# Patient Record
Sex: FEMALE | Race: WHITE | Employment: OTHER | URBAN - METROPOLITAN AREA
[De-identification: names, ages, dates, MRNs, and addresses within clinical notes are randomized per-mention and may not be internally consistent; named-entity substitution may affect disease eponyms.]

---

## 2017-04-05 ENCOUNTER — HOSPITAL ENCOUNTER (EMERGENCY)
Age: 62
Discharge: HOME | End: 2017-04-05
Attending: EMERGENCY MEDICINE
Payer: MEDICARE

## 2017-04-05 ENCOUNTER — APPOINTMENT (OUTPATIENT)
Dept: GENERAL RADIOLOGY | Age: 62
End: 2017-04-05
Payer: MEDICARE

## 2017-04-05 VITALS
HEIGHT: 67 IN | OXYGEN SATURATION: 98 % | TEMPERATURE: 97.7 F | WEIGHT: 210 LBS | HEART RATE: 70 BPM | BODY MASS INDEX: 32.96 KG/M2 | DIASTOLIC BLOOD PRESSURE: 49 MMHG | SYSTOLIC BLOOD PRESSURE: 117 MMHG | RESPIRATION RATE: 18 BRPM

## 2017-04-05 DIAGNOSIS — R07.9 CHEST PAIN, UNSPECIFIED: Primary | ICD-10-CM

## 2017-04-05 DIAGNOSIS — E87.6 HYPOKALEMIA: ICD-10-CM

## 2017-04-05 LAB
ABSOLUTE EOS #: 0 K/UL (ref 0–0.4)
ABSOLUTE LYMPH #: 2.1 K/UL (ref 1–4.8)
ABSOLUTE MONO #: 0.4 K/UL (ref 0.1–1.3)
ANION GAP SERPL CALCULATED.3IONS-SCNC: 16 MMOL/L (ref 9–17)
BASOPHILS # BLD: 1 % (ref 0–2)
BASOPHILS ABSOLUTE: 0.1 K/UL (ref 0–0.2)
BUN BLDV-MCNC: 9 MG/DL (ref 8–23)
BUN/CREAT BLD: ABNORMAL (ref 9–20)
CALCIUM SERPL-MCNC: 10 MG/DL (ref 8.6–10.4)
CHLORIDE BLD-SCNC: 99 MMOL/L (ref 98–107)
CO2: 24 MMOL/L (ref 20–31)
CREAT SERPL-MCNC: 0.62 MG/DL (ref 0.5–0.9)
DIFFERENTIAL TYPE: ABNORMAL
EOSINOPHILS RELATIVE PERCENT: 1 % (ref 0–4)
GFR AFRICAN AMERICAN: >60 ML/MIN
GFR NON-AFRICAN AMERICAN: >60 ML/MIN
GFR SERPL CREATININE-BSD FRML MDRD: ABNORMAL ML/MIN/{1.73_M2}
GFR SERPL CREATININE-BSD FRML MDRD: ABNORMAL ML/MIN/{1.73_M2}
GLUCOSE BLD-MCNC: 176 MG/DL (ref 70–99)
HCT VFR BLD CALC: 37.3 % (ref 36–46)
HEMOGLOBIN: 12.6 G/DL (ref 12–16)
LYMPHOCYTES # BLD: 29 % (ref 24–44)
MCH RBC QN AUTO: 31.6 PG (ref 26–34)
MCHC RBC AUTO-ENTMCNC: 33.8 G/DL (ref 31–37)
MCV RBC AUTO: 93.7 FL (ref 80–100)
MONOCYTES # BLD: 6 % (ref 1–7)
PDW BLD-RTO: 13.9 % (ref 11.5–14.9)
PLATELET # BLD: 248 K/UL (ref 150–450)
PLATELET ESTIMATE: ABNORMAL
PMV BLD AUTO: 8.9 FL (ref 6–12)
POTASSIUM SERPL-SCNC: 3.3 MMOL/L (ref 3.7–5.3)
RBC # BLD: 3.99 M/UL (ref 4–5.2)
RBC # BLD: ABNORMAL 10*6/UL
SEG NEUTROPHILS: 63 % (ref 36–66)
SEGMENTED NEUTROPHILS ABSOLUTE COUNT: 4.6 K/UL (ref 1.3–9.1)
SODIUM BLD-SCNC: 139 MMOL/L (ref 135–144)
TROPONIN INTERP: NORMAL
TROPONIN INTERP: NORMAL
TROPONIN T: <0.03 NG/ML
TROPONIN T: <0.03 NG/ML
WBC # BLD: 7.2 K/UL (ref 3.5–11)
WBC # BLD: ABNORMAL 10*3/UL

## 2017-04-05 PROCEDURE — 6360000002 HC RX W HCPCS: Performed by: EMERGENCY MEDICINE

## 2017-04-05 PROCEDURE — 36415 COLL VENOUS BLD VENIPUNCTURE: CPT

## 2017-04-05 PROCEDURE — 99285 EMERGENCY DEPT VISIT HI MDM: CPT

## 2017-04-05 PROCEDURE — 84484 ASSAY OF TROPONIN QUANT: CPT

## 2017-04-05 PROCEDURE — 85025 COMPLETE CBC W/AUTO DIFF WBC: CPT

## 2017-04-05 PROCEDURE — 93005 ELECTROCARDIOGRAM TRACING: CPT

## 2017-04-05 PROCEDURE — 80048 BASIC METABOLIC PNL TOTAL CA: CPT

## 2017-04-05 PROCEDURE — 94664 DEMO&/EVAL PT USE INHALER: CPT

## 2017-04-05 PROCEDURE — 94640 AIRWAY INHALATION TREATMENT: CPT

## 2017-04-05 PROCEDURE — 6370000000 HC RX 637 (ALT 250 FOR IP): Performed by: EMERGENCY MEDICINE

## 2017-04-05 PROCEDURE — 71020 XR CHEST STANDARD TWO VW: CPT

## 2017-04-05 RX ORDER — ASPIRIN 81 MG/1
324 TABLET, CHEWABLE ORAL ONCE
Status: COMPLETED | OUTPATIENT
Start: 2017-04-05 | End: 2017-04-05

## 2017-04-05 RX ORDER — METHOCARBAMOL 500 MG/1
1000 TABLET, FILM COATED ORAL ONCE
Status: COMPLETED | OUTPATIENT
Start: 2017-04-05 | End: 2017-04-05

## 2017-04-05 RX ORDER — ASPIRIN 81 MG/1
81 TABLET, CHEWABLE ORAL DAILY
Qty: 30 TABLET | Refills: 0 | Status: SHIPPED | OUTPATIENT
Start: 2017-04-05

## 2017-04-05 RX ORDER — POTASSIUM CHLORIDE 20 MEQ/1
20 TABLET, EXTENDED RELEASE ORAL ONCE
Status: COMPLETED | OUTPATIENT
Start: 2017-04-05 | End: 2017-04-05

## 2017-04-05 RX ORDER — METHOCARBAMOL 500 MG/1
500 TABLET, FILM COATED ORAL 4 TIMES DAILY
Qty: 10 TABLET | Refills: 0 | Status: SHIPPED | OUTPATIENT
Start: 2017-04-05 | End: 2017-04-15

## 2017-04-05 RX ADMIN — ALBUTEROL SULFATE 5 MG: 2.5 SOLUTION RESPIRATORY (INHALATION) at 16:33

## 2017-04-05 RX ADMIN — ASPIRIN 324 MG: 81 TABLET, CHEWABLE ORAL at 16:47

## 2017-04-05 RX ADMIN — METHOCARBAMOL 1000 MG: 500 TABLET ORAL at 16:47

## 2017-04-05 RX ADMIN — POTASSIUM CHLORIDE 20 MEQ: 20 TABLET, EXTENDED RELEASE ORAL at 18:47

## 2017-04-05 ASSESSMENT — PAIN DESCRIPTION - LOCATION
LOCATION: CHEST
LOCATION: CHEST

## 2017-04-05 ASSESSMENT — PAIN DESCRIPTION - DIRECTION: RADIATING_TOWARDS: RIGHT

## 2017-04-05 ASSESSMENT — ENCOUNTER SYMPTOMS
NAUSEA: 0
VOMITING: 0
SHORTNESS OF BREATH: 0
BACK PAIN: 0
COUGH: 1
ABDOMINAL PAIN: 0

## 2017-04-05 ASSESSMENT — PAIN DESCRIPTION - PAIN TYPE
TYPE: ACUTE PAIN
TYPE: ACUTE PAIN

## 2017-04-05 ASSESSMENT — PAIN DESCRIPTION - DESCRIPTORS
DESCRIPTORS: SHARP
DESCRIPTORS: ACHING

## 2017-04-05 ASSESSMENT — PAIN SCALES - GENERAL
PAINLEVEL_OUTOF10: 2
PAINLEVEL_OUTOF10: 8

## 2017-04-05 ASSESSMENT — PAIN DESCRIPTION - ORIENTATION: ORIENTATION: RIGHT

## 2017-04-06 LAB
EKG ATRIAL RATE: 80 BPM
EKG P AXIS: 22 DEGREES
EKG P-R INTERVAL: 146 MS
EKG Q-T INTERVAL: 406 MS
EKG QRS DURATION: 88 MS
EKG QTC CALCULATION (BAZETT): 468 MS
EKG R AXIS: 41 DEGREES
EKG T AXIS: 13 DEGREES
EKG VENTRICULAR RATE: 80 BPM

## 2018-02-26 PROBLEM — M96.1 POSTLAMINECTOMY SYNDROME, LUMBAR REGION: Status: ACTIVE | Noted: 2018-02-26

## 2018-07-24 PROBLEM — E66.9 OBESITY (BMI 30-39.9): Status: ACTIVE | Noted: 2018-07-24

## 2018-10-08 ENCOUNTER — HOSPITAL ENCOUNTER (OUTPATIENT)
Dept: WOMENS IMAGING | Age: 63
Discharge: HOME | End: 2018-10-10
Payer: MEDICARE

## 2018-10-08 DIAGNOSIS — Z12.31 VISIT FOR SCREENING MAMMOGRAM: ICD-10-CM

## 2018-10-08 PROCEDURE — 77063 BREAST TOMOSYNTHESIS BI: CPT

## 2018-10-31 ENCOUNTER — HOSPITAL ENCOUNTER (EMERGENCY)
Age: 63
Discharge: HOME | End: 2018-10-31
Attending: EMERGENCY MEDICINE
Payer: MEDICARE

## 2018-10-31 ENCOUNTER — APPOINTMENT (OUTPATIENT)
Dept: ULTRASOUND IMAGING | Age: 63
End: 2018-10-31
Payer: MEDICARE

## 2018-10-31 ENCOUNTER — APPOINTMENT (OUTPATIENT)
Dept: CT IMAGING | Age: 63
End: 2018-10-31
Payer: MEDICARE

## 2018-10-31 VITALS
OXYGEN SATURATION: 100 % | BODY MASS INDEX: 27.31 KG/M2 | DIASTOLIC BLOOD PRESSURE: 62 MMHG | RESPIRATION RATE: 22 BRPM | WEIGHT: 174 LBS | SYSTOLIC BLOOD PRESSURE: 116 MMHG | HEIGHT: 67 IN | HEART RATE: 62 BPM | TEMPERATURE: 98.2 F

## 2018-10-31 DIAGNOSIS — R10.13 ABDOMINAL PAIN, EPIGASTRIC: Primary | ICD-10-CM

## 2018-10-31 LAB
-: ABNORMAL
ALBUMIN SERPL-MCNC: 4.2 G/DL (ref 3.5–5.2)
ALBUMIN/GLOBULIN RATIO: ABNORMAL (ref 1–2.5)
ALP BLD-CCNC: 69 U/L (ref 35–104)
ALT SERPL-CCNC: 12 U/L (ref 5–33)
AMORPHOUS: ABNORMAL
ANION GAP SERPL CALCULATED.3IONS-SCNC: 11 MMOL/L (ref 9–17)
AST SERPL-CCNC: 20 U/L
BACTERIA: ABNORMAL
BILIRUB SERPL-MCNC: 0.26 MG/DL (ref 0.3–1.2)
BILIRUBIN URINE: NEGATIVE
BUN BLDV-MCNC: 14 MG/DL (ref 8–23)
BUN/CREAT BLD: 21 (ref 9–20)
CALCIUM SERPL-MCNC: 9.4 MG/DL (ref 8.6–10.4)
CASTS UA: ABNORMAL /LPF
CHLORIDE BLD-SCNC: 101 MMOL/L (ref 98–107)
CO2: 27 MMOL/L (ref 20–31)
COLOR: YELLOW
COMMENT UA: ABNORMAL
CREAT SERPL-MCNC: 0.67 MG/DL (ref 0.5–0.9)
CRYSTALS, UA: ABNORMAL /HPF
EKG ATRIAL RATE: 65 BPM
EKG P AXIS: 69 DEGREES
EKG P-R INTERVAL: 148 MS
EKG Q-T INTERVAL: 428 MS
EKG QRS DURATION: 88 MS
EKG QTC CALCULATION (BAZETT): 445 MS
EKG R AXIS: 88 DEGREES
EKG T AXIS: 71 DEGREES
EKG VENTRICULAR RATE: 65 BPM
EPITHELIAL CELLS UA: ABNORMAL /HPF (ref 0–5)
GFR AFRICAN AMERICAN: >60 ML/MIN
GFR NON-AFRICAN AMERICAN: >60 ML/MIN
GFR SERPL CREATININE-BSD FRML MDRD: ABNORMAL ML/MIN/{1.73_M2}
GFR SERPL CREATININE-BSD FRML MDRD: ABNORMAL ML/MIN/{1.73_M2}
GLUCOSE BLD-MCNC: 87 MG/DL (ref 70–99)
GLUCOSE URINE: NEGATIVE
HCT VFR BLD CALC: 35.4 % (ref 36–46)
HEMOGLOBIN: 11.9 G/DL (ref 12–16)
KETONES, URINE: NEGATIVE
LEUKOCYTE ESTERASE, URINE: ABNORMAL
LIPASE: 21 U/L (ref 13–60)
MCH RBC QN AUTO: 32.4 PG (ref 26–34)
MCHC RBC AUTO-ENTMCNC: 33.6 G/DL (ref 31–37)
MCV RBC AUTO: 96.3 FL (ref 80–100)
MUCUS: ABNORMAL
MYOGLOBIN: 29 NG/ML (ref 25–58)
NITRITE, URINE: NEGATIVE
NRBC AUTOMATED: ABNORMAL PER 100 WBC
OTHER OBSERVATIONS UA: ABNORMAL
PDW BLD-RTO: 12.6 % (ref 11.5–14.5)
PH UA: 7.5 (ref 5–8)
PLATELET # BLD: 217 K/UL (ref 130–400)
PMV BLD AUTO: ABNORMAL FL (ref 6–12)
POTASSIUM SERPL-SCNC: 3.7 MMOL/L (ref 3.7–5.3)
PROTEIN UA: NEGATIVE
RBC # BLD: 3.67 M/UL (ref 4–5.2)
RBC UA: ABNORMAL /HPF (ref 0–2)
RENAL EPITHELIAL, UA: ABNORMAL /HPF
SODIUM BLD-SCNC: 139 MMOL/L (ref 135–144)
SPECIFIC GRAVITY UA: 1.01 (ref 1–1.03)
TOTAL PROTEIN: 7 G/DL (ref 6.4–8.3)
TRICHOMONAS: ABNORMAL
TROPONIN INTERP: NORMAL
TROPONIN T: <0.03 NG/ML
TURBIDITY: CLEAR
URINE HGB: NEGATIVE
UROBILINOGEN, URINE: NORMAL
WBC # BLD: 7.5 K/UL (ref 3.5–11)
WBC UA: ABNORMAL /HPF (ref 0–5)
YEAST: ABNORMAL

## 2018-10-31 PROCEDURE — 84484 ASSAY OF TROPONIN QUANT: CPT

## 2018-10-31 PROCEDURE — 6370000000 HC RX 637 (ALT 250 FOR IP): Performed by: EMERGENCY MEDICINE

## 2018-10-31 PROCEDURE — 83874 ASSAY OF MYOGLOBIN: CPT

## 2018-10-31 PROCEDURE — 80053 COMPREHEN METABOLIC PANEL: CPT

## 2018-10-31 PROCEDURE — 83690 ASSAY OF LIPASE: CPT

## 2018-10-31 PROCEDURE — 87086 URINE CULTURE/COLONY COUNT: CPT

## 2018-10-31 PROCEDURE — 93005 ELECTROCARDIOGRAM TRACING: CPT

## 2018-10-31 PROCEDURE — 6360000004 HC RX CONTRAST MEDICATION: Performed by: EMERGENCY MEDICINE

## 2018-10-31 PROCEDURE — 85027 COMPLETE CBC AUTOMATED: CPT

## 2018-10-31 PROCEDURE — 99284 EMERGENCY DEPT VISIT MOD MDM: CPT

## 2018-10-31 PROCEDURE — 81001 URINALYSIS AUTO W/SCOPE: CPT

## 2018-10-31 PROCEDURE — 76705 ECHO EXAM OF ABDOMEN: CPT

## 2018-10-31 PROCEDURE — 6360000002 HC RX W HCPCS: Performed by: EMERGENCY MEDICINE

## 2018-10-31 PROCEDURE — 2580000003 HC RX 258: Performed by: EMERGENCY MEDICINE

## 2018-10-31 PROCEDURE — 96374 THER/PROPH/DIAG INJ IV PUSH: CPT

## 2018-10-31 PROCEDURE — 74177 CT ABD & PELVIS W/CONTRAST: CPT

## 2018-10-31 RX ORDER — OMEPRAZOLE 40 MG/1
40 CAPSULE, DELAYED RELEASE ORAL DAILY
Qty: 30 CAPSULE | Refills: 0 | Status: SHIPPED | OUTPATIENT
Start: 2018-10-31 | End: 2020-10-16

## 2018-10-31 RX ORDER — SODIUM CHLORIDE 0.9 % (FLUSH) 0.9 %
10 SYRINGE (ML) INJECTION PRN
Status: DISCONTINUED | OUTPATIENT
Start: 2018-10-31 | End: 2018-10-31 | Stop reason: HOSPADM

## 2018-10-31 RX ORDER — 0.9 % SODIUM CHLORIDE 0.9 %
500 INTRAVENOUS SOLUTION INTRAVENOUS ONCE
Status: COMPLETED | OUTPATIENT
Start: 2018-10-31 | End: 2018-10-31

## 2018-10-31 RX ORDER — KETOROLAC TROMETHAMINE 15 MG/ML
15 INJECTION, SOLUTION INTRAMUSCULAR; INTRAVENOUS ONCE
Status: COMPLETED | OUTPATIENT
Start: 2018-10-31 | End: 2018-10-31

## 2018-10-31 RX ORDER — 0.9 % SODIUM CHLORIDE 0.9 %
80 INTRAVENOUS SOLUTION INTRAVENOUS ONCE
Status: COMPLETED | OUTPATIENT
Start: 2018-10-31 | End: 2018-10-31

## 2018-10-31 RX ADMIN — SODIUM CHLORIDE 80 ML: 9 INJECTION, SOLUTION INTRAVENOUS at 15:57

## 2018-10-31 RX ADMIN — Medication 10 ML: at 15:57

## 2018-10-31 RX ADMIN — SODIUM CHLORIDE 500 ML: 9 INJECTION, SOLUTION INTRAVENOUS at 14:16

## 2018-10-31 RX ADMIN — LIDOCAINE HYDROCHLORIDE: 20 SOLUTION ORAL; TOPICAL at 16:52

## 2018-10-31 RX ADMIN — KETOROLAC TROMETHAMINE 15 MG: 15 INJECTION, SOLUTION INTRAMUSCULAR; INTRAVENOUS at 14:17

## 2018-10-31 RX ADMIN — IOPAMIDOL 75 ML: 755 INJECTION, SOLUTION INTRAVENOUS at 15:57

## 2018-10-31 ASSESSMENT — PAIN DESCRIPTION - ORIENTATION: ORIENTATION: RIGHT;UPPER

## 2018-10-31 ASSESSMENT — PAIN DESCRIPTION - LOCATION: LOCATION: ABDOMEN;BACK

## 2018-10-31 ASSESSMENT — ENCOUNTER SYMPTOMS
SHORTNESS OF BREATH: 0
ABDOMINAL PAIN: 1
BACK PAIN: 0

## 2018-10-31 ASSESSMENT — PAIN SCALES - GENERAL
PAINLEVEL_OUTOF10: 9
PAINLEVEL_OUTOF10: 9

## 2018-10-31 NOTE — ED NOTES
Pt states the pain is going up into her right sided breast area.       Vera Beard, RN  10/31/18 [id number]

## 2018-10-31 NOTE — ED PROVIDER NOTES
[address] [place] [address] ED  eMERGENCY dEPARTMENT eNCOUnter    Pt Name: Margi Madera  MRN: [id number]  [place] 1955  Date of evaluation: 10/31/18  CHIEF COMPLAINT       Chief Complaint   Patient presents with    Abdominal Pain     started yesterday     HISTORY OF PRESENT ILLNESS   HPI   61 y.o. F With history of multiple abdominal surgeries presents with right upper quadrant pain. Patient states she had onset of dull, constant, pain in her right upper quadrant that radiates to her right flank yesterday, that has persisted and worsened since then, initially starting not to severe, but now has gotten severe. She denies any associated symptoms such as nausea, vomiting, diarrhea, fever. Despite her several abdominal surgeries in the past, she is never had her gallbladder taken out as far as she knows. She is A decent appetite despite her symptoms. REVIEW OF SYSTEMS     Review of Systems   Constitutional: Negative for fever. Respiratory: Negative for shortness of breath. Cardiovascular: Negative for chest pain. Gastrointestinal: Positive for abdominal pain. Genitourinary: Negative for dysuria and frequency. Musculoskeletal: Negative for back pain. Skin: Negative for rash. Allergic/Immunologic: Negative for immunocompromised state. Neurological: Negative for light-headedness. Psychiatric/Behavioral: Negative for confusion.      PASTMEDICAL HISTORY     Past Medical History:   Diagnosis Date    Arthritis     Depression     Displacement of intervertebral disc, site unspecified, without myelopathy     BWC    Displacement of lumbar intervertebral disc without myelopathy     bwc    Hyperlipidemia     Lumbago     Bwc    Sprain of lumbar region     bwc    Sprain of lumbosacral (joint) (ligament)     BWC    Sprain of sacrum     BWC     SURGICAL HISTORY       Past Surgical History:   Procedure Laterality Date    ABDOMINAL EXPLORATION SURGERY      BACK SURGERY      X2    COLON SURGERY     

## 2018-11-01 LAB
CULTURE: NORMAL
Lab: NORMAL
SPECIMEN DESCRIPTION: NORMAL
STATUS: NORMAL

## 2019-01-16 ENCOUNTER — OFFICE VISIT (OUTPATIENT)
Dept: FAMILY MEDICINE CLINIC | Age: 64
End: 2019-01-16
Payer: COMMERCIAL

## 2019-01-16 VITALS
OXYGEN SATURATION: 97 % | HEIGHT: 67 IN | BODY MASS INDEX: 28.25 KG/M2 | SYSTOLIC BLOOD PRESSURE: 96 MMHG | RESPIRATION RATE: 16 BRPM | HEART RATE: 67 BPM | WEIGHT: 180 LBS | TEMPERATURE: 97.6 F | DIASTOLIC BLOOD PRESSURE: 52 MMHG

## 2019-01-16 DIAGNOSIS — F51.01 PRIMARY INSOMNIA: ICD-10-CM

## 2019-01-16 DIAGNOSIS — S33.9XXS SPRAIN OF LIGAMENT OF LUMBOSACRAL JOINT, SEQUELA: ICD-10-CM

## 2019-01-16 DIAGNOSIS — S33.8XXS SACRUM SPRAIN, SEQUELA: ICD-10-CM

## 2019-01-16 DIAGNOSIS — S39.012S STRAIN OF LUMBAR REGION, SEQUELA: ICD-10-CM

## 2019-01-16 DIAGNOSIS — M51.26 DISPLACEMENT OF LUMBAR INTERVERTEBRAL DISC WITHOUT MYELOPATHY: ICD-10-CM

## 2019-01-16 DIAGNOSIS — E78.5 DYSLIPIDEMIA: ICD-10-CM

## 2019-01-16 DIAGNOSIS — M51.26 DISPLACEMENT OF INTERVERTEBRAL DISC OF LUMBAR REGION: Primary | ICD-10-CM

## 2019-01-16 PROCEDURE — 99214 OFFICE O/P EST MOD 30 MIN: CPT | Performed by: FAMILY MEDICINE

## 2019-01-16 RX ORDER — PREGABALIN 150 MG/1
150 CAPSULE ORAL 2 TIMES DAILY
Qty: 60 CAPSULE | Refills: 0 | Status: SHIPPED | OUTPATIENT
Start: 2019-01-16 | End: 2019-02-14 | Stop reason: SDUPTHER

## 2019-01-16 RX ORDER — OXYCODONE HYDROCHLORIDE AND ACETAMINOPHEN 5; 325 MG/1; MG/1
1 TABLET ORAL 2 TIMES DAILY
Qty: 60 TABLET | Refills: 0 | Status: SHIPPED | OUTPATIENT
Start: 2019-01-16 | End: 2019-02-14 | Stop reason: SDUPTHER

## 2019-01-16 RX ORDER — FLUOXETINE HYDROCHLORIDE 20 MG/1
CAPSULE ORAL
Qty: 180 CAPSULE | Refills: 0 | Status: SHIPPED | OUTPATIENT
Start: 2019-01-16 | End: 2019-02-14 | Stop reason: SDUPTHER

## 2019-01-16 RX ORDER — ZOLPIDEM TARTRATE 5 MG/1
5 TABLET ORAL NIGHTLY PRN
Qty: 30 TABLET | Refills: 2 | Status: SHIPPED | OUTPATIENT
Start: 2019-01-16 | End: 2019-01-30

## 2019-01-16 RX ORDER — SIMVASTATIN 40 MG
TABLET ORAL
Qty: 90 TABLET | Refills: 1 | Status: SHIPPED | OUTPATIENT
Start: 2019-01-16 | End: 2019-07-18 | Stop reason: SDUPTHER

## 2019-01-16 RX ORDER — TRAZODONE HYDROCHLORIDE 100 MG/1
TABLET ORAL
Qty: 90 TABLET | Refills: 0 | Status: SHIPPED | OUTPATIENT
Start: 2019-01-16 | End: 2019-02-14 | Stop reason: SDUPTHER

## 2019-01-16 ASSESSMENT — ENCOUNTER SYMPTOMS
WHEEZING: 0
COLOR CHANGE: 0
EYE PAIN: 0
ABDOMINAL DISTENTION: 0
TROUBLE SWALLOWING: 0
VOMITING: 0
SINUS PRESSURE: 0
SHORTNESS OF BREATH: 0
ANAL BLEEDING: 0
ABDOMINAL PAIN: 0
SORE THROAT: 0
NAUSEA: 0
FACIAL SWELLING: 0
EYE DISCHARGE: 0
CONSTIPATION: 0
DIARRHEA: 0
APNEA: 0
CHEST TIGHTNESS: 0
PHOTOPHOBIA: 0
BACK PAIN: 1

## 2019-02-14 ENCOUNTER — OFFICE VISIT (OUTPATIENT)
Dept: FAMILY MEDICINE CLINIC | Age: 64
End: 2019-02-14
Payer: COMMERCIAL

## 2019-02-14 VITALS
SYSTOLIC BLOOD PRESSURE: 95 MMHG | BODY MASS INDEX: 28.88 KG/M2 | HEIGHT: 67 IN | HEART RATE: 64 BPM | OXYGEN SATURATION: 94 % | DIASTOLIC BLOOD PRESSURE: 51 MMHG | WEIGHT: 184 LBS | RESPIRATION RATE: 14 BRPM

## 2019-02-14 DIAGNOSIS — S33.8XXS SACRUM SPRAIN, SEQUELA: ICD-10-CM

## 2019-02-14 DIAGNOSIS — M51.26 DISPLACEMENT OF LUMBAR INTERVERTEBRAL DISC WITHOUT MYELOPATHY: Primary | ICD-10-CM

## 2019-02-14 DIAGNOSIS — S39.012S STRAIN OF LUMBAR REGION, SEQUELA: ICD-10-CM

## 2019-02-14 DIAGNOSIS — M51.26 DISPLACEMENT OF INTERVERTEBRAL DISC OF LUMBAR REGION: ICD-10-CM

## 2019-02-14 DIAGNOSIS — S33.9XXS SPRAIN OF LIGAMENT OF LUMBOSACRAL JOINT, SEQUELA: ICD-10-CM

## 2019-02-14 PROBLEM — H81.02 MENIERE'S DISEASE OF LEFT EAR: Status: ACTIVE | Noted: 2019-02-14

## 2019-02-14 PROCEDURE — 99214 OFFICE O/P EST MOD 30 MIN: CPT | Performed by: FAMILY MEDICINE

## 2019-02-14 RX ORDER — OXYCODONE HYDROCHLORIDE AND ACETAMINOPHEN 5; 325 MG/1; MG/1
1 TABLET ORAL 2 TIMES DAILY
Qty: 60 TABLET | Refills: 0 | Status: SHIPPED | OUTPATIENT
Start: 2019-02-14 | End: 2019-03-14 | Stop reason: SDUPTHER

## 2019-02-14 RX ORDER — FLUOXETINE HYDROCHLORIDE 20 MG/1
CAPSULE ORAL
Qty: 60 CAPSULE | Refills: 2 | Status: SHIPPED | OUTPATIENT
Start: 2019-02-14 | End: 2019-06-10 | Stop reason: SDUPTHER

## 2019-02-14 RX ORDER — TRAZODONE HYDROCHLORIDE 100 MG/1
TABLET ORAL
Qty: 30 TABLET | Refills: 0 | Status: SHIPPED | OUTPATIENT
Start: 2019-02-14 | End: 2019-06-10 | Stop reason: SDUPTHER

## 2019-02-14 RX ORDER — PREGABALIN 150 MG/1
150 CAPSULE ORAL 2 TIMES DAILY
Qty: 60 CAPSULE | Refills: 0 | Status: SHIPPED | OUTPATIENT
Start: 2019-02-14 | End: 2019-03-14 | Stop reason: SDUPTHER

## 2019-02-14 ASSESSMENT — PATIENT HEALTH QUESTIONNAIRE - PHQ9
2. FEELING DOWN, DEPRESSED OR HOPELESS: 0
SUM OF ALL RESPONSES TO PHQ QUESTIONS 1-9: 0
1. LITTLE INTEREST OR PLEASURE IN DOING THINGS: 0
SUM OF ALL RESPONSES TO PHQ9 QUESTIONS 1 & 2: 0
SUM OF ALL RESPONSES TO PHQ QUESTIONS 1-9: 0

## 2019-02-14 ASSESSMENT — ENCOUNTER SYMPTOMS
EYE DISCHARGE: 0
TROUBLE SWALLOWING: 0
EYE PAIN: 0
SHORTNESS OF BREATH: 0
ABDOMINAL DISTENTION: 0
SORE THROAT: 0
ANAL BLEEDING: 0
CHEST TIGHTNESS: 0
BACK PAIN: 1
APNEA: 0
VOMITING: 0
ABDOMINAL PAIN: 0
CONSTIPATION: 0
NAUSEA: 0
PHOTOPHOBIA: 0
SINUS PRESSURE: 0
WHEEZING: 0
COLOR CHANGE: 0
DIARRHEA: 0
FACIAL SWELLING: 0

## 2019-03-14 ENCOUNTER — OFFICE VISIT (OUTPATIENT)
Dept: FAMILY MEDICINE CLINIC | Age: 64
End: 2019-03-14
Payer: COMMERCIAL

## 2019-03-14 VITALS
RESPIRATION RATE: 14 BRPM | DIASTOLIC BLOOD PRESSURE: 54 MMHG | SYSTOLIC BLOOD PRESSURE: 86 MMHG | OXYGEN SATURATION: 95 % | HEIGHT: 67 IN | WEIGHT: 185 LBS | BODY MASS INDEX: 29.03 KG/M2 | HEART RATE: 67 BPM

## 2019-03-14 DIAGNOSIS — S33.9XXS SPRAIN OF LIGAMENT OF LUMBOSACRAL JOINT, SEQUELA: ICD-10-CM

## 2019-03-14 DIAGNOSIS — M51.26 DISPLACEMENT OF INTERVERTEBRAL DISC OF LUMBAR REGION: Primary | ICD-10-CM

## 2019-03-14 DIAGNOSIS — S33.8XXS SACRUM SPRAIN, SEQUELA: ICD-10-CM

## 2019-03-14 DIAGNOSIS — M51.26 DISPLACEMENT OF LUMBAR INTERVERTEBRAL DISC WITHOUT MYELOPATHY: ICD-10-CM

## 2019-03-14 DIAGNOSIS — S39.012S STRAIN OF LUMBAR REGION, SEQUELA: ICD-10-CM

## 2019-03-14 PROCEDURE — 99214 OFFICE O/P EST MOD 30 MIN: CPT | Performed by: FAMILY MEDICINE

## 2019-03-14 RX ORDER — PREGABALIN 150 MG/1
150 CAPSULE ORAL 2 TIMES DAILY
Qty: 60 CAPSULE | Refills: 0 | Status: SHIPPED | OUTPATIENT
Start: 2019-03-14 | End: 2019-04-12 | Stop reason: SDUPTHER

## 2019-03-14 RX ORDER — OXYCODONE HYDROCHLORIDE AND ACETAMINOPHEN 5; 325 MG/1; MG/1
1 TABLET ORAL 2 TIMES DAILY
Qty: 60 TABLET | Refills: 0 | Status: SHIPPED | OUTPATIENT
Start: 2019-03-14 | End: 2019-04-12 | Stop reason: SDUPTHER

## 2019-03-14 ASSESSMENT — ENCOUNTER SYMPTOMS
COLOR CHANGE: 0
SORE THROAT: 0
EYE PAIN: 0
EYE DISCHARGE: 0
SHORTNESS OF BREATH: 0
FACIAL SWELLING: 0
WHEEZING: 0
CHEST TIGHTNESS: 0
VOMITING: 0
BACK PAIN: 1
ANAL BLEEDING: 0
TROUBLE SWALLOWING: 0
PHOTOPHOBIA: 0
APNEA: 0
DIARRHEA: 0
CONSTIPATION: 0
NAUSEA: 0
SINUS PRESSURE: 0
ABDOMINAL PAIN: 0
ABDOMINAL DISTENTION: 0

## 2019-04-12 ENCOUNTER — OFFICE VISIT (OUTPATIENT)
Dept: FAMILY MEDICINE CLINIC | Age: 64
End: 2019-04-12
Payer: COMMERCIAL

## 2019-04-12 VITALS
OXYGEN SATURATION: 99 % | WEIGHT: 181.8 LBS | DIASTOLIC BLOOD PRESSURE: 66 MMHG | BODY MASS INDEX: 28.47 KG/M2 | SYSTOLIC BLOOD PRESSURE: 110 MMHG | RESPIRATION RATE: 16 BRPM | HEART RATE: 74 BPM

## 2019-04-12 DIAGNOSIS — S39.012S STRAIN OF LUMBAR REGION, SEQUELA: ICD-10-CM

## 2019-04-12 DIAGNOSIS — S33.9XXS SPRAIN OF LIGAMENT OF LUMBOSACRAL JOINT, SEQUELA: ICD-10-CM

## 2019-04-12 DIAGNOSIS — M51.26 DISPLACEMENT OF LUMBAR INTERVERTEBRAL DISC WITHOUT MYELOPATHY: Primary | ICD-10-CM

## 2019-04-12 DIAGNOSIS — S33.8XXS SACRUM SPRAIN, SEQUELA: ICD-10-CM

## 2019-04-12 DIAGNOSIS — M51.26 DISPLACEMENT OF INTERVERTEBRAL DISC OF LUMBAR REGION: ICD-10-CM

## 2019-04-12 PROCEDURE — 99214 OFFICE O/P EST MOD 30 MIN: CPT | Performed by: FAMILY MEDICINE

## 2019-04-12 RX ORDER — PREGABALIN 150 MG/1
150 CAPSULE ORAL 2 TIMES DAILY
Qty: 60 CAPSULE | Refills: 0 | Status: SHIPPED | OUTPATIENT
Start: 2019-04-12 | End: 2019-05-13 | Stop reason: SDUPTHER

## 2019-04-12 RX ORDER — OXYCODONE HYDROCHLORIDE AND ACETAMINOPHEN 5; 325 MG/1; MG/1
1 TABLET ORAL 2 TIMES DAILY
Qty: 60 TABLET | Refills: 0 | Status: SHIPPED | OUTPATIENT
Start: 2019-04-12 | End: 2019-05-13 | Stop reason: SDUPTHER

## 2019-04-12 ASSESSMENT — ENCOUNTER SYMPTOMS
NAUSEA: 0
APNEA: 0
PHOTOPHOBIA: 0
ANAL BLEEDING: 0
ABDOMINAL DISTENTION: 0
SINUS PRESSURE: 0
WHEEZING: 0
FACIAL SWELLING: 0
CONSTIPATION: 0
DIARRHEA: 0
CHEST TIGHTNESS: 0
COLOR CHANGE: 0
EYE DISCHARGE: 0
SORE THROAT: 0
BACK PAIN: 1
TROUBLE SWALLOWING: 0
ABDOMINAL PAIN: 0
EYE PAIN: 0
SHORTNESS OF BREATH: 0
VOMITING: 0

## 2019-04-12 NOTE — PROGRESS NOTES
Lulu Garza MD, PhD Jr [address] [facility] [address] [facility]Gavin [facility]ipps is a 59 y.o. female who presents today for her medical conditions/complaints as noted below. Hari Elizabeth is c/o of   Chief Complaint   Patient presents with    Back Pain         HPI:     Back Pain   This is a chronic problem. The current episode started more than 1 year ago. The problem occurs daily. The problem has been waxing and waning since onset. The pain is present in the lumbar spine, sacro-iliac and gluteal. The quality of the pain is described as aching, burning, cramping and shooting. The pain radiates to the right foot, right thigh and right knee. The pain is at a severity of 4/10. The pain is moderate. The pain is worse during the day. The symptoms are aggravated by bending, twisting, standing, position and coughing. Stiffness is present in the morning. Associated symptoms include numbness, paresthesias, tingling and weakness. Pertinent negatives include no abdominal pain, chest pain or fever. She has tried analgesics, home exercises, heat, chiropractic manipulation, ice, muscle relaxant, NSAIDs and walking for the symptoms. The treatment provided mild relief.        No results found for: LABA1C          ( goal A1C is < 7)   No results found for: LABMICR  LDL Cholesterol (mg/dL)   Date Value   10/27/2014 91   10/16/2012 128 (H)   11/07/2011 165 (H)       (goal LDL is <100)   AST (U/L)   Date Value   10/31/2018 20     ALT (U/L)   Date Value   10/31/2018 12     BUN (mg/dL)   Date Value   10/31/2018 14     BP Readings from Last 3 Encounters:   04/12/19 110/66   03/14/19 (!) 86/54   02/14/19 (!) 95/51          (goal 120/80)    Past Medical History:   Diagnosis Date    Arthritis     Depression     Displacement of intervertebral disc, site unspecified, without myelopathy     BWC    Displacement of lumbar intervertebral disc without myelopathy     bwc    Hyperlipidemia     Lumbago     Bwc    Sprain of lumbar region     bwc    Sprain of lumbosacral (joint) (ligament)     BWC    Sprain of sacrum     BWC      Past Surgical History:   Procedure Laterality Date    ABDOMINAL EXPLORATION SURGERY      BACK SURGERY      X2    COLON SURGERY      COLONOSCOPY      DILATION AND CURETTAGE OF UTERUS      FOOT SURGERY Right     HAND SURGERY Right 6/18/14    OVARY REMOVAL Bilateral     WRIST ARTHROPLASTY Left        Family History   Problem Relation Age of Onset    Heart Disease Father     High Blood Pressure Father        Social History     Tobacco Use    Smoking status: Former Smoker    Smokeless tobacco: Never Used    Tobacco comment: quit 32 years ago   Substance Use Topics    Alcohol use: Yes     Alcohol/week: 1.2 oz     Types: 2 Cans of beer per week     Comment: OCCASSION      Current Outpatient Medications   Medication Sig Dispense Refill    oxyCODONE-acetaminophen (PERCOCET) 5-325 MG per tablet Take 1 tablet by mouth 2 times daily for 30 days. 60 tablet 0    pregabalin (LYRICA) 150 MG capsule Take 1 capsule by mouth 2 times daily for 60 doses. 60 capsule 0    FLUoxetine (PROZAC) 20 MG capsule TAKE 2 CAPSULES BY MOUTH IN THE MORNING 60 capsule 2    traZODone (DESYREL) 100 MG tablet TAKE 1 TABLET BY MOUTH AT BEDTIME 30 tablet 0    simvastatin (ZOCOR) 40 MG tablet TAKE 1 TABLET BY MOUTH  EVERY NIGHT AT BEDTIME 90 tablet 1    omeprazole (PRILOSEC) 40 MG delayed release capsule Take 1 capsule by mouth daily for 30 doses 30 capsule 0    aspirin (ASPIRIN CHILDRENS) 81 MG chewable tablet Take 1 tablet by mouth daily 30 tablet 0    Biotin 800 MCG TABS Take 800 mcg by mouth daily.  calcium-vitamin D (OSCAL-500) 500-200 MG-UNIT per tablet Take 1 tablet by mouth 2 times daily. No current facility-administered medications for this visit.       Allergies   Allergen Reactions    Ambien [Zolpidem Tartrate] Shortness Of Breath    Ativan [Lorazepam] Shortness Of Breath    Darvon [Propoxyphene] Other (See Comments)     Mental changes    Restoril [Temazepam]     Topamax [Topiramate]        Health Maintenance   Topic Date Due    Hepatitis C screen  1955    Pneumococcal 0-64 years Vaccine (1 of 1 - PPSV23) 02/22/1961    HIV screen  02/22/1970    DTaP/Tdap/Td vaccine (1 - Tdap) 02/22/1974    Cervical cancer screen  02/22/1976    Shingles Vaccine (1 of 2) 02/22/2005    Colon cancer screen colonoscopy  02/22/2005    Lipid screen  10/27/2019    Breast cancer screen  10/08/2020    Flu vaccine  Completed       Subjective:      Review of Systems   Constitutional: Negative for fatigue, fever and unexpected weight change. HENT: Negative for congestion, ear discharge, facial swelling, sinus pressure, sore throat and trouble swallowing. Eyes: Negative for photophobia, pain and discharge. Respiratory: Negative for apnea, chest tightness, shortness of breath and wheezing. Cardiovascular: Negative for chest pain and palpitations. Gastrointestinal: Negative for abdominal distention, abdominal pain, anal bleeding, constipation, diarrhea, nausea and vomiting. Endocrine: Negative for cold intolerance, heat intolerance, polydipsia, polyphagia and polyuria. Genitourinary: Negative for difficulty urinating, flank pain, frequency and hematuria. Musculoskeletal: Positive for arthralgias, back pain and gait problem. Negative for neck pain. Skin: Negative for color change and rash. Neurological: Positive for tingling, weakness, numbness and paresthesias. Negative for dizziness, syncope, facial asymmetry, speech difficulty and light-headedness. Hematological: Negative for adenopathy. Psychiatric/Behavioral: Positive for dysphoric mood and sleep disturbance. Negative for agitation, behavioral problems, confusion, hallucinations and suicidal ideas. The patient is nervous/anxious. The patient is not hyperactive.         Objective:     Physical Exam Constitutional: She is oriented to person, place, and time. She appears well-developed. No distress. HENT:   Head: Normocephalic. Neck: Normal range of motion. Neck supple. No thyromegaly present. Cardiovascular: Normal rate, regular rhythm and normal heart sounds. No murmur heard. Pulmonary/Chest: Breath sounds normal. She has no wheezes. She has no rales. She exhibits no tenderness. Abdominal: Soft. Bowel sounds are normal. She exhibits no distension and no mass. There is no tenderness. There is no rebound and no guarding. Musculoskeletal: She exhibits tenderness. She exhibits no edema. Right hip: She exhibits decreased range of motion, decreased strength and tenderness. Left hip: She exhibits decreased range of motion, decreased strength and tenderness. Lumbar back: She exhibits decreased range of motion and tenderness. Back:         Legs:  Lymphadenopathy:     She has no cervical adenopathy. Neurological: She is alert and oriented to person, place, and time. Skin: Skin is warm. No rash noted. Psychiatric: Her behavior is normal. Judgment and thought content normal. Her mood appears anxious. Her affect is blunt and labile. Her speech is delayed. Cognition and memory are normal. She exhibits a depressed mood. Nursing note and vitals reviewed. /66   Pulse 74   Resp 16   Wt 181 lb 12.8 oz (82.5 kg)   SpO2 99%   BMI 28.47 kg/m²     Assessment:       Diagnosis Orders   1. Displacement of lumbar intervertebral disc without myelopathy  oxyCODONE-acetaminophen (PERCOCET) 5-325 MG per tablet    pregabalin (LYRICA) 150 MG capsule   2. Strain of lumbar region, sequela  oxyCODONE-acetaminophen (PERCOCET) 5-325 MG per tablet    pregabalin (LYRICA) 150 MG capsule   3. Sacrum sprain, sequela  oxyCODONE-acetaminophen (PERCOCET) 5-325 MG per tablet    pregabalin (LYRICA) 150 MG capsule   4.  Displacement of intervertebral disc of lumbar region oxyCODONE-acetaminophen (PERCOCET) 5-325 MG per tablet    pregabalin (LYRICA) 150 MG capsule   5. Sprain of ligament of lumbosacral joint, sequela  oxyCODONE-acetaminophen (PERCOCET) 5-325 MG per tablet    pregabalin (LYRICA) 150 MG capsule                 Plan:     The current medical regimen is effective;  continue present plan and medications. Rx Percocet?pregabalin bid prn  Isometric lumbar exercise daily and both legs strengthening  Fall precautions  If necessary, use a cane as mobility device  Controlled Substances Monitoring:     RX Monitoring 4/12/2019   Attestation The Prescription Monitoring Report for this patient was reviewed today. Chronic Pain Routine Monitoring No signs of potential drug abuse or diversion identified: otherwise, see note documentation         Return in about 2 months (around 6/12/2019) for follow up. No orders of the defined types were placed in this encounter. Patient given educational materials - see patient instructions. Discussed use, benefit,and side effects of prescribed medications. All patient questions answered. Pt voiced understanding. Reviewed health maintenance. Instructed to continue current medications, diet and exercise. Patient agreed withtreatment plan. Follow up as directed.      Electronically signed by Lucy Howell MD on 4/12/2019 at 12:54 PM

## 2019-05-13 ENCOUNTER — OFFICE VISIT (OUTPATIENT)
Dept: FAMILY MEDICINE CLINIC | Age: 64
End: 2019-05-13
Payer: COMMERCIAL

## 2019-05-13 VITALS
BODY MASS INDEX: 28.41 KG/M2 | DIASTOLIC BLOOD PRESSURE: 61 MMHG | HEIGHT: 67 IN | WEIGHT: 181 LBS | HEART RATE: 63 BPM | SYSTOLIC BLOOD PRESSURE: 102 MMHG

## 2019-05-13 DIAGNOSIS — S33.8XXS SACRUM SPRAIN, SEQUELA: ICD-10-CM

## 2019-05-13 DIAGNOSIS — M51.26 DISPLACEMENT OF INTERVERTEBRAL DISC OF LUMBAR REGION: Primary | ICD-10-CM

## 2019-05-13 DIAGNOSIS — M51.26 DISPLACEMENT OF LUMBAR INTERVERTEBRAL DISC WITHOUT MYELOPATHY: ICD-10-CM

## 2019-05-13 DIAGNOSIS — S33.9XXS SPRAIN OF LIGAMENT OF LUMBOSACRAL JOINT, SEQUELA: ICD-10-CM

## 2019-05-13 DIAGNOSIS — M96.1 POSTLAMINECTOMY SYNDROME, LUMBAR REGION: ICD-10-CM

## 2019-05-13 DIAGNOSIS — S39.012S STRAIN OF LUMBAR REGION, SEQUELA: ICD-10-CM

## 2019-05-13 PROCEDURE — 99214 OFFICE O/P EST MOD 30 MIN: CPT | Performed by: FAMILY MEDICINE

## 2019-05-13 RX ORDER — OXYCODONE HYDROCHLORIDE AND ACETAMINOPHEN 5; 325 MG/1; MG/1
1 TABLET ORAL 2 TIMES DAILY
Qty: 60 TABLET | Refills: 0 | Status: SHIPPED | OUTPATIENT
Start: 2019-05-13 | End: 2019-06-10 | Stop reason: SDUPTHER

## 2019-05-13 RX ORDER — PREGABALIN 150 MG/1
150 CAPSULE ORAL 2 TIMES DAILY
Qty: 60 CAPSULE | Refills: 0 | Status: SHIPPED | OUTPATIENT
Start: 2019-05-13 | End: 2019-06-10 | Stop reason: SDUPTHER

## 2019-05-13 ASSESSMENT — ENCOUNTER SYMPTOMS
DIARRHEA: 0
SHORTNESS OF BREATH: 0
NAUSEA: 0
ANAL BLEEDING: 0
BACK PAIN: 1
EYE PAIN: 0
VOMITING: 0
WHEEZING: 0
FACIAL SWELLING: 0
SINUS PRESSURE: 0
PHOTOPHOBIA: 0
EYE DISCHARGE: 0
ABDOMINAL DISTENTION: 0
CHEST TIGHTNESS: 0
CONSTIPATION: 0
COLOR CHANGE: 0
TROUBLE SWALLOWING: 0
SORE THROAT: 0
APNEA: 0
ABDOMINAL PAIN: 0

## 2019-05-13 NOTE — PROGRESS NOTES
Elaine Thomas MD, PhD Rebekah Kim [address] [place] [address] [address] [place], [place] [address]Gavin Phipps is a 59 y.o. female who presents today for her medical conditions/complaints as noted below. Robin Marquez is c/o of   Chief Complaint   Patient presents with    Back Pain     BWC ck up         HPI:     Back Pain   This is a chronic problem. The current episode started more than 1 year ago. The problem has been waxing and waning since onset. The pain is present in the lumbar spine, sacro-iliac and gluteal. The quality of the pain is described as aching, burning, cramping and shooting. The pain radiates to the right foot, right knee and right thigh. The pain is at a severity of 5/10. The pain is moderate. The pain is worse during the night. The symptoms are aggravated by stress, twisting, standing, position, coughing and bending. Stiffness is present in the morning. Associated symptoms include numbness, paresthesias, tingling and weakness. Pertinent negatives include no abdominal pain, chest pain or fever. She has tried chiropractic manipulation, heat, analgesics, bed rest, home exercises, ice, muscle relaxant, NSAIDs and walking for the symptoms. The treatment provided mild relief.        No results found for: LABA1C          ( goal A1C is < 7)   No results found for: LABMICR  LDL Cholesterol (mg/dL)   Date Value   10/27/2014 91   10/16/2012 128 (H)   11/07/2011 165 (H)       (goal LDL is <100)   AST (U/L)   Date Value   10/31/2018 20     ALT (U/L)   Date Value   10/31/2018 12     BUN (mg/dL)   Date Value   10/31/2018 14     BP Readings from Last 3 Encounters:   05/13/19 102/61   04/12/19 110/66   03/14/19 (!) 86/54          (goal 120/80)    Past Medical History:   Diagnosis Date    Arthritis     Depression     Displacement of intervertebral disc, site unspecified, without myelopathy     BWC    Displacement of lumbar intervertebral disc without myelopathy     bwc  Hyperlipidemia     Lumbago     Bwc    Sprain of lumbar region     bwc    Sprain of lumbosacral (joint) (ligament)     BWC    Sprain of sacrum     BWC      Past Surgical History:   Procedure Laterality Date    ABDOMINAL EXPLORATION SURGERY      BACK SURGERY      X2    COLON SURGERY      COLONOSCOPY      DILATION AND CURETTAGE OF UTERUS      FOOT SURGERY Right     HAND SURGERY Right 6/18/14    OVARY REMOVAL Bilateral     WRIST ARTHROPLASTY Left        Family History   Problem Relation Age of Onset    Heart Disease Father     High Blood Pressure Father        Social History     Tobacco Use    Smoking status: Former Smoker    Smokeless tobacco: Never Used    Tobacco comment: quit 32 years ago   Substance Use Topics    Alcohol use: Yes     Alcohol/week: 1.2 oz     Types: 2 Cans of beer per week     Comment: OCCASSION      Current Outpatient Medications   Medication Sig Dispense Refill    pregabalin (LYRICA) 150 MG capsule Take 1 capsule by mouth 2 times daily for 60 doses. 60 capsule 0    oxyCODONE-acetaminophen (PERCOCET) 5-325 MG per tablet Take 1 tablet by mouth 2 times daily for 30 days. 60 tablet 0    FLUoxetine (PROZAC) 20 MG capsule TAKE 2 CAPSULES BY MOUTH IN THE MORNING 60 capsule 2    traZODone (DESYREL) 100 MG tablet TAKE 1 TABLET BY MOUTH AT BEDTIME 30 tablet 0    simvastatin (ZOCOR) 40 MG tablet TAKE 1 TABLET BY MOUTH  EVERY NIGHT AT BEDTIME 90 tablet 1    Biotin 800 MCG TABS Take 800 mcg by mouth daily.  calcium-vitamin D (OSCAL-500) 500-200 MG-UNIT per tablet Take 1 tablet by mouth 2 times daily.  omeprazole (PRILOSEC) 40 MG delayed release capsule Take 1 capsule by mouth daily for 30 doses 30 capsule 0    aspirin (ASPIRIN CHILDRENS) 81 MG chewable tablet Take 1 tablet by mouth daily 30 tablet 0     No current facility-administered medications for this visit.       Allergies   Allergen Reactions    Ambien [Zolpidem Tartrate] Shortness Of Breath    Ativan [Lorazepam] She is oriented to person, place, and time. She appears well-developed. No distress. HENT:   Head: Normocephalic. Neck: Normal range of motion. Neck supple. No thyromegaly present. Cardiovascular: Normal rate, regular rhythm and normal heart sounds. No murmur heard. Pulmonary/Chest: Breath sounds normal. She has no wheezes. She has no rales. She exhibits no tenderness. Abdominal: Soft. Bowel sounds are normal. She exhibits no distension and no mass. There is no tenderness. There is no rebound and no guarding. Musculoskeletal: She exhibits tenderness. She exhibits no edema. Lumbar back: She exhibits decreased range of motion and tenderness. Back:    Lymphadenopathy:     She has no cervical adenopathy. Neurological: She is alert and oriented to person, place, and time. Skin: Skin is warm. No rash noted. Psychiatric: Thought content normal. Her mood appears anxious. Her affect is blunt and labile. Her speech is delayed. She is slowed. She exhibits abnormal recent memory. Nursing note and vitals reviewed. /61   Pulse 63   Ht 5' 7.01\" (1.702 m)   Wt 181 lb (82.1 kg)   BMI 28.34 kg/m²     Assessment:       Diagnosis Orders   1. Displacement of intervertebral disc of lumbar region  pregabalin (LYRICA) 150 MG capsule    oxyCODONE-acetaminophen (PERCOCET) 5-325 MG per tablet   2. Strain of lumbar region, sequela  pregabalin (LYRICA) 150 MG capsule    oxyCODONE-acetaminophen (PERCOCET) 5-325 MG per tablet   3. Displacement of lumbar intervertebral disc without myelopathy  pregabalin (LYRICA) 150 MG capsule    oxyCODONE-acetaminophen (PERCOCET) 5-325 MG per tablet   4. Sacrum sprain, sequela  pregabalin (LYRICA) 150 MG capsule    oxyCODONE-acetaminophen (PERCOCET) 5-325 MG per tablet   5. Sprain of ligament of lumbosacral joint, sequela  pregabalin (LYRICA) 150 MG capsule    oxyCODONE-acetaminophen (PERCOCET) 5-325 MG per tablet   6.  Postlaminectomy syndrome, lumbar region Plan:    Percocet/Pregabalin both prn  Isometric lumbar exercise along with right leg strengthening  Fall precautions  The current medical regimen is effective;  continue present plan and medications. Controlled Substances Monitoring:     RX Monitoring 5/13/2019   Attestation The Prescription Monitoring Report for this patient was reviewed today. Chronic Pain Routine Monitoring No signs of potential drug abuse or diversion identified: otherwise, see note documentation       Return in about 2 months (around 7/13/2019), or (f/u visit for [facility]), for follow up. No orders of the defined types were placed in this encounter. Patient given educational materials - see patient instructions. Discussed use, benefit,and side effects of prescribed medications. All patient questions answered. Pt voiced understanding. Reviewed health maintenance. Instructed to continue current medications, diet and exercise. Patient agreed withtreatment plan. Follow up as directed. Electronically signed by Sarah Oakley MD on 5/13/2019 at 8:07 PMVisit Information    Have you changed or started any medications since your last visit including any over-the-counter medicines, vitamins, or herbal medicines? no   Are you having any side effects from any of your medications? -  no  Have you stopped taking any of your medications? Is so, why? -  no    Have you seen any other physician or provider since your last visit? No  Have you had any other diagnostic tests since your last visit? No  Have you been seen in the emergency room and/or had an admission to a hospital since we last saw you? No  Have you had your routine dental cleaning in the past 6 months? yes     Have you activated your [employer] account? If not, what are your barriers?  Yes     Patient Care Team:  Sarah Oakley MD as PCP - Dakota Johnson MD as PCP - [facility] Attributed Provider    Medical History Review  Past Medical, Family, and Social History reviewed and does not contribute to the patient presenting condition    Health Maintenance   Topic Date Due    Hepatitis C screen  1955    Pneumococcal 0-64 years Vaccine (1 of 1 - PPSV23) 02/22/1961    HIV screen  02/22/1970    DTaP/Tdap/Td vaccine (1 - Tdap) 02/22/1974    Cervical cancer screen  02/22/1976    Shingles Vaccine (1 of 2) 02/22/2005    Colon cancer screen colonoscopy  02/22/2005    Lipid screen  10/27/2019    Breast cancer screen  10/08/2020    Flu vaccine  Completed

## 2019-05-21 ENCOUNTER — HOSPITAL ENCOUNTER (OUTPATIENT)
Dept: MRI IMAGING | Age: 64
Discharge: HOME | End: 2019-05-23
Payer: MEDICARE

## 2019-05-21 DIAGNOSIS — R42 DIZZINESS AND GIDDINESS: ICD-10-CM

## 2019-05-21 DIAGNOSIS — H91.8X3 OTHER SPECIFIED HEARING LOSS OF BOTH EARS: ICD-10-CM

## 2019-05-21 DIAGNOSIS — H93.13 BILATERAL TINNITUS: ICD-10-CM

## 2019-05-21 LAB
BUN BLDV-MCNC: 14 MG/DL (ref 8–23)
CREAT SERPL-MCNC: 0.7 MG/DL (ref 0.5–0.9)
GFR AFRICAN AMERICAN: >60 ML/MIN
GFR NON-AFRICAN AMERICAN: >60 ML/MIN
GFR SERPL CREATININE-BSD FRML MDRD: NORMAL ML/MIN/{1.73_M2}
GFR SERPL CREATININE-BSD FRML MDRD: NORMAL ML/MIN/{1.73_M2}

## 2019-05-21 PROCEDURE — 6360000004 HC RX CONTRAST MEDICATION: Performed by: OTOLARYNGOLOGY

## 2019-05-21 PROCEDURE — 84520 ASSAY OF UREA NITROGEN: CPT

## 2019-05-21 PROCEDURE — 82565 ASSAY OF CREATININE: CPT

## 2019-05-21 PROCEDURE — 36415 COLL VENOUS BLD VENIPUNCTURE: CPT

## 2019-05-21 PROCEDURE — 70553 MRI BRAIN STEM W/O & W/DYE: CPT

## 2019-05-21 PROCEDURE — 2580000003 HC RX 258: Performed by: OTOLARYNGOLOGY

## 2019-05-21 PROCEDURE — A9579 GAD-BASE MR CONTRAST NOS,1ML: HCPCS | Performed by: OTOLARYNGOLOGY

## 2019-05-21 RX ORDER — SODIUM CHLORIDE 0.9 % (FLUSH) 0.9 %
10 SYRINGE (ML) INJECTION PRN
Status: DISCONTINUED | OUTPATIENT
Start: 2019-05-21 | End: 2019-05-24 | Stop reason: HOSPADM

## 2019-05-21 RX ADMIN — GADOTERIDOL 15 ML: 279.3 INJECTION, SOLUTION INTRAVENOUS at 17:38

## 2019-05-21 RX ADMIN — Medication 10 ML: at 17:38

## 2019-06-04 ENCOUNTER — OFFICE VISIT (OUTPATIENT)
Dept: FAMILY MEDICINE CLINIC | Age: 64
End: 2019-06-04
Payer: MEDICARE

## 2019-06-04 VITALS
HEIGHT: 67 IN | HEART RATE: 68 BPM | DIASTOLIC BLOOD PRESSURE: 54 MMHG | BODY MASS INDEX: 28.41 KG/M2 | SYSTOLIC BLOOD PRESSURE: 104 MMHG | WEIGHT: 181 LBS | OXYGEN SATURATION: 95 %

## 2019-06-04 DIAGNOSIS — H81.10 BENIGN PAROXYSMAL POSITIONAL VERTIGO, UNSPECIFIED LATERALITY: ICD-10-CM

## 2019-06-04 DIAGNOSIS — R41.3 MEMORY PROBLEM: ICD-10-CM

## 2019-06-04 DIAGNOSIS — R61 EXCESSIVE SWEATING: ICD-10-CM

## 2019-06-04 DIAGNOSIS — J44.9 CHRONIC OBSTRUCTIVE PULMONARY DISEASE, UNSPECIFIED COPD TYPE (HCC): ICD-10-CM

## 2019-06-04 DIAGNOSIS — E66.9 OBESITY (BMI 30-39.9): ICD-10-CM

## 2019-06-04 DIAGNOSIS — H81.02 MENIERE'S DISEASE OF LEFT EAR: ICD-10-CM

## 2019-06-04 DIAGNOSIS — F41.1 GENERALIZED ANXIETY DISORDER: ICD-10-CM

## 2019-06-04 DIAGNOSIS — G31.84 MINIMAL COGNITIVE IMPAIRMENT: Primary | ICD-10-CM

## 2019-06-04 PROCEDURE — G8417 CALC BMI ABV UP PARAM F/U: HCPCS | Performed by: FAMILY MEDICINE

## 2019-06-04 PROCEDURE — 3017F COLORECTAL CA SCREEN DOC REV: CPT | Performed by: FAMILY MEDICINE

## 2019-06-04 PROCEDURE — 99214 OFFICE O/P EST MOD 30 MIN: CPT | Performed by: FAMILY MEDICINE

## 2019-06-04 PROCEDURE — 1036F TOBACCO NON-USER: CPT | Performed by: FAMILY MEDICINE

## 2019-06-04 PROCEDURE — 3023F SPIROM DOC REV: CPT | Performed by: FAMILY MEDICINE

## 2019-06-04 PROCEDURE — G8926 SPIRO NO PERF OR DOC: HCPCS | Performed by: FAMILY MEDICINE

## 2019-06-04 PROCEDURE — G8428 CUR MEDS NOT DOCUMENT: HCPCS | Performed by: FAMILY MEDICINE

## 2019-06-04 RX ORDER — MECLIZINE HYDROCHLORIDE 25 MG/1
12.5 TABLET ORAL 2 TIMES DAILY
Qty: 60 TABLET | Refills: 2 | Status: SHIPPED | OUTPATIENT
Start: 2019-06-04 | End: 2019-07-04

## 2019-06-04 RX ORDER — MEMANTINE HYDROCHLORIDE 10 MG/1
TABLET ORAL
Qty: 58 TABLET | Refills: 2 | Status: ON HOLD | OUTPATIENT
Start: 2019-06-04 | End: 2019-09-29 | Stop reason: HOSPADM

## 2019-06-04 ASSESSMENT — ENCOUNTER SYMPTOMS
SINUS PRESSURE: 0
ANAL BLEEDING: 0
PHOTOPHOBIA: 0
COLOR CHANGE: 0
EYE PAIN: 0
WHEEZING: 0
DIARRHEA: 0
SORE THROAT: 0
EYE DISCHARGE: 0
ABDOMINAL DISTENTION: 0
CHEST TIGHTNESS: 0
APNEA: 0
FACIAL SWELLING: 0
SHORTNESS OF BREATH: 0
VOMITING: 0
BACK PAIN: 0
ABDOMINAL PAIN: 0
CONSTIPATION: 0
NAUSEA: 0
TROUBLE SWALLOWING: 0

## 2019-06-04 NOTE — PROGRESS NOTES
Loi Moseley MD, PhD [facility] Erica[address]Gavin is a 59 y.o. female who presents today for her medical conditions/complaints as noted below. Ayala Schmidt is c/o of   Chief Complaint   Patient presents with    Results     discuss MRI    Other     requesting Handicap placard    Fatigue         HPI:     Other   This is a recurrent (vertigo) problem. The current episode started more than 1 month ago. The problem has been gradually worsening. Associated symptoms include arthralgias and fatigue. Pertinent negatives include no abdominal pain, chest pain, congestion, fever, nausea, neck pain, rash, sore throat or vomiting. Fatigue   This is a chronic problem. The current episode started more than 1 year ago. The problem has been gradually worsening. Associated symptoms include arthralgias and fatigue. Pertinent negatives include no abdominal pain, chest pain, congestion, fever, nausea, neck pain, rash, sore throat or vomiting.        No results found for: LABA1C          ( goal A1C is < 7)   No results found for: LABMICR  LDL Cholesterol (mg/dL)   Date Value   10/27/2014 91   10/16/2012 128 (H)   11/07/2011 165 (H)       (goal LDL is <100)   AST (U/L)   Date Value   10/31/2018 20     ALT (U/L)   Date Value   10/31/2018 12     BUN (mg/dL)   Date Value   05/21/2019 14     BP Readings from Last 3 Encounters:   06/04/19 (!) 104/54   05/13/19 102/61   04/12/19 110/66          (goal 120/80)    Past Medical History:   Diagnosis Date    Arthritis     Depression     Displacement of intervertebral disc, site unspecified, without myelopathy     BWC    Displacement of lumbar intervertebral disc without myelopathy     bwc    Hyperlipidemia     Lumbago     Bwc    Sprain of lumbar region     bwc    Sprain of lumbosacral (joint) (ligament)     BWC    Sprain of sacrum     BWC      Past Surgical History:   Procedure Laterality Date    ABDOMINAL EXPLORATION SURGERY      BACK SURGERY      X2    COLON SURGERY      COLONOSCOPY      DILATION AND CURETTAGE OF UTERUS      FOOT SURGERY Right     HAND SURGERY Right 6/18/14    OVARY REMOVAL Bilateral     WRIST ARTHROPLASTY Left        Family History   Problem Relation Age of Onset    Heart Disease Father     High Blood Pressure Father        Social History     Tobacco Use    Smoking status: Former Smoker    Smokeless tobacco: Never Used    Tobacco comment: quit 32 years ago   Substance Use Topics    Alcohol use: Yes     Alcohol/week: 1.2 oz     Types: 2 Cans of beer per week     Comment: OCCASSION      Current Outpatient Medications   Medication Sig Dispense Refill    memantine (NAMENDA) 10 MG tablet Take 10 mg twice a day for 2 weeks then one daily 58 tablet 2    meclizine (ANTIVERT) 25 MG tablet Take 0.5 tablets by mouth 2 times daily 60 tablet 2    pregabalin (LYRICA) 150 MG capsule Take 1 capsule by mouth 2 times daily for 60 doses. 60 capsule 0    oxyCODONE-acetaminophen (PERCOCET) 5-325 MG per tablet Take 1 tablet by mouth 2 times daily for 30 days. 60 tablet 0    FLUoxetine (PROZAC) 20 MG capsule TAKE 2 CAPSULES BY MOUTH IN THE MORNING 60 capsule 2    traZODone (DESYREL) 100 MG tablet TAKE 1 TABLET BY MOUTH AT BEDTIME 30 tablet 0    simvastatin (ZOCOR) 40 MG tablet TAKE 1 TABLET BY MOUTH  EVERY NIGHT AT BEDTIME 90 tablet 1    aspirin (ASPIRIN CHILDRENS) 81 MG chewable tablet Take 1 tablet by mouth daily 30 tablet 0    Biotin 800 MCG TABS Take 800 mcg by mouth daily.  calcium-vitamin D (OSCAL-500) 500-200 MG-UNIT per tablet Take 1 tablet by mouth 2 times daily.  omeprazole (PRILOSEC) 40 MG delayed release capsule Take 1 capsule by mouth daily for 30 doses 30 capsule 0     No current facility-administered medications for this visit.       Allergies   Allergen Reactions    Ambien [Zolpidem Tartrate] Shortness Of Breath    Ativan [Lorazepam] Shortness Of Breath appears well-developed. No distress. HENT:   Head: Normocephalic. Neck: Normal range of motion. Neck supple. No thyromegaly present. Cardiovascular: Normal rate, regular rhythm and normal heart sounds. No murmur heard. Pulmonary/Chest: She has wheezes. She has no rales. She exhibits no tenderness. Abdominal: Soft. Bowel sounds are normal. She exhibits no distension and no mass. There is no tenderness. There is no rebound and no guarding. Musculoskeletal: Normal range of motion. She exhibits no edema. Lymphadenopathy:     She has no cervical adenopathy. Neurological: She is alert and oriented to person, place, and time. Not able to recall two from three words after 2 minutes   Skin: Skin is warm. No rash noted. Psychiatric: Judgment and thought content normal. Her mood appears anxious. Her affect is blunt. Her speech is delayed. She is slowed. She exhibits a depressed mood. She exhibits abnormal recent memory. Nursing note and vitals reviewed. BP (!) 104/54   Pulse 68   Ht 5' 7.01\" (1.702 m)   Wt 181 lb (82.1 kg)   SpO2 95%   BMI 28.34 kg/m²     Assessment:       Diagnosis Orders   1. Minimal cognitive impairment  memantine (NAMENDA) 10 MG tablet   2. 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 Obesity (BMI 30-39.9)     4. Generalized anxiety disorder     5. Memory problem     6. Benign paroxysmal positional vertigo, unspecified laterality     7. Excessive sweating     8.  Chronic obstructive pulmonary disease, unspecified COPD type ([facility] [address]                   Plan:    Meclizine 12.5 mg bid  Fall precautions  F/U Dr [facility] ENT to establish diagnosis Meniers v positional vertigo  Start Namenda 10 mg daily x 2 weeks then bid   present  Memory training daily  PET scan brain in the near future, for now MRI brain ordered by ENT reviewed - vascular white matter changes  Otherwise the current medical regimen is effective;

## 2019-06-10 ENCOUNTER — OFFICE VISIT (OUTPATIENT)
Dept: FAMILY MEDICINE CLINIC | Age: 64
End: 2019-06-10
Payer: COMMERCIAL

## 2019-06-10 VITALS
HEART RATE: 69 BPM | WEIGHT: 181 LBS | OXYGEN SATURATION: 95 % | DIASTOLIC BLOOD PRESSURE: 51 MMHG | HEIGHT: 67 IN | SYSTOLIC BLOOD PRESSURE: 97 MMHG | BODY MASS INDEX: 28.41 KG/M2

## 2019-06-10 DIAGNOSIS — S33.9XXS SPRAIN OF LIGAMENT OF LUMBOSACRAL JOINT, SEQUELA: ICD-10-CM

## 2019-06-10 DIAGNOSIS — M96.1 POSTLAMINECTOMY SYNDROME, LUMBAR REGION: ICD-10-CM

## 2019-06-10 DIAGNOSIS — M51.26 DISPLACEMENT OF LUMBAR INTERVERTEBRAL DISC WITHOUT MYELOPATHY: Primary | ICD-10-CM

## 2019-06-10 DIAGNOSIS — M51.26 DISPLACEMENT OF INTERVERTEBRAL DISC OF LUMBAR REGION: ICD-10-CM

## 2019-06-10 DIAGNOSIS — S39.012S STRAIN OF LUMBAR REGION, SEQUELA: ICD-10-CM

## 2019-06-10 DIAGNOSIS — M51.26 DISPLACEMENT OF LUMBAR INTERVERTEBRAL DISC WITHOUT MYELOPATHY: ICD-10-CM

## 2019-06-10 DIAGNOSIS — S33.8XXS SACRUM SPRAIN, SEQUELA: ICD-10-CM

## 2019-06-10 DIAGNOSIS — F51.01 PRIMARY INSOMNIA: Primary | ICD-10-CM

## 2019-06-10 PROCEDURE — 99214 OFFICE O/P EST MOD 30 MIN: CPT | Performed by: FAMILY MEDICINE

## 2019-06-10 RX ORDER — PREGABALIN 150 MG/1
150 CAPSULE ORAL 2 TIMES DAILY
Qty: 60 CAPSULE | Refills: 0 | Status: SHIPPED | OUTPATIENT
Start: 2019-06-10 | End: 2019-07-10 | Stop reason: SDUPTHER

## 2019-06-10 RX ORDER — OXYCODONE HYDROCHLORIDE AND ACETAMINOPHEN 5; 325 MG/1; MG/1
1 TABLET ORAL 2 TIMES DAILY
Qty: 60 TABLET | Refills: 0 | Status: SHIPPED | OUTPATIENT
Start: 2019-06-10 | End: 2019-07-10 | Stop reason: SDUPTHER

## 2019-06-10 RX ORDER — TRAZODONE HYDROCHLORIDE 100 MG/1
TABLET ORAL
Qty: 30 TABLET | Refills: 0 | Status: SHIPPED | OUTPATIENT
Start: 2019-06-10 | End: 2019-07-10 | Stop reason: SDUPTHER

## 2019-06-10 RX ORDER — FLUOXETINE HYDROCHLORIDE 20 MG/1
CAPSULE ORAL
Qty: 60 CAPSULE | Refills: 2 | Status: SHIPPED | OUTPATIENT
Start: 2019-06-10 | End: 2019-10-11 | Stop reason: SDUPTHER

## 2019-06-10 ASSESSMENT — ENCOUNTER SYMPTOMS
DIARRHEA: 0
WHEEZING: 0
CHEST TIGHTNESS: 0
SHORTNESS OF BREATH: 0
TROUBLE SWALLOWING: 0
EYE DISCHARGE: 0
PHOTOPHOBIA: 0
ABDOMINAL DISTENTION: 0
SINUS PRESSURE: 0
FACIAL SWELLING: 0
ANAL BLEEDING: 0
BOWEL INCONTINENCE: 0
NAUSEA: 0
APNEA: 0
COLOR CHANGE: 0
SORE THROAT: 0
EYE PAIN: 0
BACK PAIN: 1
VOMITING: 0
ABDOMINAL PAIN: 0
CONSTIPATION: 0

## 2019-06-10 NOTE — PROGRESS NOTES
intervertebral disc, site unspecified, without myelopathy     BWC    Displacement of lumbar intervertebral disc without myelopathy     bwc    Hyperlipidemia     Lumbago     Bwc    Sprain of lumbar region     bwc    Sprain of lumbosacral (joint) (ligament)     BWC    Sprain of sacrum     BWC      Past Surgical History:   Procedure Laterality Date    ABDOMINAL EXPLORATION SURGERY      BACK SURGERY      X2    COLON SURGERY      COLONOSCOPY      DILATION AND CURETTAGE OF UTERUS      FOOT SURGERY Right     HAND SURGERY Right 6/18/14    OVARY REMOVAL Bilateral     WRIST ARTHROPLASTY Left        Family History   Problem Relation Age of Onset    Heart Disease Father     High Blood Pressure Father        Social History     Tobacco Use    Smoking status: Former Smoker    Smokeless tobacco: Never Used    Tobacco comment: quit 32 years ago   Substance Use Topics    Alcohol use: Yes     Alcohol/week: 1.2 oz     Types: 2 Cans of beer per week     Comment: OCCASSION      Current Outpatient Medications   Medication Sig Dispense Refill    FLUoxetine (PROZAC) 20 MG capsule TAKE 2 CAPSULES BY MOUTH IN THE MORNING 60 capsule 2    oxyCODONE-acetaminophen (PERCOCET) 5-325 MG per tablet Take 1 tablet by mouth 2 times daily for 30 days. 60 tablet 0    pregabalin (LYRICA) 150 MG capsule Take 1 capsule by mouth 2 times daily for 60 doses.  60 capsule 0    traZODone (DESYREL) 100 MG tablet TAKE 1 TABLET BY MOUTH AT BEDTIME 30 tablet 0    memantine (NAMENDA) 10 MG tablet Take 10 mg twice a day for 2 weeks then one daily 58 tablet 2    meclizine (ANTIVERT) 25 MG tablet Take 0.5 tablets by mouth 2 times daily 60 tablet 2    simvastatin (ZOCOR) 40 MG tablet TAKE 1 TABLET BY MOUTH  EVERY NIGHT AT BEDTIME 90 tablet 1    omeprazole (PRILOSEC) 40 MG delayed release capsule Take 1 capsule by mouth daily for 30 doses 30 capsule 0    aspirin (ASPIRIN CHILDRENS) 81 MG chewable tablet Take 1 tablet by mouth daily 30 tablet 0  Biotin 800 MCG TABS Take 800 mcg by mouth daily.  calcium-vitamin D (OSCAL-500) 500-200 MG-UNIT per tablet Take 1 tablet by mouth 2 times daily. No current facility-administered medications for this visit. Allergies   Allergen Reactions    Ambien [Zolpidem Tartrate] Shortness Of Breath    Ativan [Lorazepam] Shortness Of Breath    Darvon [Propoxyphene] Other (See Comments)     Mental changes    Restoril [Temazepam]     Topamax [Topiramate]        Health Maintenance   Topic Date Due    Hepatitis C screen  1955    Pneumococcal 0-64 years Vaccine (1 of 1 - PPSV23) 02/22/1961    HIV screen  02/22/1970    DTaP/Tdap/Td vaccine (1 - Tdap) 02/22/1974    Cervical cancer screen  02/22/1976    Shingles Vaccine (1 of 2) 02/22/2005    Colon cancer screen colonoscopy  02/22/2005    Lipid screen  10/27/2019    Breast cancer screen  10/08/2020    Flu vaccine  Completed       Subjective:      Review of Systems   Constitutional: Negative for fatigue, fever, unexpected weight change and weight loss. HENT: Negative for congestion, ear discharge, facial swelling, sinus pressure, sore throat and trouble swallowing. Eyes: Negative for photophobia, pain and discharge. Respiratory: Negative for apnea, chest tightness, shortness of breath and wheezing. Cardiovascular: Negative for chest pain and palpitations. Gastrointestinal: Negative for abdominal distention, abdominal pain, anal bleeding, bowel incontinence, constipation, diarrhea, nausea and vomiting. Endocrine: Negative for cold intolerance, heat intolerance, polydipsia, polyphagia and polyuria. Genitourinary: Negative for bladder incontinence, difficulty urinating, dysuria, flank pain, frequency, hematuria and pelvic pain. Musculoskeletal: Positive for arthralgias, back pain and gait problem. Negative for neck pain. Skin: Negative for color change and rash.    Neurological: Positive for tingling, weakness, numbness and paresthesias. Negative for dizziness, syncope, facial asymmetry, speech difficulty and light-headedness. Hematological: Negative for adenopathy. Psychiatric/Behavioral: Positive for dysphoric mood and sleep disturbance. Negative for agitation, behavioral problems, confusion, hallucinations and suicidal ideas. The patient is nervous/anxious. The patient is not hyperactive. Objective:     Physical Exam   Constitutional: She is oriented to person, place, and time. She appears well-developed. No distress. HENT:   Head: Normocephalic. Neck: Normal range of motion. Neck supple. No thyromegaly present. Cardiovascular: Normal rate, regular rhythm and normal heart sounds. No murmur heard. Pulmonary/Chest: Breath sounds normal. She has no wheezes. She has no rales. She exhibits no tenderness. Abdominal: Soft. Bowel sounds are normal. She exhibits no distension and no mass. There is no tenderness. There is no rebound and no guarding. Musculoskeletal: She exhibits tenderness. She exhibits no edema. Lumbar back: She exhibits decreased range of motion and tenderness. Back:    Lymphadenopathy:     She has no cervical adenopathy. Neurological: She is alert and oriented to person, place, and time. Skin: Skin is warm. No rash noted. Psychiatric: Her speech is normal and behavior is normal. Judgment and thought content normal. Her mood appears anxious. Her affect is blunt and labile. Cognition and memory are normal. She exhibits a depressed mood. Nursing note and vitals reviewed. BP (!) 97/51   Pulse 69   Ht 5' 7.01\" (1.702 m)   Wt 181 lb (82.1 kg)   SpO2 95%   BMI 28.34 kg/m²     Assessment:       Diagnosis Orders   1. Displacement of lumbar intervertebral disc without myelopathy  FLUoxetine (PROZAC) 20 MG capsule    oxyCODONE-acetaminophen (PERCOCET) 5-325 MG per tablet    pregabalin (LYRICA) 150 MG capsule   2.  Strain of lumbar region, sequela  oxyCODONE-acetaminophen (PERCOCET) 5-325 MG per tablet    pregabalin (LYRICA) 150 MG capsule   3. Sacrum sprain, sequela  FLUoxetine (PROZAC) 20 MG capsule    oxyCODONE-acetaminophen (PERCOCET) 5-325 MG per tablet    pregabalin (LYRICA) 150 MG capsule   4. Displacement of intervertebral disc of lumbar region  FLUoxetine (PROZAC) 20 MG capsule    oxyCODONE-acetaminophen (PERCOCET) 5-325 MG per tablet    pregabalin (LYRICA) 150 MG capsule   5. Sprain of ligament of lumbosacral joint, sequela  FLUoxetine (PROZAC) 20 MG capsule    oxyCODONE-acetaminophen (PERCOCET) 5-325 MG per tablet    pregabalin (LYRICA) 150 MG capsule   6. Postlaminectomy syndrome, lumbar region                   Plan:    No improvement, increased weakness both legs  The current medical regimen is effective;  continue present plan and medications. Controlled Substance Monitoring:    Acute and Chronic Pain Monitoring:   RX Monitoring 6/10/2019   Attestation -   Periodic Controlled Substance Monitoring No signs of potential drug abuse or diversion identified. Percocet prn/lyrica bid  Isometric lumbar exercise and both legs strenghtening daily  Weight reduction    Return in about 2 months (around 8/10/2019), or (this is f/u visit for [facility]), for follow up. No orders of the defined types were placed in this encounter. Patient given educational materials - see patient instructions. Discussed use, benefit,and side effects of prescribed medications. All patient questions answered. Pt voiced understanding. Reviewed health maintenance. Instructed to continue current medications, diet and exercise. Patient agreed withtreatment plan. Follow up as directed. Electronically signed by Shawn Shaikh MD on 6/10/2019 at 12:41 PMVisit Information    Have you changed or started any medications since your last visit including any over-the-counter medicines, vitamins, or herbal medicines?  no   Are you having any side effects from any of your medications? -  no  Have you stopped taking any of your medications? Is so, why? -  no    Have you seen any other physician or provider since your last visit? No  Have you had any other diagnostic tests since your last visit? No  Have you been seen in the emergency room and/or had an admission to a hospital since we last saw you? No  Have you had your routine dental cleaning in the past 6 months? yes     Have you activated your [employer] account? If not, what are your barriers?  Yes     Patient Care Team:  Vicki Smith MD as PCP - Pavel Loco MD as PCP - [facility] Provider    Medical History Review  Past Medical, Family, and Social History reviewed and does not contribute to the patient presenting condition    Health Maintenance   Topic Date Due    Hepatitis C screen  1955    Pneumococcal 0-64 years Vaccine (1 of 1 - PPSV23) 02/22/1961    HIV screen  02/22/1970    DTaP/Tdap/Td vaccine (1 - Tdap) 02/22/1974    Cervical cancer screen  02/22/1976    Shingles Vaccine (1 of 2) 02/22/2005    Colon cancer screen colonoscopy  02/22/2005    Lipid screen  10/27/2019    Breast cancer screen  10/08/2020    Flu vaccine  Completed

## 2019-07-10 ENCOUNTER — OFFICE VISIT (OUTPATIENT)
Dept: FAMILY MEDICINE CLINIC | Age: 64
End: 2019-07-10
Payer: COMMERCIAL

## 2019-07-10 VITALS
DIASTOLIC BLOOD PRESSURE: 61 MMHG | WEIGHT: 184.6 LBS | BODY MASS INDEX: 28.97 KG/M2 | HEIGHT: 67 IN | SYSTOLIC BLOOD PRESSURE: 94 MMHG | HEART RATE: 66 BPM | OXYGEN SATURATION: 94 %

## 2019-07-10 DIAGNOSIS — S33.8XXS SACRUM SPRAIN, SEQUELA: ICD-10-CM

## 2019-07-10 DIAGNOSIS — S39.012S STRAIN OF LUMBAR REGION, SEQUELA: ICD-10-CM

## 2019-07-10 DIAGNOSIS — S33.9XXS SPRAIN OF LIGAMENT OF LUMBOSACRAL JOINT, SEQUELA: ICD-10-CM

## 2019-07-10 DIAGNOSIS — M51.26 DISPLACEMENT OF INTERVERTEBRAL DISC OF LUMBAR REGION: Primary | ICD-10-CM

## 2019-07-10 DIAGNOSIS — M51.26 DISPLACEMENT OF LUMBAR INTERVERTEBRAL DISC WITHOUT MYELOPATHY: ICD-10-CM

## 2019-07-10 PROCEDURE — 99214 OFFICE O/P EST MOD 30 MIN: CPT | Performed by: FAMILY MEDICINE

## 2019-07-10 RX ORDER — SULFAMETHOXAZOLE AND TRIMETHOPRIM 800; 160 MG/1; MG/1
1 TABLET ORAL 2 TIMES DAILY
Qty: 14 TABLET | Refills: 0 | Status: SHIPPED | OUTPATIENT
Start: 2019-07-10 | End: 2019-07-17

## 2019-07-10 RX ORDER — PREGABALIN 150 MG/1
150 CAPSULE ORAL 2 TIMES DAILY
Qty: 60 CAPSULE | Refills: 0 | Status: SHIPPED | OUTPATIENT
Start: 2019-07-10 | End: 2019-08-13 | Stop reason: SDUPTHER

## 2019-07-10 RX ORDER — TRAZODONE HYDROCHLORIDE 100 MG/1
TABLET ORAL
Qty: 30 TABLET | Refills: 0 | Status: SHIPPED | OUTPATIENT
Start: 2019-07-10 | End: 2019-09-04 | Stop reason: SDUPTHER

## 2019-07-10 RX ORDER — OXYCODONE HYDROCHLORIDE AND ACETAMINOPHEN 5; 325 MG/1; MG/1
1 TABLET ORAL 2 TIMES DAILY
Qty: 60 TABLET | Refills: 0 | Status: SHIPPED | OUTPATIENT
Start: 2019-07-10 | End: 2019-08-13 | Stop reason: SDUPTHER

## 2019-07-10 ASSESSMENT — ENCOUNTER SYMPTOMS
CONSTIPATION: 0
WHEEZING: 0
TROUBLE SWALLOWING: 0
PHOTOPHOBIA: 0
BACK PAIN: 1
SHORTNESS OF BREATH: 0
NAUSEA: 0
COLOR CHANGE: 0
ANAL BLEEDING: 0
EYE DISCHARGE: 0
VOMITING: 0
SINUS PRESSURE: 0
CHEST TIGHTNESS: 0
DIARRHEA: 0
SORE THROAT: 0
APNEA: 0
ABDOMINAL PAIN: 0
FACIAL SWELLING: 0
ABDOMINAL DISTENTION: 0
EYE PAIN: 0

## 2019-07-10 NOTE — PROGRESS NOTES
Neck: Normal range of motion. Neck supple. No thyromegaly present. Cardiovascular: Normal rate, regular rhythm and normal heart sounds. No murmur heard. Pulmonary/Chest: Breath sounds normal. She has no wheezes. She has no rales. She exhibits no tenderness. Abdominal: Soft. Bowel sounds are normal. She exhibits no distension and no mass. There is no tenderness. There is no rebound and no guarding. Musculoskeletal: She exhibits tenderness. She exhibits no edema. Lumbar back: She exhibits decreased range of motion and tenderness. Back:    Lymphadenopathy:     She has no cervical adenopathy. Neurological: She is alert and oriented to person, place, and time. Skin: Skin is warm. No rash noted. Psychiatric: Judgment and thought content normal. Her mood appears anxious. Her affect is blunt. Her speech is delayed. She is slowed. Cognition and memory are normal. She exhibits a depressed mood. Nursing note and vitals reviewed. BP 94/61   Pulse 66   Ht 5' 7\" (1.702 m)   Wt 184 lb 9.6 oz (83.7 kg)   SpO2 94%   BMI 28.91 kg/m²     Assessment:       Diagnosis Orders   1. Displacement of intervertebral disc of lumbar region  oxyCODONE-acetaminophen (PERCOCET) 5-325 MG per tablet    pregabalin (LYRICA) 150 MG capsule    traZODone (DESYREL) 100 MG tablet   2. Strain of lumbar region, sequela  oxyCODONE-acetaminophen (PERCOCET) 5-325 MG per tablet    pregabalin (LYRICA) 150 MG capsule    traZODone (DESYREL) 100 MG tablet   3. Displacement of lumbar intervertebral disc without myelopathy  oxyCODONE-acetaminophen (PERCOCET) 5-325 MG per tablet    pregabalin (LYRICA) 150 MG capsule    traZODone (DESYREL) 100 MG tablet   4. Sacrum sprain, sequela  oxyCODONE-acetaminophen (PERCOCET) 5-325 MG per tablet    pregabalin (LYRICA) 150 MG capsule    traZODone (DESYREL) 100 MG tablet   5.  Sprain of ligament of lumbosacral joint, sequela  oxyCODONE-acetaminophen (PERCOCET) 5-325 MG per tablet    pregabalin

## 2019-07-18 DIAGNOSIS — E78.5 DYSLIPIDEMIA: ICD-10-CM

## 2019-07-18 RX ORDER — SIMVASTATIN 40 MG
TABLET ORAL
Qty: 90 TABLET | Refills: 1 | Status: SHIPPED | OUTPATIENT
Start: 2019-07-18 | End: 2019-12-16 | Stop reason: SDUPTHER

## 2019-08-13 ENCOUNTER — OFFICE VISIT (OUTPATIENT)
Dept: FAMILY MEDICINE CLINIC | Age: 64
End: 2019-08-13
Payer: COMMERCIAL

## 2019-08-13 VITALS
RESPIRATION RATE: 14 BRPM | WEIGHT: 190 LBS | HEART RATE: 64 BPM | BODY MASS INDEX: 29.82 KG/M2 | HEIGHT: 67 IN | DIASTOLIC BLOOD PRESSURE: 60 MMHG | OXYGEN SATURATION: 93 % | SYSTOLIC BLOOD PRESSURE: 102 MMHG

## 2019-08-13 DIAGNOSIS — M51.26 DISPLACEMENT OF LUMBAR INTERVERTEBRAL DISC WITHOUT MYELOPATHY: Primary | ICD-10-CM

## 2019-08-13 DIAGNOSIS — S33.8XXS SACRUM SPRAIN, SEQUELA: ICD-10-CM

## 2019-08-13 DIAGNOSIS — S39.012S STRAIN OF LUMBAR REGION, SEQUELA: ICD-10-CM

## 2019-08-13 DIAGNOSIS — S33.9XXS SPRAIN OF LIGAMENT OF LUMBOSACRAL JOINT, SEQUELA: ICD-10-CM

## 2019-08-13 DIAGNOSIS — M51.26 DISPLACEMENT OF INTERVERTEBRAL DISC OF LUMBAR REGION: ICD-10-CM

## 2019-08-13 PROCEDURE — 99214 OFFICE O/P EST MOD 30 MIN: CPT | Performed by: FAMILY MEDICINE

## 2019-08-13 RX ORDER — OXYCODONE HYDROCHLORIDE AND ACETAMINOPHEN 5; 325 MG/1; MG/1
1 TABLET ORAL 2 TIMES DAILY
Qty: 60 TABLET | Refills: 0 | Status: SHIPPED | OUTPATIENT
Start: 2019-08-13 | End: 2019-09-13 | Stop reason: SDUPTHER

## 2019-08-13 RX ORDER — PREGABALIN 150 MG/1
150 CAPSULE ORAL 2 TIMES DAILY
Qty: 60 CAPSULE | Refills: 0 | Status: SHIPPED | OUTPATIENT
Start: 2019-08-13 | End: 2019-09-13 | Stop reason: SDUPTHER

## 2019-08-13 ASSESSMENT — ENCOUNTER SYMPTOMS
WHEEZING: 0
SORE THROAT: 0
EYE DISCHARGE: 0
EYE PAIN: 0
ABDOMINAL DISTENTION: 0
FACIAL SWELLING: 0
VOMITING: 0
BACK PAIN: 1
DIARRHEA: 0
ANAL BLEEDING: 0
COLOR CHANGE: 0
SINUS PRESSURE: 0
NAUSEA: 0
CONSTIPATION: 0
APNEA: 0
ABDOMINAL PAIN: 0
TROUBLE SWALLOWING: 0
PHOTOPHOBIA: 0
SHORTNESS OF BREATH: 0
CHEST TIGHTNESS: 0

## 2019-08-13 NOTE — PROGRESS NOTES
 COLONOSCOPY      DILATION AND CURETTAGE OF UTERUS      FOOT SURGERY Right     HAND SURGERY Right 6/18/14    OVARY REMOVAL Bilateral     WRIST ARTHROPLASTY Left        Family History   Problem Relation Age of Onset    Heart Disease Father     High Blood Pressure Father        Social History     Tobacco Use    Smoking status: Former Smoker    Smokeless tobacco: Never Used    Tobacco comment: quit 32 years ago   Substance Use Topics    Alcohol use: Yes     Alcohol/week: 2.0 standard drinks     Types: 2 Cans of beer per week     Comment: OCCASSION      Current Outpatient Medications   Medication Sig Dispense Refill    oxyCODONE-acetaminophen (PERCOCET) 5-325 MG per tablet Take 1 tablet by mouth 2 times daily for 30 days. 60 tablet 0    pregabalin (LYRICA) 150 MG capsule Take 1 capsule by mouth 2 times daily for 34 days. 60 capsule 0    simvastatin (ZOCOR) 40 MG tablet TAKE 1 TABLET BY MOUTH  EVERY NIGHT AT BEDTIME 90 tablet 1    traZODone (DESYREL) 100 MG tablet TAKE 1 TABLET BY MOUTH AT BEDTIME 30 tablet 0    FLUoxetine (PROZAC) 20 MG capsule TAKE 2 CAPSULES BY MOUTH IN THE MORNING 60 capsule 2    memantine (NAMENDA) 10 MG tablet Take 10 mg twice a day for 2 weeks then one daily 58 tablet 2    aspirin (ASPIRIN CHILDRENS) 81 MG chewable tablet Take 1 tablet by mouth daily 30 tablet 0    Biotin 800 MCG TABS Take 800 mcg by mouth daily.  calcium-vitamin D (OSCAL-500) 500-200 MG-UNIT per tablet Take 1 tablet by mouth 2 times daily.  omeprazole (PRILOSEC) 40 MG delayed release capsule Take 1 capsule by mouth daily for 30 doses 30 capsule 0     No current facility-administered medications for this visit.       Allergies   Allergen Reactions    Ambien [Zolpidem Tartrate] Shortness Of Breath    Ativan [Lorazepam] Shortness Of Breath    Darvon [Propoxyphene] Other (See Comments)     Mental changes    Restoril [Temazepam]     Topamax [Topiramate]        Health Maintenance   Topic Date Due    Monitoring 6/10/2019   Attestation -   Periodic Controlled Substance Monitoring No signs of potential drug abuse or diversion identified. Weight reduction    Return in about 2 months (around 10/13/2019) for follow up. No orders of the defined types were placed in this encounter. Patient given educational materials - see patient instructions. Discussed use, benefit,and side effects of prescribed medications. All patient questions answered. Pt voiced understanding. Reviewed health maintenance. Instructed to continue current medications, diet and exercise. Patient agreed withtreatment plan. Follow up as directed. Electronically signed by Grace Damico MD on 8/13/2019 at 12:06 PMVisit Information    Have you changed or started any medications since your last visit including any over-the-counter medicines, vitamins, or herbal medicines? no   Are you having any side effects from any of your medications? -  no  Have you stopped taking any of your medications? Is so, why? -  no    Have you seen any other physician or provider since your last visit? No  Have you had any other diagnostic tests since your last visit? No  Have you been seen in the emergency room and/or had an admission to a hospital since we last saw you? No  Have you had your routine dental cleaning in the past 6 months? no    Have you activated your [employer] account? If not, what are your barriers?  No:      Patient Care Team:  Grace Damico MD as PCP - Damaso Woods MD as PCP - [facility] Provider    Medical History Review  Past Medical, Family, and Social History reviewed and does not contribute to the patient presenting condition    Health Maintenance   Topic Date Due    Hepatitis C screen  1955    Pneumococcal 0-64 years Vaccine (1 of 1 - PPSV23) 02/22/1961    HIV screen  02/22/1970    DTaP/Tdap/Td vaccine (1 - Tdap) 02/22/1974    Cervical cancer screen  02/22/1976    Shingles

## 2019-08-14 ENCOUNTER — TELEPHONE (OUTPATIENT)
Dept: FAMILY MEDICINE CLINIC | Age: 64
End: 2019-08-14

## 2019-08-14 DIAGNOSIS — F51.01 PRIMARY INSOMNIA: ICD-10-CM

## 2019-08-14 DIAGNOSIS — E78.5 DYSLIPIDEMIA: ICD-10-CM

## 2019-08-14 DIAGNOSIS — R41.3 MEMORY PROBLEM: ICD-10-CM

## 2019-08-14 DIAGNOSIS — R07.9 CHEST PAIN, UNSPECIFIED TYPE: Primary | ICD-10-CM

## 2019-08-14 DIAGNOSIS — F41.1 GENERALIZED ANXIETY DISORDER: ICD-10-CM

## 2019-08-19 ENCOUNTER — HOSPITAL ENCOUNTER (OUTPATIENT)
Dept: NUCLEAR MEDICINE | Age: 64
Discharge: HOME | End: 2019-08-21
Payer: MEDICARE

## 2019-08-19 ENCOUNTER — HOSPITAL ENCOUNTER (OUTPATIENT)
Dept: NON INVASIVE DIAGNOSTICS | Age: 64
Discharge: HOME | End: 2019-08-19
Payer: MEDICARE

## 2019-08-19 VITALS — HEIGHT: 67 IN | WEIGHT: 190 LBS | BODY MASS INDEX: 29.82 KG/M2

## 2019-08-19 DIAGNOSIS — F41.1 GENERALIZED ANXIETY DISORDER: ICD-10-CM

## 2019-08-19 DIAGNOSIS — F51.01 PRIMARY INSOMNIA: ICD-10-CM

## 2019-08-19 DIAGNOSIS — R07.9 CHEST PAIN, UNSPECIFIED TYPE: ICD-10-CM

## 2019-08-19 DIAGNOSIS — R41.3 MEMORY PROBLEM: ICD-10-CM

## 2019-08-19 PROCEDURE — 78452 HT MUSCLE IMAGE SPECT MULT: CPT

## 2019-08-19 PROCEDURE — 2580000003 HC RX 258: Performed by: FAMILY MEDICINE

## 2019-08-19 PROCEDURE — A9500 TC99M SESTAMIBI: HCPCS | Performed by: FAMILY MEDICINE

## 2019-08-19 PROCEDURE — 93017 CV STRESS TEST TRACING ONLY: CPT

## 2019-08-19 PROCEDURE — 6360000002 HC RX W HCPCS: Performed by: INTERNAL MEDICINE

## 2019-08-19 PROCEDURE — 3430000000 HC RX DIAGNOSTIC RADIOPHARMACEUTICAL: Performed by: FAMILY MEDICINE

## 2019-08-19 RX ORDER — ATROPINE SULFATE 0.1 MG/ML
0.5 INJECTION INTRAVENOUS EVERY 5 MIN PRN
Status: DISCONTINUED | OUTPATIENT
Start: 2019-08-19 | End: 2019-08-19

## 2019-08-19 RX ORDER — SODIUM CHLORIDE 9 MG/ML
500 INJECTION, SOLUTION INTRAVENOUS CONTINUOUS PRN
Status: DISCONTINUED | OUTPATIENT
Start: 2019-08-19 | End: 2019-08-19

## 2019-08-19 RX ORDER — ALBUTEROL SULFATE 90 UG/1
2 AEROSOL, METERED RESPIRATORY (INHALATION) PRN
Status: DISCONTINUED | OUTPATIENT
Start: 2019-08-19 | End: 2019-08-19

## 2019-08-19 RX ORDER — METOPROLOL TARTRATE 5 MG/5ML
5 INJECTION INTRAVENOUS EVERY 5 MIN PRN
Status: DISCONTINUED | OUTPATIENT
Start: 2019-08-19 | End: 2019-08-19

## 2019-08-19 RX ORDER — AMINOPHYLLINE DIHYDRATE 25 MG/ML
50 INJECTION, SOLUTION INTRAVENOUS PRN
Status: DISCONTINUED | OUTPATIENT
Start: 2019-08-19 | End: 2019-08-19

## 2019-08-19 RX ORDER — SODIUM CHLORIDE 0.9 % (FLUSH) 0.9 %
10 SYRINGE (ML) INJECTION PRN
Status: DISCONTINUED | OUTPATIENT
Start: 2019-08-19 | End: 2019-08-19

## 2019-08-19 RX ORDER — NITROGLYCERIN 0.4 MG/1
0.4 TABLET SUBLINGUAL EVERY 5 MIN PRN
Status: DISCONTINUED | OUTPATIENT
Start: 2019-08-19 | End: 2019-08-19

## 2019-08-19 RX ADMIN — TETRAKIS(2-METHOXYISOBUTYLISOCYANIDE)COPPER(I) TETRAFLUOROBORATE 35.7 MILLICURIE: 1 INJECTION, POWDER, LYOPHILIZED, FOR SOLUTION INTRAVENOUS at 09:29

## 2019-08-19 RX ADMIN — AMINOPHYLLINE 100 MG: 25 INJECTION, SOLUTION INTRAVENOUS at 09:33

## 2019-08-19 RX ADMIN — Medication 10 ML: at 07:01

## 2019-08-19 RX ADMIN — Medication 10 ML: at 09:07

## 2019-08-19 RX ADMIN — Medication 10 ML: at 09:28

## 2019-08-19 RX ADMIN — TETRAKIS(2-METHOXYISOBUTYLISOCYANIDE)COPPER(I) TETRAFLUOROBORATE 11 MILLICURIE: 1 INJECTION, POWDER, LYOPHILIZED, FOR SOLUTION INTRAVENOUS at 07:01

## 2019-08-19 RX ADMIN — REGADENOSON 0.4 MG: 0.08 INJECTION, SOLUTION INTRAVENOUS at 09:28

## 2019-08-20 ENCOUNTER — HOSPITAL ENCOUNTER (OUTPATIENT)
Age: 64
Discharge: HOME | End: 2019-08-20
Payer: MEDICARE

## 2019-08-20 DIAGNOSIS — F41.1 GENERALIZED ANXIETY DISORDER: ICD-10-CM

## 2019-08-20 DIAGNOSIS — E78.5 DYSLIPIDEMIA: ICD-10-CM

## 2019-08-20 DIAGNOSIS — R07.9 CHEST PAIN, UNSPECIFIED TYPE: ICD-10-CM

## 2019-08-20 DIAGNOSIS — R41.3 MEMORY PROBLEM: ICD-10-CM

## 2019-08-20 DIAGNOSIS — F51.01 PRIMARY INSOMNIA: ICD-10-CM

## 2019-08-20 LAB
ABSOLUTE EOS #: 0.1 K/UL (ref 0–0.4)
ABSOLUTE IMMATURE GRANULOCYTE: ABNORMAL K/UL (ref 0–0.3)
ABSOLUTE LYMPH #: 1.6 K/UL (ref 1–4.8)
ABSOLUTE MONO #: 0.5 K/UL (ref 0.1–1.3)
ALBUMIN SERPL-MCNC: 4.2 G/DL (ref 3.5–5.2)
ALBUMIN/GLOBULIN RATIO: ABNORMAL (ref 1–2.5)
ALP BLD-CCNC: 66 U/L (ref 35–104)
ALT SERPL-CCNC: 10 U/L (ref 5–33)
ANION GAP SERPL CALCULATED.3IONS-SCNC: 9 MMOL/L (ref 9–17)
AST SERPL-CCNC: 18 U/L
BASOPHILS # BLD: 1 % (ref 0–2)
BASOPHILS ABSOLUTE: 0.1 K/UL (ref 0–0.2)
BILIRUB SERPL-MCNC: 0.29 MG/DL (ref 0.3–1.2)
BUN BLDV-MCNC: 15 MG/DL (ref 8–23)
BUN/CREAT BLD: ABNORMAL (ref 9–20)
CALCIUM SERPL-MCNC: 10.1 MG/DL (ref 8.6–10.4)
CHLORIDE BLD-SCNC: 104 MMOL/L (ref 98–107)
CHOLESTEROL/HDL RATIO: 2.2
CHOLESTEROL: 199 MG/DL
CO2: 29 MMOL/L (ref 20–31)
CREAT SERPL-MCNC: 0.71 MG/DL (ref 0.5–0.9)
DIFFERENTIAL TYPE: ABNORMAL
EOSINOPHILS RELATIVE PERCENT: 3 % (ref 0–4)
ESTIMATED AVERAGE GLUCOSE: 117 MG/DL
GFR AFRICAN AMERICAN: >60 ML/MIN
GFR NON-AFRICAN AMERICAN: >60 ML/MIN
GFR SERPL CREATININE-BSD FRML MDRD: ABNORMAL ML/MIN/{1.73_M2}
GFR SERPL CREATININE-BSD FRML MDRD: ABNORMAL ML/MIN/{1.73_M2}
GLUCOSE BLD-MCNC: 100 MG/DL (ref 70–99)
HBA1C MFR BLD: 5.7 % (ref 4–6)
HCT VFR BLD CALC: 39.5 % (ref 36–46)
HDLC SERPL-MCNC: 90 MG/DL
HEMOGLOBIN: 13 G/DL (ref 12–16)
IMMATURE GRANULOCYTES: ABNORMAL %
LDL CHOLESTEROL: 89 MG/DL (ref 0–130)
LV EF: 60 %
LVEF MODALITY: NORMAL
LYMPHOCYTES # BLD: 27 % (ref 24–44)
MCH RBC QN AUTO: 31.3 PG (ref 26–34)
MCHC RBC AUTO-ENTMCNC: 32.8 G/DL (ref 31–37)
MCV RBC AUTO: 95.3 FL (ref 80–100)
MONOCYTES # BLD: 8 % (ref 1–7)
NRBC AUTOMATED: ABNORMAL PER 100 WBC
PDW BLD-RTO: 13.6 % (ref 11.5–14.9)
PLATELET # BLD: 268 K/UL (ref 150–450)
PLATELET ESTIMATE: ABNORMAL
PMV BLD AUTO: 8.6 FL (ref 6–12)
POTASSIUM SERPL-SCNC: 5.7 MMOL/L (ref 3.7–5.3)
RBC # BLD: 4.14 M/UL (ref 4–5.2)
RBC # BLD: ABNORMAL 10*6/UL
SEG NEUTROPHILS: 61 % (ref 36–66)
SEGMENTED NEUTROPHILS ABSOLUTE COUNT: 3.7 K/UL (ref 1.3–9.1)
SODIUM BLD-SCNC: 142 MMOL/L (ref 135–144)
TOTAL PROTEIN: 7.4 G/DL (ref 6.4–8.3)
TRIGL SERPL-MCNC: 100 MG/DL
TSH SERPL DL<=0.05 MIU/L-ACNC: 2.7 MIU/L (ref 0.3–5)
VLDLC SERPL CALC-MCNC: NORMAL MG/DL (ref 1–30)
WBC # BLD: 6 K/UL (ref 3.5–11)
WBC # BLD: ABNORMAL 10*3/UL

## 2019-08-20 PROCEDURE — 80053 COMPREHEN METABOLIC PANEL: CPT

## 2019-08-20 PROCEDURE — 80061 LIPID PANEL: CPT

## 2019-08-20 PROCEDURE — 85025 COMPLETE CBC W/AUTO DIFF WBC: CPT

## 2019-08-20 PROCEDURE — 84443 ASSAY THYROID STIM HORMONE: CPT

## 2019-08-20 PROCEDURE — 36415 COLL VENOUS BLD VENIPUNCTURE: CPT

## 2019-08-20 PROCEDURE — 83036 HEMOGLOBIN GLYCOSYLATED A1C: CPT

## 2019-08-22 ENCOUNTER — TELEPHONE (OUTPATIENT)
Dept: FAMILY MEDICINE CLINIC | Age: 64
End: 2019-08-22

## 2019-08-27 ENCOUNTER — OFFICE VISIT (OUTPATIENT)
Dept: FAMILY MEDICINE CLINIC | Age: 64
End: 2019-08-27
Payer: MEDICARE

## 2019-08-27 VITALS
HEIGHT: 67 IN | HEART RATE: 67 BPM | RESPIRATION RATE: 14 BRPM | DIASTOLIC BLOOD PRESSURE: 70 MMHG | SYSTOLIC BLOOD PRESSURE: 100 MMHG | WEIGHT: 188 LBS | OXYGEN SATURATION: 95 % | BODY MASS INDEX: 29.51 KG/M2

## 2019-08-27 DIAGNOSIS — Z00.00 ENCOUNTER FOR MEDICARE ANNUAL WELLNESS EXAM: Primary | ICD-10-CM

## 2019-08-27 PROCEDURE — G0438 PPPS, INITIAL VISIT: HCPCS | Performed by: FAMILY MEDICINE

## 2019-08-27 PROCEDURE — 3017F COLORECTAL CA SCREEN DOC REV: CPT | Performed by: FAMILY MEDICINE

## 2019-08-27 ASSESSMENT — ENCOUNTER SYMPTOMS
CHEST TIGHTNESS: 0
APNEA: 0
TROUBLE SWALLOWING: 0
SHORTNESS OF BREATH: 0
ABDOMINAL PAIN: 0
SINUS PRESSURE: 0
PHOTOPHOBIA: 0
COLOR CHANGE: 0
ANAL BLEEDING: 0
BACK PAIN: 0
VOMITING: 0
WHEEZING: 0
CONSTIPATION: 0
ABDOMINAL DISTENTION: 0
FACIAL SWELLING: 0
EYE PAIN: 0
SORE THROAT: 0
NAUSEA: 0
DIARRHEA: 0
EYE DISCHARGE: 0

## 2019-08-27 ASSESSMENT — PATIENT HEALTH QUESTIONNAIRE - PHQ9: SUM OF ALL RESPONSES TO PHQ QUESTIONS 1-9: 16

## 2019-08-27 ASSESSMENT — LIFESTYLE VARIABLES
HOW OFTEN DURING THE LAST YEAR HAVE YOU FAILED TO DO WHAT WAS NORMALLY EXPECTED FROM YOU BECAUSE OF DRINKING: 0
AUDIT TOTAL SCORE: 2
HAVE YOU OR SOMEONE ELSE BEEN INJURED AS A RESULT OF YOUR DRINKING: 0
HOW OFTEN DURING THE LAST YEAR HAVE YOU FOUND THAT YOU WERE NOT ABLE TO STOP DRINKING ONCE YOU HAD STARTED: 0
HOW OFTEN DO YOU HAVE SIX OR MORE DRINKS ON ONE OCCASION: 0
HOW OFTEN DO YOU HAVE A DRINK CONTAINING ALCOHOL: 2
HOW OFTEN DURING THE LAST YEAR HAVE YOU BEEN UNABLE TO REMEMBER WHAT HAPPENED THE NIGHT BEFORE BECAUSE YOU HAD BEEN DRINKING: 0
HAS A RELATIVE, FRIEND, DOCTOR, OR ANOTHER HEALTH PROFESSIONAL EXPRESSED CONCERN ABOUT YOUR DRINKING OR SUGGESTED YOU CUT DOWN: 0
HOW OFTEN DURING THE LAST YEAR HAVE YOU NEEDED AN ALCOHOLIC DRINK FIRST THING IN THE MORNING TO GET YOURSELF GOING AFTER A NIGHT OF HEAVY DRINKING: 0
AUDIT-C TOTAL SCORE: 2
HOW OFTEN DURING THE LAST YEAR HAVE YOU HAD A FEELING OF GUILT OR REMORSE AFTER DRINKING: 0
HOW MANY STANDARD DRINKS CONTAINING ALCOHOL DO YOU HAVE ON A TYPICAL DAY: 0

## 2019-08-27 NOTE — PROGRESS NOTES
Medicare Annual Wellness Visit  Name: Hemal Copeland Date: 2019   MRN: [id number] Sex: Female   Age: 59 y.o. Ethnicity: Non-/Non    : 1955 Race: Yony Dailey is here for Medicare AWV (annual)    Screenings for behavioral, psychosocial and functional/safety risks, and cognitive dysfunction are all negative except as indicated below. These results, as well as other patient data from the [address] form, are documented in Flowsheets linked to this Encounter. Allergies   Allergen Reactions    Ambien [Zolpidem Tartrate] Shortness Of Breath    Ativan [Lorazepam] Shortness Of Breath    Darvon [Propoxyphene] Other (See Comments)     Mental changes    Restoril [Temazepam]     Topamax [Topiramate]        Prior to Visit Medications    Medication Sig Taking? Authorizing Provider   pregabalin (LYRICA) 150 MG capsule Take 1 capsule by mouth 2 times daily for 34 days. Yes Sue Manley MD   simvastatin (ZOCOR) 40 MG tablet TAKE 1 TABLET BY MOUTH  EVERY NIGHT AT BEDTIME Yes Sue Manley MD   traZODone (DESYREL) 100 MG tablet TAKE 1 TABLET BY MOUTH AT BEDTIME Yes Sue Manley MD   FLUoxetine (PROZAC) 20 MG capsule TAKE 2 CAPSULES BY MOUTH IN THE MORNING Yes Sue Manley MD   aspirin (ASPIRIN CHILDRENS) 81 MG chewable tablet Take 1 tablet by mouth daily Yes Shawanda Rock MD   Biotin 800 MCG TABS Take 800 mcg by mouth daily. Yes Historical Provider, MD   calcium-vitamin D (OSCAL-500) 500-200 MG-UNIT per tablet Take 1 tablet by mouth 2 times daily. Yes Historical Provider, MD   oxyCODONE-acetaminophen (PERCOCET) 5-325 MG per tablet Take 1 tablet by mouth 2 times daily for 30 days.   Patient not taking: Reported on 2019  Sue Manley MD   memantine (NAMENDA) 10 MG tablet Take 10 mg twice a day for 2 weeks then one daily  Sue Manley MD   omeprazole (PRILOSEC) 40 MG delayed release capsule tablet 0    Biotin 800 MCG TABS Take 800 mcg by mouth daily.  calcium-vitamin D (OSCAL-500) 500-200 MG-UNIT per tablet Take 1 tablet by mouth 2 times daily.  oxyCODONE-acetaminophen (PERCOCET) 5-325 MG per tablet Take 1 tablet by mouth 2 times daily for 30 days. (Patient not taking: Reported on 8/27/2019) 60 tablet 0    memantine (NAMENDA) 10 MG tablet Take 10 mg twice a day for 2 weeks then one daily 58 tablet 2    omeprazole (PRILOSEC) 40 MG delayed release capsule Take 1 capsule by mouth daily for 30 doses 30 capsule 0     No current facility-administered medications for this visit. Allergies   Allergen Reactions    Ambien [Zolpidem Tartrate] Shortness Of Breath    Ativan [Lorazepam] Shortness Of Breath    Darvon [Propoxyphene] Other (See Comments)     Mental changes    Restoril [Temazepam]     Topamax [Topiramate]        Health Maintenance   Topic Date Due    Hepatitis C screen  1955    Pneumococcal 0-64 years Vaccine (1 of 1 - PPSV23) 02/22/1961    HIV screen  02/22/1970    DTaP/Tdap/Td vaccine (1 - Tdap) 02/22/1974    Cervical cancer screen  02/22/1976    Shingles Vaccine (1 of 2) 02/22/2005    Colon cancer screen colonoscopy  02/22/2005    Flu vaccine (1) 09/01/2019    A1C test (Diabetic or Prediabetic)  08/20/2020    Breast cancer screen  10/08/2020    Lipid screen  08/20/2024       Subjective:      Review of Systems   Constitutional: Negative for fatigue, fever and unexpected weight change. HENT: Negative for congestion, ear discharge, facial swelling, sinus pressure, sore throat and trouble swallowing. Eyes: Negative for photophobia, pain and discharge. Respiratory: Negative for apnea, chest tightness, shortness of breath and wheezing. Cardiovascular: Negative for chest pain and palpitations. Gastrointestinal: Negative for abdominal distention, abdominal pain, anal bleeding, constipation, diarrhea, nausea and vomiting.    Endocrine: Negative for cold current medical regimen is effective;  continue present plan and medications. Dietary/life style modifications along with immunization schedule  Discussed in details  Return in about 1 month (around 9/27/2019) for Medicare Annual Wellness Visit in 1 year. No orders of the defined types were placed in this encounter. Patient given educational materials - see patient instructions. Discussed use, benefit,and side effects of prescribed medications. All patient questions answered. Pt voiced understanding. Reviewed health maintenance. Instructed to continue current medications, diet and exercise. Patient agreed withtreatment plan. Follow up as directed.      Electronically signed by Anselmo Jaime MD on 8/27/2019 at 11:49 AM

## 2019-09-04 DIAGNOSIS — M51.26 DISPLACEMENT OF LUMBAR INTERVERTEBRAL DISC WITHOUT MYELOPATHY: ICD-10-CM

## 2019-09-04 DIAGNOSIS — S33.9XXS SPRAIN OF LIGAMENT OF LUMBOSACRAL JOINT, SEQUELA: ICD-10-CM

## 2019-09-04 DIAGNOSIS — M51.26 DISPLACEMENT OF INTERVERTEBRAL DISC OF LUMBAR REGION: ICD-10-CM

## 2019-09-04 DIAGNOSIS — S39.012S STRAIN OF LUMBAR REGION, SEQUELA: ICD-10-CM

## 2019-09-04 DIAGNOSIS — S33.8XXS SACRUM SPRAIN, SEQUELA: ICD-10-CM

## 2019-09-04 RX ORDER — TRAZODONE HYDROCHLORIDE 100 MG/1
TABLET ORAL
Qty: 30 TABLET | Refills: 0 | Status: SHIPPED | OUTPATIENT
Start: 2019-09-04 | End: 2019-10-11 | Stop reason: SDUPTHER

## 2019-09-13 ENCOUNTER — OFFICE VISIT (OUTPATIENT)
Dept: FAMILY MEDICINE CLINIC | Age: 64
End: 2019-09-13
Payer: COMMERCIAL

## 2019-09-13 VITALS
SYSTOLIC BLOOD PRESSURE: 104 MMHG | DIASTOLIC BLOOD PRESSURE: 62 MMHG | OXYGEN SATURATION: 97 % | HEIGHT: 67 IN | BODY MASS INDEX: 30.29 KG/M2 | HEART RATE: 60 BPM | WEIGHT: 193 LBS

## 2019-09-13 DIAGNOSIS — M51.26 DISPLACEMENT OF LUMBAR INTERVERTEBRAL DISC WITHOUT MYELOPATHY: Primary | ICD-10-CM

## 2019-09-13 DIAGNOSIS — S39.012S STRAIN OF LUMBAR REGION, SEQUELA: ICD-10-CM

## 2019-09-13 DIAGNOSIS — S33.8XXS SACRUM SPRAIN, SEQUELA: ICD-10-CM

## 2019-09-13 DIAGNOSIS — M51.26 DISPLACEMENT OF INTERVERTEBRAL DISC OF LUMBAR REGION: ICD-10-CM

## 2019-09-13 DIAGNOSIS — S33.9XXS SPRAIN OF LIGAMENT OF LUMBOSACRAL JOINT, SEQUELA: ICD-10-CM

## 2019-09-13 PROCEDURE — 99214 OFFICE O/P EST MOD 30 MIN: CPT | Performed by: FAMILY MEDICINE

## 2019-09-13 RX ORDER — PREGABALIN 150 MG/1
150 CAPSULE ORAL 2 TIMES DAILY
Qty: 60 CAPSULE | Refills: 0 | Status: SHIPPED | OUTPATIENT
Start: 2019-09-13 | End: 2019-10-11 | Stop reason: SDUPTHER

## 2019-09-13 RX ORDER — OXYCODONE HYDROCHLORIDE AND ACETAMINOPHEN 5; 325 MG/1; MG/1
1 TABLET ORAL 2 TIMES DAILY
Qty: 60 TABLET | Refills: 0 | Status: SHIPPED | OUTPATIENT
Start: 2019-09-13 | End: 2019-10-11 | Stop reason: SDUPTHER

## 2019-09-13 ASSESSMENT — ENCOUNTER SYMPTOMS
APNEA: 0
CHEST TIGHTNESS: 0
SHORTNESS OF BREATH: 0
NAUSEA: 0
ANAL BLEEDING: 0
VOMITING: 0
WHEEZING: 0
CONSTIPATION: 0
TROUBLE SWALLOWING: 0
ABDOMINAL PAIN: 0
EYE PAIN: 0
FACIAL SWELLING: 0
BACK PAIN: 1
SORE THROAT: 0
EYE DISCHARGE: 0
PHOTOPHOBIA: 0
DIARRHEA: 0
SINUS PRESSURE: 0
ABDOMINAL DISTENTION: 0
COLOR CHANGE: 0

## 2019-09-13 NOTE — PROGRESS NOTES
Conrado Michael MD, PhD Kayajenna Christi [address] [place] [address] [address] [place], [place] [address]Gavni is a 59 y.o. female who presents today for her medical conditions/complaints as noted below. Marielos Batista is c/o of   Chief Complaint   Patient presents with    Back Pain     [facility]         HPI:     Back Pain   This is a chronic problem. The current episode started more than 1 year ago. The problem has been waxing and waning since onset. The pain is present in the lumbar spine and sacro-iliac. The pain radiates to the right thigh. The pain is at a severity of 4/10. The pain is moderate. The pain is worse during the day. The symptoms are aggravated by bending, position, standing, sitting and twisting. Stiffness is present at night. Pertinent negatives include no abdominal pain, chest pain or fever. She has tried analgesics, bed rest, heat, chiropractic manipulation, home exercises, ice, muscle relaxant, NSAIDs and walking for the symptoms. The treatment provided mild relief.        Hemoglobin A1C (%)   Date Value   08/20/2019 5.7             ( goal A1C is < 7)   No results found for: LABMICR  LDL Cholesterol (mg/dL)   Date Value   08/20/2019 89   10/27/2014 91   10/16/2012 128 (H)       (goal LDL is <100)   AST (U/L)   Date Value   08/20/2019 18     ALT (U/L)   Date Value   08/20/2019 10     BUN (mg/dL)   Date Value   08/20/2019 15     BP Readings from Last 3 Encounters:   09/13/19 104/62   08/27/19 100/70   08/13/19 102/60          (goal 120/80)    Past Medical History:   Diagnosis Date    Arthritis     Depression     Displacement of intervertebral disc, site unspecified, without myelopathy     [facility]    Displacement of lumbar intervertebral disc without myelopathy     bwc    Hyperlipidemia     Lumbago     Bwc    Sprain of lumbar region     bwc    Sprain of lumbosacral (joint) (ligament)     BWC    Sprain of sacrum     B[facility]      Past Surgical History:   Procedure Mental changes    Restoril [Temazepam]     Topamax [Topiramate]        Health Maintenance   Topic Date Due    Hepatitis C screen  1955    Pneumococcal 0-64 years Vaccine (1 of 1 - PPSV23) 02/22/1961    HIV screen  02/22/1970    DTaP/Tdap/Td vaccine (1 - Tdap) 02/22/1974    Cervical cancer screen  02/22/1976    Shingles Vaccine (1 of 2) 02/22/2005    Colon cancer screen colonoscopy  02/22/2005    Flu vaccine (1) 09/01/2019    A1C test (Diabetic or Prediabetic)  08/20/2020    Breast cancer screen  10/08/2020    Lipid screen  08/20/2024       Subjective:      Review of Systems   Constitutional: Negative for fatigue, fever and unexpected weight change. HENT: Negative for congestion, ear discharge, facial swelling, sinus pressure, sore throat and trouble swallowing. Eyes: Negative for photophobia, pain and discharge. Respiratory: Negative for apnea, chest tightness, shortness of breath and wheezing. Cardiovascular: Negative for chest pain and palpitations. Gastrointestinal: Negative for abdominal distention, abdominal pain, anal bleeding, constipation, diarrhea, nausea and vomiting. Endocrine: Negative for cold intolerance, heat intolerance, polydipsia, polyphagia and polyuria. Genitourinary: Negative for difficulty urinating, flank pain, frequency and hematuria. Musculoskeletal: Positive for arthralgias, back pain and gait problem. Negative for neck pain. Skin: Negative for color change and rash. Neurological: Negative for dizziness, syncope, facial asymmetry, speech difficulty and light-headedness. Hematological: Negative for adenopathy. Psychiatric/Behavioral: Positive for sleep disturbance. Negative for agitation, behavioral problems, confusion, hallucinations and suicidal ideas. The patient is nervous/anxious. The patient is not hyperactive. Objective:     Physical Exam   Constitutional: She is oriented to person, place, and time. She appears well-developed.  No continue present plan and medications. Controlled Substance Monitoring:    Acute and Chronic Pain Monitoring:   RX Monitoring 6/10/2019   Attestation -   Periodic Controlled Substance Monitoring No signs of potential drug abuse or diversion identified. Return in about 2 months (around 11/13/2019), or [facility] f/u visit. No orders of the defined types were placed in this encounter. Patient given educational materials - see patient instructions. Discussed use, benefit,and side effects of prescribed medications. All patient questions answered. Pt voiced understanding. Reviewed health maintenance. Instructed to continue current medications, diet and exercise. Patient agreed withtreatment plan. Follow up as directed. Electronically signed by Alexia You MD on 9/13/2019 at 1:09 PMVisit Information    Have you changed or started any medications since your last visit including any over-the-counter medicines, vitamins, or herbal medicines? no   Are you having any side effects from any of your medications? -  no  Have you stopped taking any of your medications? Is so, why? -  no    Have you seen any other physician or provider since your last visit? No  Have you had any other diagnostic tests since your last visit? No  Have you been seen in the emergency room and/or had an admission to a hospital since we last saw you? No  Have you had your routine dental cleaning in the past 6 months? no    Have you activated your [employer] account? If not, what are your barriers?  Yes     Patient Care Team:  Alexia You MD as PCP - Karolina Quarles MD as PCP - [facility] Provider    Medical History Review  Past Medical, Family, and Social History reviewed and does not contribute to the patient presenting condition    Health Maintenance   Topic Date Due    Hepatitis C screen  1955    Pneumococcal 0-64 years Vaccine (1 of 1 - PPSV23) 02/22/1961    HIV screen

## 2019-09-16 ENCOUNTER — OFFICE VISIT (OUTPATIENT)
Dept: FAMILY MEDICINE CLINIC | Age: 64
End: 2019-09-16
Payer: MEDICARE

## 2019-09-16 VITALS — HEIGHT: 67 IN | BODY MASS INDEX: 30.13 KG/M2 | WEIGHT: 192 LBS

## 2019-09-16 DIAGNOSIS — M94.0 ACUTE COSTOCHONDRITIS: ICD-10-CM

## 2019-09-16 DIAGNOSIS — F51.01 PRIMARY INSOMNIA: ICD-10-CM

## 2019-09-16 DIAGNOSIS — F41.1 GENERALIZED ANXIETY DISORDER: ICD-10-CM

## 2019-09-16 DIAGNOSIS — G31.84 MINIMAL COGNITIVE IMPAIRMENT: ICD-10-CM

## 2019-09-16 DIAGNOSIS — R61 EXCESSIVE SWEATING: ICD-10-CM

## 2019-09-16 DIAGNOSIS — R42 ORTHOSTATIC DIZZINESS: ICD-10-CM

## 2019-09-16 DIAGNOSIS — R41.3 MEMORY PROBLEM: ICD-10-CM

## 2019-09-16 DIAGNOSIS — E66.9 OBESITY (BMI 30-39.9): ICD-10-CM

## 2019-09-16 DIAGNOSIS — R07.89 CHEST WALL PAIN: ICD-10-CM

## 2019-09-16 DIAGNOSIS — R42 DIZZINESS: ICD-10-CM

## 2019-09-16 DIAGNOSIS — E78.5 DYSLIPIDEMIA: Primary | ICD-10-CM

## 2019-09-16 DIAGNOSIS — J44.9 CHRONIC OBSTRUCTIVE PULMONARY DISEASE, UNSPECIFIED COPD TYPE (HCC): ICD-10-CM

## 2019-09-16 DIAGNOSIS — H81.10 BENIGN PAROXYSMAL POSITIONAL VERTIGO, UNSPECIFIED LATERALITY: ICD-10-CM

## 2019-09-16 PROCEDURE — G8926 SPIRO NO PERF OR DOC: HCPCS | Performed by: FAMILY MEDICINE

## 2019-09-16 PROCEDURE — 20553 NJX 1/MLT TRIGGER POINTS 3/>: CPT | Performed by: FAMILY MEDICINE

## 2019-09-16 PROCEDURE — 1036F TOBACCO NON-USER: CPT | Performed by: FAMILY MEDICINE

## 2019-09-16 PROCEDURE — G8427 DOCREV CUR MEDS BY ELIG CLIN: HCPCS | Performed by: FAMILY MEDICINE

## 2019-09-16 PROCEDURE — G8417 CALC BMI ABV UP PARAM F/U: HCPCS | Performed by: FAMILY MEDICINE

## 2019-09-16 PROCEDURE — 3017F COLORECTAL CA SCREEN DOC REV: CPT | Performed by: FAMILY MEDICINE

## 2019-09-16 PROCEDURE — 99214 OFFICE O/P EST MOD 30 MIN: CPT | Performed by: FAMILY MEDICINE

## 2019-09-16 PROCEDURE — 3023F SPIROM DOC REV: CPT | Performed by: FAMILY MEDICINE

## 2019-09-16 RX ORDER — METHYLPREDNISOLONE ACETATE 40 MG/ML
40 INJECTION, SUSPENSION INTRA-ARTICULAR; INTRALESIONAL; INTRAMUSCULAR; SOFT TISSUE ONCE
Qty: 1 ML | Refills: 0
Start: 2019-09-16 | End: 2019-09-16

## 2019-09-16 RX ORDER — PHENTERMINE HYDROCHLORIDE 37.5 MG/1
37.5 TABLET ORAL
Qty: 30 TABLET | Refills: 0 | Status: ON HOLD | OUTPATIENT
Start: 2019-09-16 | End: 2019-09-29 | Stop reason: HOSPADM

## 2019-09-16 RX ORDER — METHYLPREDNISOLONE ACETATE 40 MG/ML
40 INJECTION, SUSPENSION INTRA-ARTICULAR; INTRALESIONAL; INTRAMUSCULAR; SOFT TISSUE ONCE
Status: COMPLETED | OUTPATIENT
Start: 2019-09-16 | End: 2019-09-16

## 2019-09-16 RX ADMIN — METHYLPREDNISOLONE ACETATE 40 MG: 40 INJECTION, SUSPENSION INTRA-ARTICULAR; INTRALESIONAL; INTRAMUSCULAR; SOFT TISSUE at 14:30

## 2019-09-16 ASSESSMENT — ENCOUNTER SYMPTOMS
FACIAL SWELLING: 0
ABDOMINAL PAIN: 0
SINUS PRESSURE: 0
CHEST TIGHTNESS: 0
COUGH: 1
PHOTOPHOBIA: 0
COLOR CHANGE: 0
VOMITING: 0
BACK PAIN: 1
NAUSEA: 0
EYE DISCHARGE: 0
TROUBLE SWALLOWING: 0
WHEEZING: 0
SHORTNESS OF BREATH: 0
ANAL BLEEDING: 0
SORE THROAT: 0
EYE PAIN: 0
CONSTIPATION: 0
APNEA: 0
ABDOMINAL DISTENTION: 0
DIARRHEA: 0

## 2019-09-16 NOTE — PROGRESS NOTES
Breana Mendoza MD, PhD Marlene Cons [address] [facility] [address] [place], [place] [address]Gavin is a 59 y.o. female who presents today for her medical conditions/complaints as noted below. Dewayne Almeida is c/o of   Chief Complaint   Patient presents with    Injections     here for injection    Joint Pain    Cough    Anxiety         HPI:     Cough   This is a recurrent problem. The current episode started more than 1 month ago. The problem has been waxing and waning. The problem occurs every few hours. The cough is non-productive. Associated symptoms include chest pain (chest wall pain). Pertinent negatives include no fever, rash, sore throat, shortness of breath or wheezing. Chest Pain    This is a recurrent (chest wall pain) problem. The current episode started 1 to 4 weeks ago. The onset quality is gradual. The problem occurs constantly. The problem has been gradually worsening. The pain is at a severity of 3/10. The pain is moderate. The quality of the pain is described as dull, burning and tightness. The pain does not radiate. Associated symptoms include back pain, a cough, dizziness and numbness. Pertinent negatives include no abdominal pain, fever, nausea, palpitations, shortness of breath or vomiting.        Hemoglobin A1C (%)   Date Value   08/20/2019 5.7             ( goal A1C is < 7)   No results found for: LABMICR  LDL Cholesterol (mg/dL)   Date Value   08/20/2019 89   10/27/2014 91   10/16/2012 128 (H)       (goal LDL is <100)   AST (U/L)   Date Value   08/20/2019 18     ALT (U/L)   Date Value   08/20/2019 10     BUN (mg/dL)   Date Value   08/20/2019 15     BP Readings from Last 3 Encounters:   09/13/19 104/62   08/27/19 100/70   08/13/19 102/60          (goal 120/80)    Past Medical History:   Diagnosis Date    Arthritis     Depression     Displacement of intervertebral disc, site unspecified, without myelopathy     BWC    Displacement of pain.   Skin: Negative for color change and rash. Neurological: Positive for dizziness, light-headedness and numbness. Negative for syncope, facial asymmetry and speech difficulty. Hematological: Negative for adenopathy. Psychiatric/Behavioral: Positive for dysphoric mood and sleep disturbance. Negative for agitation, behavioral problems, confusion, hallucinations and suicidal ideas. The patient is nervous/anxious. The patient is not hyperactive. Objective:     Physical Exam   Constitutional: She is oriented to person, place, and time. She appears well-developed. No distress. HENT:   Head: Normocephalic. Neck: Normal range of motion. Neck supple. No thyromegaly present. Cardiovascular: Normal rate, regular rhythm and normal heart sounds. No murmur heard. Pulmonary/Chest: Breath sounds normal. She has no wheezes. She has no rales. She exhibits no tenderness. Abdominal: Soft. Bowel sounds are normal. She exhibits no distension and no mass. There is no tenderness. There is no rebound and no guarding. Musculoskeletal: Normal range of motion. She exhibits tenderness. She exhibits no edema. Lymphadenopathy:     She has no cervical adenopathy. Neurological: She is alert and oriented to person, place, and time. Skin: Skin is warm. No rash noted. Psychiatric: Her speech is normal and behavior is normal. Judgment and thought content normal. Her mood appears anxious. Her affect is labile. Cognition and memory are normal. She exhibits a depressed mood. Nursing note and vitals reviewed. Ht 5' 7.01\" (1.702 m)   Wt 192 lb (87.1 kg)   BMI 30.06 kg/m²     Assessment:       Diagnosis Orders   1. Dyslipidemia controlled    2. Dizziness ongoin Tilt Table Test   3. Orthostatic dizziness ongoing External Referral To Neurology    Tilt Table Test   4.  Acute costochondritis over right sternal border [address] - [place] INJECT TRIGGER POINTS, 3 OR GREATER    methylPREDNISolone acetate (DEPO-MEDROL) injection 40 mg   5. Obesity (BMI 30-39.9) slightly improving phentermine (ADIPEX-P) 37.5 MG tablet   6. Benign paroxysmal positional vertigo, unspecified laterality     7. Chest wall pain     8. Minimal cognitive impairment stable    9. Primary insomnia     10. Memory problem     11. Generalized anxiety disorder     12. Excessive sweating     13. Chronic obstructive pulmonary disease, unspecified COPD type ([address]      Trigger point multiple injections:    Right 2-3 , 4 and 5  6-7 costosternal junctionand xyphoid right areas     cleansed with alcohol, ethylen chloride spray applied, injection with Xylocaine and Depo-Medrol given with subcutaneous needle without complications. Pt tolerated well. Plan:     The current medical regimen is effective;  continue present plan and medications. Life style modifications  Fall precautions  Tilt table test  Neurology referral   Continue weight reduction program with Adipex P/diet/excercise     Return in about 1 month (around 10/16/2019) for follow up. Orders Placed This Encounter   Procedures    External Referral To Neurology     Referral Priority:   Routine     Referral Type:   Eval and Treat     Referral Reason:   Specialty Services Required     Referred to Provider:   Glenys Downing MD     Requested Specialty:   Neurology     Number of Visits Requested:   1    Tilt Table Test    [address] - [place] INJECT TRIGGER POINTS, 3 OR GREATER        Patient given educational materials - see patient instructions. Discussed use, benefit,and side effects of prescribed medications. All patient questions answered. Pt voiced understanding. Reviewed health maintenance. Instructed to continue current medications, diet and exercise. Patient agreed withtreatment plan. Follow up as directed.      Electronically signed by Julianne Mcdowell MD on 9/16/2019 at 2:51 PMVisit Information    Have you changed or started any medications since your last visit including any

## 2019-09-19 ENCOUNTER — TELEPHONE (OUTPATIENT)
Dept: FAMILY MEDICINE CLINIC | Age: 64
End: 2019-09-19

## 2019-09-19 NOTE — TELEPHONE ENCOUNTER
Pt states she tried to schedule Tilt table test (pt called [phone] )and was given a scheduling number but when she called she was told she needed a cardiologist in order to schedule testing.

## 2019-09-28 ENCOUNTER — HOSPITAL ENCOUNTER (OUTPATIENT)
Age: 64
Setting detail: OBSERVATION
Discharge: HOME | End: 2019-09-29
Attending: EMERGENCY MEDICINE | Admitting: INTERNAL MEDICINE
Payer: MEDICARE

## 2019-09-28 ENCOUNTER — APPOINTMENT (OUTPATIENT)
Dept: CT IMAGING | Age: 64
End: 2019-09-28
Payer: MEDICARE

## 2019-09-28 ENCOUNTER — APPOINTMENT (OUTPATIENT)
Dept: GENERAL RADIOLOGY | Age: 64
End: 2019-09-28
Payer: MEDICARE

## 2019-09-28 DIAGNOSIS — R42 DIZZINESS: Primary | ICD-10-CM

## 2019-09-28 LAB
ABSOLUTE EOS #: 0.1 K/UL (ref 0–0.4)
ABSOLUTE IMMATURE GRANULOCYTE: NORMAL K/UL (ref 0–0.3)
ABSOLUTE LYMPH #: 3.2 K/UL (ref 1–4.8)
ABSOLUTE MONO #: 0.6 K/UL (ref 0.1–1.3)
ANION GAP SERPL CALCULATED.3IONS-SCNC: 17 MMOL/L (ref 9–17)
BASOPHILS # BLD: 1 % (ref 0–2)
BASOPHILS ABSOLUTE: 0.1 K/UL (ref 0–0.2)
BILIRUBIN URINE: NEGATIVE
BUN BLDV-MCNC: 11 MG/DL (ref 8–23)
BUN/CREAT BLD: ABNORMAL (ref 9–20)
CALCIUM SERPL-MCNC: 10.2 MG/DL (ref 8.6–10.4)
CHLORIDE BLD-SCNC: 102 MMOL/L (ref 98–107)
CO2: 21 MMOL/L (ref 20–31)
COLOR: YELLOW
COMMENT UA: NORMAL
CREAT SERPL-MCNC: 0.79 MG/DL (ref 0.5–0.9)
DIFFERENTIAL TYPE: NORMAL
EOSINOPHILS RELATIVE PERCENT: 1 % (ref 0–4)
GFR AFRICAN AMERICAN: >60 ML/MIN
GFR NON-AFRICAN AMERICAN: >60 ML/MIN
GFR SERPL CREATININE-BSD FRML MDRD: ABNORMAL ML/MIN/{1.73_M2}
GFR SERPL CREATININE-BSD FRML MDRD: ABNORMAL ML/MIN/{1.73_M2}
GLUCOSE BLD-MCNC: 102 MG/DL (ref 70–99)
GLUCOSE URINE: NEGATIVE
HCT VFR BLD CALC: 42 % (ref 36–46)
HEMOGLOBIN: 14.1 G/DL (ref 12–16)
IMMATURE GRANULOCYTES: NORMAL %
KETONES, URINE: NEGATIVE
LACTIC ACID: 2.6 MMOL/L (ref 0.5–2.2)
LACTIC ACID: 4.3 MMOL/L (ref 0.5–2.2)
LEUKOCYTE ESTERASE, URINE: NEGATIVE
LYMPHOCYTES # BLD: 40 % (ref 24–44)
MAGNESIUM: 2 MG/DL (ref 1.6–2.6)
MCH RBC QN AUTO: 31.8 PG (ref 26–34)
MCHC RBC AUTO-ENTMCNC: 33.5 G/DL (ref 31–37)
MCV RBC AUTO: 94.9 FL (ref 80–100)
MONOCYTES # BLD: 7 % (ref 1–7)
NITRITE, URINE: NEGATIVE
NRBC AUTOMATED: NORMAL PER 100 WBC
PDW BLD-RTO: 13.6 % (ref 11.5–14.9)
PH UA: 8 (ref 5–8)
PLATELET # BLD: 294 K/UL (ref 150–450)
PLATELET ESTIMATE: NORMAL
PMV BLD AUTO: 8.7 FL (ref 6–12)
POTASSIUM SERPL-SCNC: 3.3 MMOL/L (ref 3.7–5.3)
PROTEIN UA: NEGATIVE
RBC # BLD: 4.43 M/UL (ref 4–5.2)
RBC # BLD: NORMAL 10*6/UL
SEG NEUTROPHILS: 51 % (ref 36–66)
SEGMENTED NEUTROPHILS ABSOLUTE COUNT: 4 K/UL (ref 1.3–9.1)
SODIUM BLD-SCNC: 140 MMOL/L (ref 135–144)
SPECIFIC GRAVITY UA: 1 (ref 1–1.03)
TROPONIN INTERP: NORMAL
TROPONIN INTERP: NORMAL
TROPONIN T: NORMAL NG/ML
TROPONIN T: NORMAL NG/ML
TROPONIN, HIGH SENSITIVITY: <6 NG/L (ref 0–14)
TROPONIN, HIGH SENSITIVITY: <6 NG/L (ref 0–14)
TURBIDITY: CLEAR
URINE HGB: NEGATIVE
UROBILINOGEN, URINE: NORMAL
WBC # BLD: 7.9 K/UL (ref 3.5–11)
WBC # BLD: NORMAL 10*3/UL

## 2019-09-28 PROCEDURE — G0378 HOSPITAL OBSERVATION PER HR: HCPCS

## 2019-09-28 PROCEDURE — 93005 ELECTROCARDIOGRAM TRACING: CPT | Performed by: STUDENT IN AN ORGANIZED HEALTH CARE EDUCATION/TRAINING PROGRAM

## 2019-09-28 PROCEDURE — 99285 EMERGENCY DEPT VISIT HI MDM: CPT

## 2019-09-28 PROCEDURE — 71045 X-RAY EXAM CHEST 1 VIEW: CPT

## 2019-09-28 PROCEDURE — 6370000000 HC RX 637 (ALT 250 FOR IP): Performed by: STUDENT IN AN ORGANIZED HEALTH CARE EDUCATION/TRAINING PROGRAM

## 2019-09-28 PROCEDURE — 85025 COMPLETE CBC W/AUTO DIFF WBC: CPT

## 2019-09-28 PROCEDURE — 70450 CT HEAD/BRAIN W/O DYE: CPT

## 2019-09-28 PROCEDURE — 80048 BASIC METABOLIC PNL TOTAL CA: CPT

## 2019-09-28 PROCEDURE — 81003 URINALYSIS AUTO W/O SCOPE: CPT

## 2019-09-28 PROCEDURE — 51701 INSERT BLADDER CATHETER: CPT

## 2019-09-28 PROCEDURE — 6370000000 HC RX 637 (ALT 250 FOR IP): Performed by: EMERGENCY MEDICINE

## 2019-09-28 PROCEDURE — 84484 ASSAY OF TROPONIN QUANT: CPT

## 2019-09-28 PROCEDURE — 2580000003 HC RX 258: Performed by: STUDENT IN AN ORGANIZED HEALTH CARE EDUCATION/TRAINING PROGRAM

## 2019-09-28 PROCEDURE — 83605 ASSAY OF LACTIC ACID: CPT

## 2019-09-28 PROCEDURE — 36415 COLL VENOUS BLD VENIPUNCTURE: CPT

## 2019-09-28 PROCEDURE — 83735 ASSAY OF MAGNESIUM: CPT

## 2019-09-28 PROCEDURE — 2580000003 HC RX 258: Performed by: EMERGENCY MEDICINE

## 2019-09-28 PROCEDURE — 51798 US URINE CAPACITY MEASURE: CPT

## 2019-09-28 PROCEDURE — 96374 THER/PROPH/DIAG INJ IV PUSH: CPT

## 2019-09-28 PROCEDURE — 6360000002 HC RX W HCPCS: Performed by: STUDENT IN AN ORGANIZED HEALTH CARE EDUCATION/TRAINING PROGRAM

## 2019-09-28 RX ORDER — ONDANSETRON 2 MG/ML
4 INJECTION INTRAMUSCULAR; INTRAVENOUS ONCE
Status: COMPLETED | OUTPATIENT
Start: 2019-09-28 | End: 2019-09-28

## 2019-09-28 RX ORDER — PANTOPRAZOLE SODIUM 40 MG/1
40 TABLET, DELAYED RELEASE ORAL
Status: DISCONTINUED | OUTPATIENT
Start: 2019-09-29 | End: 2019-09-29 | Stop reason: HOSPADM

## 2019-09-28 RX ORDER — 0.9 % SODIUM CHLORIDE 0.9 %
1000 INTRAVENOUS SOLUTION INTRAVENOUS ONCE
Status: COMPLETED | OUTPATIENT
Start: 2019-09-28 | End: 2019-09-28

## 2019-09-28 RX ORDER — OXYCODONE HYDROCHLORIDE AND ACETAMINOPHEN 5; 325 MG/1; MG/1
1 TABLET ORAL 2 TIMES DAILY
Status: DISCONTINUED | OUTPATIENT
Start: 2019-09-28 | End: 2019-09-29 | Stop reason: HOSPADM

## 2019-09-28 RX ORDER — SODIUM CHLORIDE 0.9 % (FLUSH) 0.9 %
10 SYRINGE (ML) INJECTION EVERY 12 HOURS SCHEDULED
Status: DISCONTINUED | OUTPATIENT
Start: 2019-09-28 | End: 2019-09-29 | Stop reason: HOSPADM

## 2019-09-28 RX ORDER — MEMANTINE HYDROCHLORIDE 10 MG/1
10 TABLET ORAL 2 TIMES DAILY
Status: DISCONTINUED | OUTPATIENT
Start: 2019-09-28 | End: 2019-09-29

## 2019-09-28 RX ORDER — ACETAMINOPHEN 325 MG/1
650 TABLET ORAL EVERY 4 HOURS PRN
Status: DISCONTINUED | OUTPATIENT
Start: 2019-09-28 | End: 2019-09-29 | Stop reason: HOSPADM

## 2019-09-28 RX ORDER — SODIUM CHLORIDE, SODIUM LACTATE, POTASSIUM CHLORIDE, CALCIUM CHLORIDE 600; 310; 30; 20 MG/100ML; MG/100ML; MG/100ML; MG/100ML
INJECTION, SOLUTION INTRAVENOUS CONTINUOUS
Status: DISCONTINUED | OUTPATIENT
Start: 2019-09-29 | End: 2019-09-29 | Stop reason: HOSPADM

## 2019-09-28 RX ORDER — DIAZEPAM 5 MG/1
5 TABLET ORAL ONCE
Status: COMPLETED | OUTPATIENT
Start: 2019-09-28 | End: 2019-09-28

## 2019-09-28 RX ORDER — POTASSIUM CHLORIDE 20 MEQ/1
40 TABLET, EXTENDED RELEASE ORAL ONCE
Status: DISCONTINUED | OUTPATIENT
Start: 2019-09-28 | End: 2019-09-28

## 2019-09-28 RX ORDER — PREGABALIN 150 MG/1
150 CAPSULE ORAL 2 TIMES DAILY
Status: DISCONTINUED | OUTPATIENT
Start: 2019-09-28 | End: 2019-09-29

## 2019-09-28 RX ORDER — MECLIZINE HYDROCHLORIDE 25 MG/1
25 TABLET ORAL ONCE
Status: COMPLETED | OUTPATIENT
Start: 2019-09-28 | End: 2019-09-28

## 2019-09-28 RX ORDER — ASPIRIN 81 MG/1
81 TABLET, CHEWABLE ORAL DAILY
Status: DISCONTINUED | OUTPATIENT
Start: 2019-09-29 | End: 2019-09-29 | Stop reason: HOSPADM

## 2019-09-28 RX ORDER — FLUOXETINE HYDROCHLORIDE 20 MG/1
40 CAPSULE ORAL DAILY
Status: DISCONTINUED | OUTPATIENT
Start: 2019-09-29 | End: 2019-09-29 | Stop reason: HOSPADM

## 2019-09-28 RX ORDER — PHENTERMINE HYDROCHLORIDE 37.5 MG/1
37.5 TABLET ORAL
Status: DISCONTINUED | OUTPATIENT
Start: 2019-09-29 | End: 2019-09-29 | Stop reason: HOSPADM

## 2019-09-28 RX ORDER — TRAZODONE HYDROCHLORIDE 50 MG/1
50 TABLET ORAL NIGHTLY PRN
Status: DISCONTINUED | OUTPATIENT
Start: 2019-09-28 | End: 2019-09-29

## 2019-09-28 RX ORDER — SODIUM CHLORIDE 0.9 % (FLUSH) 0.9 %
10 SYRINGE (ML) INJECTION PRN
Status: DISCONTINUED | OUTPATIENT
Start: 2019-09-28 | End: 2019-09-29 | Stop reason: HOSPADM

## 2019-09-28 RX ORDER — SODIUM CHLORIDE, SODIUM LACTATE, POTASSIUM CHLORIDE, AND CALCIUM CHLORIDE .6; .31; .03; .02 G/100ML; G/100ML; G/100ML; G/100ML
1000 INJECTION, SOLUTION INTRAVENOUS ONCE
Status: COMPLETED | OUTPATIENT
Start: 2019-09-29 | End: 2019-09-29

## 2019-09-28 RX ORDER — SIMVASTATIN 40 MG
40 TABLET ORAL NIGHTLY
Status: DISCONTINUED | OUTPATIENT
Start: 2019-09-28 | End: 2019-09-29 | Stop reason: HOSPADM

## 2019-09-28 RX ORDER — MECLIZINE HYDROCHLORIDE 25 MG/1
12.5 TABLET ORAL 2 TIMES DAILY
COMMUNITY
End: 2020-10-16

## 2019-09-28 RX ADMIN — ONDANSETRON 4 MG: 2 INJECTION INTRAMUSCULAR; INTRAVENOUS at 18:08

## 2019-09-28 RX ADMIN — DIAZEPAM 5 MG: 5 TABLET ORAL at 19:24

## 2019-09-28 RX ADMIN — PREGABALIN 150 MG: 150 CAPSULE ORAL at 23:56

## 2019-09-28 RX ADMIN — TRAZODONE HYDROCHLORIDE 50 MG: 50 TABLET ORAL at 23:56

## 2019-09-28 RX ADMIN — SODIUM CHLORIDE 1000 ML: 9 INJECTION, SOLUTION INTRAVENOUS at 19:23

## 2019-09-28 RX ADMIN — Medication 10 ML: at 22:58

## 2019-09-28 RX ADMIN — SODIUM CHLORIDE 1000 ML: 0.9 INJECTION, SOLUTION INTRAVENOUS at 17:22

## 2019-09-28 RX ADMIN — MECLIZINE HYDROCHLORIDE 25 MG: 25 TABLET ORAL at 18:38

## 2019-09-28 RX ADMIN — SODIUM CHLORIDE, POTASSIUM CHLORIDE, SODIUM LACTATE AND CALCIUM CHLORIDE 1000 ML: 600; 310; 30; 20 INJECTION, SOLUTION INTRAVENOUS at 23:57

## 2019-09-28 RX ADMIN — OXYCODONE HYDROCHLORIDE AND ACETAMINOPHEN 1 TABLET: 5; 325 TABLET ORAL at 23:56

## 2019-09-28 RX ADMIN — POTASSIUM BICARBONATE 60 MEQ: 782 TABLET, EFFERVESCENT ORAL at 18:34

## 2019-09-28 ASSESSMENT — ENCOUNTER SYMPTOMS
NAUSEA: 0
SHORTNESS OF BREATH: 0
WHEEZING: 0
ABDOMINAL PAIN: 0
EYE REDNESS: 0
COUGH: 0
BLOOD IN STOOL: 0
APNEA: 0
VOMITING: 0
EYE PAIN: 0
SINUS PAIN: 0
DIARRHEA: 0
RHINORRHEA: 0

## 2019-09-28 ASSESSMENT — PAIN DESCRIPTION - DESCRIPTORS: DESCRIPTORS: STABBING

## 2019-09-28 ASSESSMENT — PAIN DESCRIPTION - LOCATION: LOCATION: CHEST

## 2019-09-28 ASSESSMENT — PAIN SCALES - GENERAL
PAINLEVEL_OUTOF10: 7
PAINLEVEL_OUTOF10: 8

## 2019-09-28 ASSESSMENT — PAIN DESCRIPTION - FREQUENCY: FREQUENCY: CONTINUOUS

## 2019-09-28 ASSESSMENT — PAIN DESCRIPTION - PAIN TYPE: TYPE: ACUTE PAIN

## 2019-09-29 VITALS
BODY MASS INDEX: 28.79 KG/M2 | DIASTOLIC BLOOD PRESSURE: 50 MMHG | OXYGEN SATURATION: 97 % | TEMPERATURE: 98.1 F | HEIGHT: 67 IN | SYSTOLIC BLOOD PRESSURE: 125 MMHG | RESPIRATION RATE: 16 BRPM | WEIGHT: 183.42 LBS | HEART RATE: 69 BPM

## 2019-09-29 PROBLEM — M94.0 COSTOCHONDRITIS: Status: ACTIVE | Noted: 2019-09-29

## 2019-09-29 LAB
ANION GAP SERPL CALCULATED.3IONS-SCNC: 6 MMOL/L (ref 9–17)
BUN BLDV-MCNC: 10 MG/DL (ref 8–23)
BUN/CREAT BLD: ABNORMAL (ref 9–20)
CALCIUM SERPL-MCNC: 8.9 MG/DL (ref 8.6–10.4)
CHLORIDE BLD-SCNC: 106 MMOL/L (ref 98–107)
CO2: 28 MMOL/L (ref 20–31)
CREAT SERPL-MCNC: 0.67 MG/DL (ref 0.5–0.9)
GFR AFRICAN AMERICAN: >60 ML/MIN
GFR NON-AFRICAN AMERICAN: >60 ML/MIN
GFR SERPL CREATININE-BSD FRML MDRD: ABNORMAL ML/MIN/{1.73_M2}
GFR SERPL CREATININE-BSD FRML MDRD: ABNORMAL ML/MIN/{1.73_M2}
GLUCOSE BLD-MCNC: 79 MG/DL (ref 70–99)
HCT VFR BLD CALC: 35.5 % (ref 36–46)
HEMOGLOBIN: 11.6 G/DL (ref 12–16)
LACTIC ACID: 1 MMOL/L (ref 0.5–2.2)
MCH RBC QN AUTO: 31.4 PG (ref 26–34)
MCHC RBC AUTO-ENTMCNC: 32.6 G/DL (ref 31–37)
MCV RBC AUTO: 96.3 FL (ref 80–100)
NRBC AUTOMATED: ABNORMAL PER 100 WBC
PDW BLD-RTO: 13.6 % (ref 11.5–14.9)
PLATELET # BLD: 230 K/UL (ref 150–450)
PMV BLD AUTO: 8.9 FL (ref 6–12)
POTASSIUM SERPL-SCNC: 5.1 MMOL/L (ref 3.7–5.3)
RBC # BLD: 3.69 M/UL (ref 4–5.2)
SODIUM BLD-SCNC: 140 MMOL/L (ref 135–144)
WBC # BLD: 6.9 K/UL (ref 3.5–11)

## 2019-09-29 PROCEDURE — 36415 COLL VENOUS BLD VENIPUNCTURE: CPT

## 2019-09-29 PROCEDURE — 96372 THER/PROPH/DIAG INJ SC/IM: CPT

## 2019-09-29 PROCEDURE — 6370000000 HC RX 637 (ALT 250 FOR IP): Performed by: STUDENT IN AN ORGANIZED HEALTH CARE EDUCATION/TRAINING PROGRAM

## 2019-09-29 PROCEDURE — 2580000003 HC RX 258: Performed by: STUDENT IN AN ORGANIZED HEALTH CARE EDUCATION/TRAINING PROGRAM

## 2019-09-29 PROCEDURE — 99220 PR INITIAL OBSERVATION CARE/DAY 70 MINUTES: CPT | Performed by: PSYCHIATRY & NEUROLOGY

## 2019-09-29 PROCEDURE — 6360000002 HC RX W HCPCS: Performed by: PSYCHIATRY & NEUROLOGY

## 2019-09-29 PROCEDURE — G0378 HOSPITAL OBSERVATION PER HR: HCPCS

## 2019-09-29 PROCEDURE — 96375 TX/PRO/DX INJ NEW DRUG ADDON: CPT

## 2019-09-29 PROCEDURE — 80048 BASIC METABOLIC PNL TOTAL CA: CPT

## 2019-09-29 PROCEDURE — 83605 ASSAY OF LACTIC ACID: CPT

## 2019-09-29 PROCEDURE — 85027 COMPLETE CBC AUTOMATED: CPT

## 2019-09-29 PROCEDURE — 6360000002 HC RX W HCPCS: Performed by: STUDENT IN AN ORGANIZED HEALTH CARE EDUCATION/TRAINING PROGRAM

## 2019-09-29 PROCEDURE — 99220 PR INITIAL OBSERVATION CARE/DAY 70 MINUTES: CPT | Performed by: INTERNAL MEDICINE

## 2019-09-29 RX ORDER — KETOROLAC TROMETHAMINE 30 MG/ML
30 INJECTION, SOLUTION INTRAMUSCULAR; INTRAVENOUS EVERY 6 HOURS PRN
Status: DISCONTINUED | OUTPATIENT
Start: 2019-09-29 | End: 2019-09-29 | Stop reason: HOSPADM

## 2019-09-29 RX ORDER — POTASSIUM CHLORIDE 7.45 MG/ML
10 INJECTION INTRAVENOUS PRN
Status: DISCONTINUED | OUTPATIENT
Start: 2019-09-29 | End: 2019-09-29 | Stop reason: HOSPADM

## 2019-09-29 RX ORDER — POTASSIUM CHLORIDE 20 MEQ/1
40 TABLET, EXTENDED RELEASE ORAL PRN
Status: DISCONTINUED | OUTPATIENT
Start: 2019-09-29 | End: 2019-09-29 | Stop reason: HOSPADM

## 2019-09-29 RX ORDER — PREGABALIN 100 MG/1
100 CAPSULE ORAL 2 TIMES DAILY
Status: DISCONTINUED | OUTPATIENT
Start: 2019-09-29 | End: 2019-09-29 | Stop reason: HOSPADM

## 2019-09-29 RX ADMIN — ASPIRIN 81 MG 81 MG: 81 TABLET ORAL at 08:48

## 2019-09-29 RX ADMIN — PANTOPRAZOLE SODIUM 40 MG: 40 TABLET, DELAYED RELEASE ORAL at 08:50

## 2019-09-29 RX ADMIN — KETOROLAC TROMETHAMINE 30 MG: 30 INJECTION, SOLUTION INTRAMUSCULAR; INTRAVENOUS at 14:46

## 2019-09-29 RX ADMIN — PREGABALIN 150 MG: 150 CAPSULE ORAL at 08:45

## 2019-09-29 RX ADMIN — MEMANTINE HYDROCHLORIDE 10 MG: 10 TABLET ORAL at 08:48

## 2019-09-29 RX ADMIN — FLUOXETINE HYDROCHLORIDE 40 MG: 20 CAPSULE ORAL at 08:48

## 2019-09-29 RX ADMIN — SODIUM CHLORIDE, POTASSIUM CHLORIDE, SODIUM LACTATE AND CALCIUM CHLORIDE: 600; 310; 30; 20 INJECTION, SOLUTION INTRAVENOUS at 11:09

## 2019-09-29 RX ADMIN — ENOXAPARIN SODIUM 40 MG: 40 INJECTION SUBCUTANEOUS at 08:48

## 2019-09-29 RX ADMIN — OXYCODONE HYDROCHLORIDE AND ACETAMINOPHEN 1 TABLET: 5; 325 TABLET ORAL at 08:45

## 2019-09-29 RX ADMIN — SODIUM CHLORIDE, POTASSIUM CHLORIDE, SODIUM LACTATE AND CALCIUM CHLORIDE: 600; 310; 30; 20 INJECTION, SOLUTION INTRAVENOUS at 01:04

## 2019-09-29 ASSESSMENT — PAIN DESCRIPTION - PAIN TYPE: TYPE: ACUTE PAIN;INTRACTABLE PAIN

## 2019-09-29 ASSESSMENT — PAIN DESCRIPTION - DESCRIPTORS: DESCRIPTORS: HEADACHE

## 2019-09-29 ASSESSMENT — PAIN DESCRIPTION - FREQUENCY: FREQUENCY: CONTINUOUS

## 2019-09-29 ASSESSMENT — PAIN SCALES - GENERAL
PAINLEVEL_OUTOF10: 10
PAINLEVEL_OUTOF10: 7
PAINLEVEL_OUTOF10: 10

## 2019-09-29 ASSESSMENT — PAIN DESCRIPTION - LOCATION: LOCATION: HEAD

## 2019-10-01 LAB
EKG ATRIAL RATE: 70 BPM
EKG P AXIS: 2 DEGREES
EKG P-R INTERVAL: 160 MS
EKG Q-T INTERVAL: 422 MS
EKG QRS DURATION: 88 MS
EKG QTC CALCULATION (BAZETT): 455 MS
EKG R AXIS: 40 DEGREES
EKG T AXIS: 28 DEGREES
EKG VENTRICULAR RATE: 70 BPM

## 2019-10-01 PROCEDURE — 93010 ELECTROCARDIOGRAM REPORT: CPT | Performed by: INTERNAL MEDICINE

## 2019-10-11 ENCOUNTER — OFFICE VISIT (OUTPATIENT)
Dept: FAMILY MEDICINE CLINIC | Age: 64
End: 2019-10-11
Payer: COMMERCIAL

## 2019-10-11 VITALS
DIASTOLIC BLOOD PRESSURE: 71 MMHG | OXYGEN SATURATION: 97 % | HEART RATE: 61 BPM | SYSTOLIC BLOOD PRESSURE: 120 MMHG | WEIGHT: 188.4 LBS | BODY MASS INDEX: 29.51 KG/M2 | TEMPERATURE: 97.6 F

## 2019-10-11 DIAGNOSIS — S39.012S STRAIN OF LUMBAR REGION, SEQUELA: ICD-10-CM

## 2019-10-11 DIAGNOSIS — S33.8XXS SACRUM SPRAIN, SEQUELA: ICD-10-CM

## 2019-10-11 DIAGNOSIS — M51.26 DISPLACEMENT OF LUMBAR INTERVERTEBRAL DISC WITHOUT MYELOPATHY: ICD-10-CM

## 2019-10-11 DIAGNOSIS — F51.01 PRIMARY INSOMNIA: Primary | ICD-10-CM

## 2019-10-11 DIAGNOSIS — S33.9XXS SPRAIN OF LIGAMENT OF LUMBOSACRAL JOINT, SEQUELA: ICD-10-CM

## 2019-10-11 DIAGNOSIS — M51.26 DISPLACEMENT OF INTERVERTEBRAL DISC OF LUMBAR REGION: Primary | ICD-10-CM

## 2019-10-11 DIAGNOSIS — M51.26 DISPLACEMENT OF INTERVERTEBRAL DISC OF LUMBAR REGION: ICD-10-CM

## 2019-10-11 DIAGNOSIS — F41.1 GENERALIZED ANXIETY DISORDER: ICD-10-CM

## 2019-10-11 PROCEDURE — 99214 OFFICE O/P EST MOD 30 MIN: CPT | Performed by: FAMILY MEDICINE

## 2019-10-11 RX ORDER — FLUOXETINE HYDROCHLORIDE 20 MG/1
CAPSULE ORAL
Qty: 60 CAPSULE | Refills: 2 | Status: SHIPPED | OUTPATIENT
Start: 2019-10-11 | End: 2020-01-13

## 2019-10-11 RX ORDER — TRAZODONE HYDROCHLORIDE 100 MG/1
TABLET ORAL
Qty: 30 TABLET | Refills: 2 | Status: SHIPPED | OUTPATIENT
Start: 2019-10-11 | End: 2020-01-17 | Stop reason: SDUPTHER

## 2019-10-11 RX ORDER — OXYCODONE HYDROCHLORIDE AND ACETAMINOPHEN 5; 325 MG/1; MG/1
1 TABLET ORAL 2 TIMES DAILY
Qty: 60 TABLET | Refills: 0 | Status: SHIPPED | OUTPATIENT
Start: 2019-10-11 | End: 2019-11-18 | Stop reason: SDUPTHER

## 2019-10-11 RX ORDER — PREGABALIN 150 MG/1
150 CAPSULE ORAL 2 TIMES DAILY
Qty: 60 CAPSULE | Refills: 0 | Status: SHIPPED | OUTPATIENT
Start: 2019-10-11 | End: 2019-12-17 | Stop reason: SDUPTHER

## 2019-10-11 RX ORDER — MELOXICAM 15 MG/1
15 TABLET ORAL DAILY
Qty: 30 TABLET | Refills: 0 | Status: SHIPPED | OUTPATIENT
Start: 2019-10-11 | End: 2020-10-16

## 2019-10-11 ASSESSMENT — ENCOUNTER SYMPTOMS
COLOR CHANGE: 0
FACIAL SWELLING: 0
ABDOMINAL PAIN: 0
EYE PAIN: 0
APNEA: 0
EYE DISCHARGE: 0
CHEST TIGHTNESS: 0
ANAL BLEEDING: 0
SINUS PRESSURE: 0
TROUBLE SWALLOWING: 0
PHOTOPHOBIA: 0
NAUSEA: 0
SORE THROAT: 0
ABDOMINAL DISTENTION: 0
SHORTNESS OF BREATH: 0
DIARRHEA: 0
CONSTIPATION: 0
WHEEZING: 0
VOMITING: 0
BACK PAIN: 1

## 2019-10-16 ENCOUNTER — HOSPITAL ENCOUNTER (OUTPATIENT)
Dept: CARDIAC CATH/INVASIVE PROCEDURES | Age: 64
Discharge: HOME | End: 2019-10-16
Payer: MEDICARE

## 2019-10-16 PROCEDURE — 93660 TILT TABLE EVALUATION: CPT | Performed by: INTERNAL MEDICINE

## 2019-10-25 ENCOUNTER — OFFICE VISIT (OUTPATIENT)
Dept: FAMILY MEDICINE CLINIC | Age: 64
End: 2019-10-25
Payer: MEDICARE

## 2019-10-25 VITALS
SYSTOLIC BLOOD PRESSURE: 116 MMHG | HEART RATE: 84 BPM | DIASTOLIC BLOOD PRESSURE: 62 MMHG | BODY MASS INDEX: 29.66 KG/M2 | WEIGHT: 189 LBS | HEIGHT: 67 IN

## 2019-10-25 DIAGNOSIS — E66.9 OBESITY (BMI 30-39.9): ICD-10-CM

## 2019-10-25 DIAGNOSIS — J44.9 CHRONIC OBSTRUCTIVE PULMONARY DISEASE, UNSPECIFIED COPD TYPE (HCC): ICD-10-CM

## 2019-10-25 DIAGNOSIS — F41.1 GENERALIZED ANXIETY DISORDER: ICD-10-CM

## 2019-10-25 DIAGNOSIS — F51.01 PRIMARY INSOMNIA: ICD-10-CM

## 2019-10-25 DIAGNOSIS — H81.02 MENIERE'S DISEASE OF LEFT EAR: ICD-10-CM

## 2019-10-25 DIAGNOSIS — I95.1 ORTHOSTASIS: Primary | ICD-10-CM

## 2019-10-25 PROCEDURE — G8417 CALC BMI ABV UP PARAM F/U: HCPCS | Performed by: FAMILY MEDICINE

## 2019-10-25 PROCEDURE — 99214 OFFICE O/P EST MOD 30 MIN: CPT | Performed by: FAMILY MEDICINE

## 2019-10-25 PROCEDURE — 3017F COLORECTAL CA SCREEN DOC REV: CPT | Performed by: FAMILY MEDICINE

## 2019-10-25 PROCEDURE — 1036F TOBACCO NON-USER: CPT | Performed by: FAMILY MEDICINE

## 2019-10-25 PROCEDURE — G8427 DOCREV CUR MEDS BY ELIG CLIN: HCPCS | Performed by: FAMILY MEDICINE

## 2019-10-25 PROCEDURE — 3023F SPIROM DOC REV: CPT | Performed by: FAMILY MEDICINE

## 2019-10-25 PROCEDURE — G8926 SPIRO NO PERF OR DOC: HCPCS | Performed by: FAMILY MEDICINE

## 2019-10-25 PROCEDURE — G8484 FLU IMMUNIZE NO ADMIN: HCPCS | Performed by: FAMILY MEDICINE

## 2019-10-25 RX ORDER — PHENTERMINE HYDROCHLORIDE 37.5 MG/1
37.5 TABLET ORAL
Qty: 30 TABLET | Refills: 0 | Status: SHIPPED | OUTPATIENT
Start: 2019-10-25 | End: 2019-12-05 | Stop reason: SDUPTHER

## 2019-10-25 ASSESSMENT — ENCOUNTER SYMPTOMS
APNEA: 0
COLOR CHANGE: 0
EYE DISCHARGE: 0
BACK PAIN: 0
NAUSEA: 0
ANAL BLEEDING: 0
PHOTOPHOBIA: 0
CONSTIPATION: 0
WHEEZING: 0
TROUBLE SWALLOWING: 0
ABDOMINAL DISTENTION: 0
FACIAL SWELLING: 0
DIARRHEA: 0
ABDOMINAL PAIN: 0
SORE THROAT: 0
VOMITING: 0
SINUS PRESSURE: 0
SHORTNESS OF BREATH: 0
CHEST TIGHTNESS: 0
EYE PAIN: 0

## 2019-11-18 ENCOUNTER — OFFICE VISIT (OUTPATIENT)
Dept: FAMILY MEDICINE CLINIC | Age: 64
End: 2019-11-18
Payer: COMMERCIAL

## 2019-11-18 VITALS
BODY MASS INDEX: 28.56 KG/M2 | DIASTOLIC BLOOD PRESSURE: 65 MMHG | OXYGEN SATURATION: 90 % | WEIGHT: 182 LBS | HEIGHT: 67 IN | HEART RATE: 78 BPM | SYSTOLIC BLOOD PRESSURE: 116 MMHG | RESPIRATION RATE: 16 BRPM

## 2019-11-18 DIAGNOSIS — M51.26 DISPLACEMENT OF LUMBAR INTERVERTEBRAL DISC WITHOUT MYELOPATHY: Primary | ICD-10-CM

## 2019-11-18 DIAGNOSIS — S39.012S STRAIN OF LUMBAR REGION, SEQUELA: ICD-10-CM

## 2019-11-18 DIAGNOSIS — M51.26 DISPLACEMENT OF INTERVERTEBRAL DISC OF LUMBAR REGION: ICD-10-CM

## 2019-11-18 DIAGNOSIS — S33.8XXS SACRUM SPRAIN, SEQUELA: ICD-10-CM

## 2019-11-18 DIAGNOSIS — S33.9XXS SPRAIN OF LIGAMENT OF LUMBOSACRAL JOINT, SEQUELA: ICD-10-CM

## 2019-11-18 PROCEDURE — 99214 OFFICE O/P EST MOD 30 MIN: CPT | Performed by: FAMILY MEDICINE

## 2019-11-18 RX ORDER — OXYCODONE HYDROCHLORIDE AND ACETAMINOPHEN 5; 325 MG/1; MG/1
1 TABLET ORAL 2 TIMES DAILY
Qty: 60 TABLET | Refills: 0 | Status: SHIPPED | OUTPATIENT
Start: 2019-11-18 | End: 2019-12-17 | Stop reason: SDUPTHER

## 2019-11-18 ASSESSMENT — ENCOUNTER SYMPTOMS
ABDOMINAL DISTENTION: 0
DIARRHEA: 0
ABDOMINAL PAIN: 0
CHEST TIGHTNESS: 0
VOMITING: 0
FACIAL SWELLING: 0
WHEEZING: 0
CONSTIPATION: 0
SHORTNESS OF BREATH: 0
NAUSEA: 0
COLOR CHANGE: 0
BACK PAIN: 1
APNEA: 0
SORE THROAT: 0
EYE PAIN: 0
SINUS PRESSURE: 0
TROUBLE SWALLOWING: 0
EYE DISCHARGE: 0
ANAL BLEEDING: 0
PHOTOPHOBIA: 0

## 2019-11-20 ENCOUNTER — HOSPITAL ENCOUNTER (OUTPATIENT)
Dept: WOMENS IMAGING | Age: 64
Discharge: HOME | End: 2019-11-22
Payer: MEDICARE

## 2019-11-20 DIAGNOSIS — Z12.39 SCREENING BREAST EXAMINATION: ICD-10-CM

## 2019-11-20 PROCEDURE — 77063 BREAST TOMOSYNTHESIS BI: CPT

## 2019-12-05 DIAGNOSIS — E66.9 OBESITY (BMI 30-39.9): ICD-10-CM

## 2019-12-05 RX ORDER — PHENTERMINE HYDROCHLORIDE 37.5 MG/1
37.5 TABLET ORAL
Qty: 30 TABLET | Refills: 0 | Status: SHIPPED | OUTPATIENT
Start: 2019-12-05 | End: 2020-03-19 | Stop reason: SDUPTHER

## 2019-12-09 ENCOUNTER — TELEPHONE (OUTPATIENT)
Dept: FAMILY MEDICINE CLINIC | Age: 64
End: 2019-12-09

## 2019-12-09 DIAGNOSIS — R42 DIZZINESS: Primary | ICD-10-CM

## 2019-12-16 ENCOUNTER — TELEPHONE (OUTPATIENT)
Dept: FAMILY MEDICINE CLINIC | Age: 64
End: 2019-12-16

## 2019-12-16 DIAGNOSIS — E78.5 DYSLIPIDEMIA: ICD-10-CM

## 2019-12-16 RX ORDER — SIMVASTATIN 40 MG
TABLET ORAL
Qty: 90 TABLET | Refills: 2 | Status: ON HOLD | OUTPATIENT
Start: 2019-12-16 | End: 2020-10-17

## 2019-12-17 ENCOUNTER — OFFICE VISIT (OUTPATIENT)
Dept: FAMILY MEDICINE CLINIC | Age: 64
End: 2019-12-17
Payer: COMMERCIAL

## 2019-12-17 VITALS
WEIGHT: 189 LBS | OXYGEN SATURATION: 95 % | SYSTOLIC BLOOD PRESSURE: 102 MMHG | HEIGHT: 67 IN | DIASTOLIC BLOOD PRESSURE: 52 MMHG | BODY MASS INDEX: 29.66 KG/M2 | HEART RATE: 66 BPM

## 2019-12-17 DIAGNOSIS — S33.9XXS SPRAIN OF LIGAMENT OF LUMBOSACRAL JOINT, SEQUELA: ICD-10-CM

## 2019-12-17 DIAGNOSIS — S33.8XXS SACRUM SPRAIN, SEQUELA: ICD-10-CM

## 2019-12-17 DIAGNOSIS — M51.26 DISPLACEMENT OF INTERVERTEBRAL DISC OF LUMBAR REGION: Primary | ICD-10-CM

## 2019-12-17 DIAGNOSIS — M51.26 DISPLACEMENT OF LUMBAR INTERVERTEBRAL DISC WITHOUT MYELOPATHY: ICD-10-CM

## 2019-12-17 DIAGNOSIS — S39.012S STRAIN OF LUMBAR REGION, SEQUELA: ICD-10-CM

## 2019-12-17 PROCEDURE — 1036F TOBACCO NON-USER: CPT | Performed by: FAMILY MEDICINE

## 2019-12-17 PROCEDURE — G8484 FLU IMMUNIZE NO ADMIN: HCPCS | Performed by: FAMILY MEDICINE

## 2019-12-17 PROCEDURE — G8427 DOCREV CUR MEDS BY ELIG CLIN: HCPCS | Performed by: FAMILY MEDICINE

## 2019-12-17 PROCEDURE — 3017F COLORECTAL CA SCREEN DOC REV: CPT | Performed by: FAMILY MEDICINE

## 2019-12-17 PROCEDURE — G8417 CALC BMI ABV UP PARAM F/U: HCPCS | Performed by: FAMILY MEDICINE

## 2019-12-17 PROCEDURE — 99214 OFFICE O/P EST MOD 30 MIN: CPT | Performed by: FAMILY MEDICINE

## 2019-12-17 RX ORDER — OXYCODONE HYDROCHLORIDE AND ACETAMINOPHEN 5; 325 MG/1; MG/1
1 TABLET ORAL 2 TIMES DAILY
Qty: 60 TABLET | Refills: 0 | Status: SHIPPED | OUTPATIENT
Start: 2019-12-17 | End: 2020-01-17 | Stop reason: SDUPTHER

## 2019-12-17 RX ORDER — PREGABALIN 150 MG/1
150 CAPSULE ORAL 2 TIMES DAILY
Qty: 60 CAPSULE | Refills: 0 | Status: SHIPPED | OUTPATIENT
Start: 2019-12-17 | End: 2020-02-03

## 2019-12-17 ASSESSMENT — ENCOUNTER SYMPTOMS
ABDOMINAL DISTENTION: 0
BACK PAIN: 1
PHOTOPHOBIA: 0
CHEST TIGHTNESS: 0
SHORTNESS OF BREATH: 0
SORE THROAT: 0
FACIAL SWELLING: 0
VOMITING: 0
COLOR CHANGE: 0
ANAL BLEEDING: 0
EYE DISCHARGE: 0
ABDOMINAL PAIN: 0
SINUS PRESSURE: 0
EYE PAIN: 0
CONSTIPATION: 0
APNEA: 0
TROUBLE SWALLOWING: 0
NAUSEA: 0
WHEEZING: 0
DIARRHEA: 0

## 2019-12-27 ENCOUNTER — TELEPHONE (OUTPATIENT)
Dept: FAMILY MEDICINE CLINIC | Age: 64
End: 2019-12-27

## 2019-12-27 DIAGNOSIS — H81.02 MENIERE'S DISEASE OF LEFT EAR: Primary | ICD-10-CM

## 2020-01-13 RX ORDER — FLUOXETINE HYDROCHLORIDE 20 MG/1
CAPSULE ORAL
Qty: 60 CAPSULE | Refills: 0 | Status: SHIPPED | OUTPATIENT
Start: 2020-01-13 | End: 2020-02-11

## 2020-01-17 ENCOUNTER — OFFICE VISIT (OUTPATIENT)
Dept: FAMILY MEDICINE CLINIC | Age: 65
End: 2020-01-17
Payer: COMMERCIAL

## 2020-01-17 VITALS
HEART RATE: 72 BPM | WEIGHT: 191 LBS | SYSTOLIC BLOOD PRESSURE: 89 MMHG | HEIGHT: 67 IN | BODY MASS INDEX: 29.98 KG/M2 | DIASTOLIC BLOOD PRESSURE: 54 MMHG

## 2020-01-17 PROCEDURE — G8417 CALC BMI ABV UP PARAM F/U: HCPCS | Performed by: FAMILY MEDICINE

## 2020-01-17 PROCEDURE — 3017F COLORECTAL CA SCREEN DOC REV: CPT | Performed by: FAMILY MEDICINE

## 2020-01-17 PROCEDURE — 99214 OFFICE O/P EST MOD 30 MIN: CPT | Performed by: FAMILY MEDICINE

## 2020-01-17 PROCEDURE — G8427 DOCREV CUR MEDS BY ELIG CLIN: HCPCS | Performed by: FAMILY MEDICINE

## 2020-01-17 PROCEDURE — G8484 FLU IMMUNIZE NO ADMIN: HCPCS | Performed by: FAMILY MEDICINE

## 2020-01-17 PROCEDURE — 1036F TOBACCO NON-USER: CPT | Performed by: FAMILY MEDICINE

## 2020-01-17 RX ORDER — TRAZODONE HYDROCHLORIDE 100 MG/1
TABLET ORAL
Qty: 30 TABLET | Refills: 0 | Status: SHIPPED | OUTPATIENT
Start: 2020-01-17 | End: 2020-02-11

## 2020-01-17 RX ORDER — OXYCODONE HYDROCHLORIDE AND ACETAMINOPHEN 5; 325 MG/1; MG/1
1 TABLET ORAL 2 TIMES DAILY
Qty: 60 TABLET | Refills: 0 | Status: SHIPPED | OUTPATIENT
Start: 2020-01-17 | End: 2020-02-17 | Stop reason: SDUPTHER

## 2020-01-17 ASSESSMENT — ENCOUNTER SYMPTOMS
SHORTNESS OF BREATH: 0
NAUSEA: 0
TROUBLE SWALLOWING: 0
VOMITING: 0
SINUS PRESSURE: 0
ANAL BLEEDING: 0
EYE PAIN: 0
CHEST TIGHTNESS: 0
ABDOMINAL DISTENTION: 0
WHEEZING: 0
ABDOMINAL PAIN: 0
FACIAL SWELLING: 0
APNEA: 0
BACK PAIN: 1
PHOTOPHOBIA: 0
COLOR CHANGE: 0
DIARRHEA: 0
EYE DISCHARGE: 0
SORE THROAT: 0
CONSTIPATION: 0

## 2020-01-17 ASSESSMENT — PATIENT HEALTH QUESTIONNAIRE - PHQ9
2. FEELING DOWN, DEPRESSED OR HOPELESS: 0
SUM OF ALL RESPONSES TO PHQ QUESTIONS 1-9: 0
SUM OF ALL RESPONSES TO PHQ9 QUESTIONS 1 & 2: 0
1. LITTLE INTEREST OR PLEASURE IN DOING THINGS: 0
SUM OF ALL RESPONSES TO PHQ QUESTIONS 1-9: 0

## 2020-01-17 NOTE — PROGRESS NOTES
Mauro Saucedo MD, PhD Lokesh Shellie [address] [place], [place] [address]Gavin is a 59 y.o. female who presents today for her medical conditions/complaints as noted below. Sindyvin Ferreira is c/o of   Chief Complaint   Patient presents with    Back Pain     [facility]         HPI:     Back Pain   This is a chronic problem. The current episode started more than 1 year ago. The problem has been waxing and waning since onset. The pain is present in the sacro-iliac and lumbar spine. The quality of the pain is described as burning, aching, cramping and shooting. The pain radiates to the right foot, right knee, right thigh and left thigh. The pain is at a severity of 4/10. The pain is moderate. The pain is worse during the day. The symptoms are aggravated by bending, position, standing and twisting. Stiffness is present at night and in the morning. Pertinent negatives include no abdominal pain, chest pain or fever.        Hemoglobin A1C (%)   Date Value   08/20/2019 5.7             ( goal A1C is < 7)   No results found for: LABMICR  LDL Cholesterol (mg/dL)   Date Value   08/20/2019 89   10/27/2014 91   10/16/2012 128 (H)       (goal LDL is <100)   AST (U/L)   Date Value   08/20/2019 18     ALT (U/L)   Date Value   08/20/2019 10     BUN (mg/dL)   Date Value   09/29/2019 10     BP Readings from Last 3 Encounters:   01/17/20 (!) 89/54   12/17/19 (!) 102/52   11/18/19 116/65          (goal 120/80)    Past Medical History:   Diagnosis Date    Arthritis     Depression     Displacement of intervertebral disc, site unspecified, without myelopathy     BWC    Displacement of lumbar intervertebral disc without myelopathy     bwc    Hyperlipidemia     Lumbago     Bwc    Sprain of lumbar region     bwc    Sprain of lumbosacral (joint) (ligament)     BWC    Sprain of sacrum     BWC      Past Surgical History:   Procedure Laterality Date    ABDOMINAL EXPLORATION SURGERY      BACK SURGERY      X2    COLON SURGERY      COLONOSCOPY      DILATION AND CURETTAGE OF UTERUS      FOOT SURGERY Right     HAND SURGERY Right 14    OVARY REMOVAL Bilateral     WRIST ARTHROPLASTY Left        Family History   Problem Relation Age of Onset    Heart Disease Father     High Blood Pressure Father        Social History     Tobacco Use    Smoking status: Former Smoker     Packs/day: 0.25     Years: 15.00     Pack years: 3.75     Types: Cigarettes     Last attempt to quit: 2019     Years since quittin.3    Smokeless tobacco: Never Used    Tobacco comment: quit 32 years ago   Substance Use Topics    Alcohol use: Yes     Alcohol/week: 2.0 standard drinks     Types: 2 Cans of beer per week     Comment: OCCASSION      Current Outpatient Medications   Medication Sig Dispense Refill    oxyCODONE-acetaminophen (PERCOCET) 5-325 MG per tablet Take 1 tablet by mouth 2 times daily for 30 days. 60 tablet 0    traZODone (DESYREL) 100 MG tablet TAKE 1 TABLET BY MOUTH ONCE DAILY AT BEDTIME 30 tablet 0    FLUoxetine (PROZAC) 20 MG capsule TAKE 2 CAPSULES BY MOUTH IN THE MORNING 60 capsule 0    pregabalin (LYRICA) 150 MG capsule Take 1 capsule by mouth 2 times daily for 30 days. 60 capsule 0    simvastatin (ZOCOR) 40 MG tablet TAKE 1 TABLET BY MOUTH  EVERY NIGHT AT BEDTIME 90 tablet 2    Calcium Carbonate-Vitamin D (OSCAL 500/200 D-3 PO) Take 500 mg by mouth daily      meclizine (ANTIVERT) 25 MG tablet Take 12.5 mg by mouth 2 times daily      aspirin (ASPIRIN CHILDRENS) 81 MG chewable tablet Take 1 tablet by mouth daily 30 tablet 0    meloxicam (MOBIC) 15 MG tablet Take 1 tablet by mouth daily 30 tablet 0    omeprazole (PRILOSEC) 40 MG delayed release capsule Take 1 capsule by mouth daily for 30 doses 30 capsule 0     No current facility-administered medications for this visit.       Allergies   Allergen Reactions    Ambien [Zolpidem Tartrate] Shortness Of Breath    Ativan [Lorazepam] Shortness Of Breath    Darvon [Propoxyphene] Other (See Comments)     Mental changes    Restoril [Temazepam]     Topamax [Topiramate]        Health Maintenance   Topic Date Due    Hepatitis C screen  1955    Pneumococcal 0-64 years Vaccine (1 of 1 - PPSV23) 02/22/1961    DTaP/Tdap/Td vaccine (1 - Tdap) 02/22/1966    HIV screen  02/22/1970    Cervical cancer screen  02/22/1976    Shingles Vaccine (1 of 2) 02/22/2005    Colon cancer screen colonoscopy  02/22/2005    A1C test (Diabetic or Prediabetic)  08/20/2020    Lipid screen  08/20/2020    Breast cancer screen  11/20/2021    Flu vaccine  Completed       Subjective:      Review of Systems   Constitutional: Negative for fatigue, fever and unexpected weight change. HENT: Negative for congestion, ear discharge, facial swelling, sinus pressure, sore throat and trouble swallowing. Eyes: Negative for photophobia, pain and discharge. Respiratory: Negative for apnea, chest tightness, shortness of breath and wheezing. Cardiovascular: Negative for chest pain and palpitations. Gastrointestinal: Negative for abdominal distention, abdominal pain, anal bleeding, constipation, diarrhea, nausea and vomiting. Endocrine: Negative for cold intolerance, heat intolerance, polydipsia, polyphagia and polyuria. Genitourinary: Negative for difficulty urinating, flank pain, frequency and hematuria. Musculoskeletal: Positive for arthralgias and back pain. Negative for gait problem and neck pain. Skin: Negative for color change and rash. Neurological: Negative for dizziness, syncope, facial asymmetry, speech difficulty and light-headedness. Hematological: Negative for adenopathy. Psychiatric/Behavioral: Positive for dysphoric mood and sleep disturbance. Negative for agitation, behavioral problems, confusion, hallucinations and suicidal ideas. The patient is nervous/anxious. The patient is not hyperactive.         Objective:     Physical oxyCODONE-acetaminophen (PERCOCET) 5-325 MG per tablet    traZODone (DESYREL) 100 MG tablet   4. Sacrum sprain, sequela  oxyCODONE-acetaminophen (PERCOCET) 5-325 MG per tablet    traZODone (DESYREL) 100 MG tablet   5. Sprain of ligament of lumbosacral joint, sequela  oxyCODONE-acetaminophen (PERCOCET) 5-325 MG per tablet    traZODone (DESYREL) 100 MG tablet                 Plan:    Avoid heavy lifting  The current medical regimen is effective;  continue present plan and medications. Controlled Substance Monitoring:    Acute and Chronic Pain Monitoring:   RX Monitoring 1/17/2020   Attestation -   Periodic Controlled Substance Monitoring No signs of potential drug abuse or diversion identified. Rx Percocet and Desyrel both prn    Return in about 2 months (around 3/17/2020) for follow up. No orders of the defined types were placed in this encounter. Patient given educational materials - see patient instructions. Discussed use, benefit,and side effects of prescribed medications. All patient questions answered. Pt voiced understanding. Reviewed health maintenance. Instructed to continue current medications, diet and exercise. Patient agreed withtreatment plan. Follow up as directed.      Electronically signed by Janes Alanis MD on 1/17/2020 at 11:53 AM

## 2020-02-03 RX ORDER — PREGABALIN 150 MG/1
CAPSULE ORAL
Qty: 60 CAPSULE | Refills: 0 | Status: SHIPPED | OUTPATIENT
Start: 2020-02-03 | End: 2020-03-10

## 2020-02-03 NOTE — TELEPHONE ENCOUNTER
Kristy Tejeda is calling to request a refill on the following medication(s):  Requested Prescriptions     Pending Prescriptions Disp Refills    pregabalin (LYRICA) 150 MG capsule [Pharmacy Med Name: Pregabalin 150 MG Oral Capsule] 60 capsule 0     Sig: TAKE 1 CAPSULE BY MOUTH TWICE DAILY FOR 30 DAYS       Last Visit Date (If Applicable):  7/12/0345    Next Visit Date:    2/17/2020

## 2020-02-11 RX ORDER — FLUOXETINE HYDROCHLORIDE 20 MG/1
CAPSULE ORAL
Qty: 60 CAPSULE | Refills: 0 | Status: SHIPPED | OUTPATIENT
Start: 2020-02-11 | End: 2020-03-10

## 2020-02-11 RX ORDER — TRAZODONE HYDROCHLORIDE 100 MG/1
TABLET ORAL
Qty: 30 TABLET | Refills: 0 | Status: SHIPPED | OUTPATIENT
Start: 2020-02-11 | End: 2020-03-10

## 2020-02-17 ENCOUNTER — OFFICE VISIT (OUTPATIENT)
Dept: FAMILY MEDICINE CLINIC | Age: 65
End: 2020-02-17
Payer: COMMERCIAL

## 2020-02-17 VITALS — HEART RATE: 93 BPM | OXYGEN SATURATION: 100 % | SYSTOLIC BLOOD PRESSURE: 95 MMHG | DIASTOLIC BLOOD PRESSURE: 47 MMHG

## 2020-02-17 PROCEDURE — 1036F TOBACCO NON-USER: CPT | Performed by: FAMILY MEDICINE

## 2020-02-17 PROCEDURE — G8428 CUR MEDS NOT DOCUMENT: HCPCS | Performed by: FAMILY MEDICINE

## 2020-02-17 PROCEDURE — G8417 CALC BMI ABV UP PARAM F/U: HCPCS | Performed by: FAMILY MEDICINE

## 2020-02-17 PROCEDURE — 3017F COLORECTAL CA SCREEN DOC REV: CPT | Performed by: FAMILY MEDICINE

## 2020-02-17 PROCEDURE — 99214 OFFICE O/P EST MOD 30 MIN: CPT | Performed by: FAMILY MEDICINE

## 2020-02-17 PROCEDURE — G8484 FLU IMMUNIZE NO ADMIN: HCPCS | Performed by: FAMILY MEDICINE

## 2020-02-17 RX ORDER — OXYCODONE HYDROCHLORIDE AND ACETAMINOPHEN 5; 325 MG/1; MG/1
1 TABLET ORAL 2 TIMES DAILY
Qty: 60 TABLET | Refills: 0 | Status: SHIPPED | OUTPATIENT
Start: 2020-02-17 | End: 2020-03-18 | Stop reason: SDUPTHER

## 2020-02-17 ASSESSMENT — ENCOUNTER SYMPTOMS
BACK PAIN: 1
CHEST TIGHTNESS: 0
VOMITING: 0
FACIAL SWELLING: 0
CONSTIPATION: 0
COLOR CHANGE: 0
ABDOMINAL PAIN: 0
SINUS PRESSURE: 0
PHOTOPHOBIA: 0
WHEEZING: 0
TROUBLE SWALLOWING: 0
SORE THROAT: 0
EYE DISCHARGE: 0
ANAL BLEEDING: 0
DIARRHEA: 0
SHORTNESS OF BREATH: 0
APNEA: 0
EYE PAIN: 0
ABDOMINAL DISTENTION: 0
NAUSEA: 0

## 2020-02-18 NOTE — PROGRESS NOTES
Right 14    OVARY REMOVAL Bilateral     WRIST ARTHROPLASTY Left        Family History   Problem Relation Age of Onset    Heart Disease Father     High Blood Pressure Father        Social History     Tobacco Use    Smoking status: Former Smoker     Packs/day: 0.25     Years: 15.00     Pack years: 3.75     Types: Cigarettes     Last attempt to quit: 2019     Years since quittin.4    Smokeless tobacco: Never Used    Tobacco comment: quit 32 years ago   Substance Use Topics    Alcohol use: Yes     Alcohol/week: 2.0 standard drinks     Types: 2 Cans of beer per week     Comment: OCCASSION      Current Outpatient Medications   Medication Sig Dispense Refill    oxyCODONE-acetaminophen (PERCOCET) 5-325 MG per tablet Take 1 tablet by mouth 2 times daily for 30 days. 60 tablet 0    FLUoxetine (PROZAC) 20 MG capsule TAKE 2 CAPSULES BY MOUTH IN THE MORNING 60 capsule 0    traZODone (DESYREL) 100 MG tablet TAKE 1 TABLET BY MOUTH ONCE DAILY AT BEDTIME 30 tablet 0    pregabalin (LYRICA) 150 MG capsule TAKE 1 CAPSULE BY MOUTH TWICE DAILY FOR 30 DAYS 60 capsule 0    simvastatin (ZOCOR) 40 MG tablet TAKE 1 TABLET BY MOUTH  EVERY NIGHT AT BEDTIME 90 tablet 2    Calcium Carbonate-Vitamin D (OSCAL 500/200 D-3 PO) Take 500 mg by mouth daily      meclizine (ANTIVERT) 25 MG tablet Take 12.5 mg by mouth 2 times daily      aspirin (ASPIRIN CHILDRENS) 81 MG chewable tablet Take 1 tablet by mouth daily 30 tablet 0    meloxicam (MOBIC) 15 MG tablet Take 1 tablet by mouth daily 30 tablet 0    omeprazole (PRILOSEC) 40 MG delayed release capsule Take 1 capsule by mouth daily for 30 doses 30 capsule 0     No current facility-administered medications for this visit.       Allergies   Allergen Reactions    Ambien [Zolpidem Tartrate] Shortness Of Breath    Ativan [Lorazepam] Shortness Of Breath    Darvon [Propoxyphene] Other (See Comments)     Mental changes    Restoril [Temazepam]     Topamax [Topiramate] signs and nursing note reviewed. Constitutional:       General: She is not in acute distress. Appearance: She is well-developed. HENT:      Head: Normocephalic. Neck:      Musculoskeletal: Normal range of motion and neck supple. Thyroid: No thyromegaly. Cardiovascular:      Rate and Rhythm: Normal rate and regular rhythm. Heart sounds: Normal heart sounds. No murmur. Pulmonary:      Breath sounds: Normal breath sounds. No wheezing or rales. Chest:      Chest wall: No tenderness. Abdominal:      General: Bowel sounds are normal. There is no distension. Palpations: Abdomen is soft. There is no mass. Tenderness: There is no abdominal tenderness. There is no guarding or rebound. Musculoskeletal:         General: Tenderness present. Lumbar back: She exhibits decreased range of motion and tenderness. Back:    Lymphadenopathy:      Cervical: No cervical adenopathy. Skin:     General: Skin is warm. Findings: No rash. Neurological:      Mental Status: She is alert and oriented to person, place, and time. Psychiatric:         Attention and Perception: Attention and perception normal.         Mood and Affect: Mood is depressed. Affect is labile. Speech: Speech normal.         Behavior: Behavior normal. Behavior is cooperative. Thought Content: Thought content normal.         Cognition and Memory: Cognition and memory normal.         Judgment: Judgment normal.       BP (!) 95/47   Pulse 93   SpO2 100%     Assessment:       Diagnosis Orders   1. Displacement of lumbar intervertebral disc without myelopathy  oxyCODONE-acetaminophen (PERCOCET) 5-325 MG per tablet   2. Strain of lumbar region, sequela  oxyCODONE-acetaminophen (PERCOCET) 5-325 MG per tablet   3. Displacement of intervertebral disc of lumbar region  oxyCODONE-acetaminophen (PERCOCET) 5-325 MG per tablet   4.  Sprain of ligament of lumbosacral joint, sequela  oxyCODONE-acetaminophen

## 2020-03-10 RX ORDER — TRAZODONE HYDROCHLORIDE 100 MG/1
TABLET ORAL
Qty: 30 TABLET | Refills: 0 | Status: SHIPPED | OUTPATIENT
Start: 2020-03-10 | End: 2020-04-06

## 2020-03-10 RX ORDER — FLUOXETINE HYDROCHLORIDE 20 MG/1
CAPSULE ORAL
Qty: 60 CAPSULE | Refills: 0 | Status: SHIPPED | OUTPATIENT
Start: 2020-03-10 | End: 2020-04-06

## 2020-03-10 RX ORDER — PREGABALIN 150 MG/1
CAPSULE ORAL
Qty: 60 CAPSULE | Refills: 0 | Status: SHIPPED | OUTPATIENT
Start: 2020-03-10 | End: 2020-04-06

## 2020-03-18 ENCOUNTER — OFFICE VISIT (OUTPATIENT)
Dept: FAMILY MEDICINE CLINIC | Age: 65
End: 2020-03-18
Payer: COMMERCIAL

## 2020-03-18 VITALS
SYSTOLIC BLOOD PRESSURE: 105 MMHG | BODY MASS INDEX: 29.66 KG/M2 | DIASTOLIC BLOOD PRESSURE: 55 MMHG | WEIGHT: 189 LBS | OXYGEN SATURATION: 95 % | HEIGHT: 67 IN | HEART RATE: 66 BPM

## 2020-03-18 PROCEDURE — 1123F ACP DISCUSS/DSCN MKR DOCD: CPT | Performed by: FAMILY MEDICINE

## 2020-03-18 PROCEDURE — G8484 FLU IMMUNIZE NO ADMIN: HCPCS | Performed by: FAMILY MEDICINE

## 2020-03-18 PROCEDURE — 1090F PRES/ABSN URINE INCON ASSESS: CPT | Performed by: FAMILY MEDICINE

## 2020-03-18 PROCEDURE — G8427 DOCREV CUR MEDS BY ELIG CLIN: HCPCS | Performed by: FAMILY MEDICINE

## 2020-03-18 PROCEDURE — G8417 CALC BMI ABV UP PARAM F/U: HCPCS | Performed by: FAMILY MEDICINE

## 2020-03-18 PROCEDURE — 1036F TOBACCO NON-USER: CPT | Performed by: FAMILY MEDICINE

## 2020-03-18 PROCEDURE — 99214 OFFICE O/P EST MOD 30 MIN: CPT | Performed by: FAMILY MEDICINE

## 2020-03-18 PROCEDURE — 3017F COLORECTAL CA SCREEN DOC REV: CPT | Performed by: FAMILY MEDICINE

## 2020-03-18 PROCEDURE — G8400 PT W/DXA NO RESULTS DOC: HCPCS | Performed by: FAMILY MEDICINE

## 2020-03-18 PROCEDURE — 4040F PNEUMOC VAC/ADMIN/RCVD: CPT | Performed by: FAMILY MEDICINE

## 2020-03-18 RX ORDER — OXYCODONE HYDROCHLORIDE AND ACETAMINOPHEN 5; 325 MG/1; MG/1
1 TABLET ORAL 2 TIMES DAILY
Qty: 60 TABLET | Refills: 0 | Status: SHIPPED | OUTPATIENT
Start: 2020-03-18 | End: 2020-04-17 | Stop reason: SDUPTHER

## 2020-03-18 ASSESSMENT — ENCOUNTER SYMPTOMS
PHOTOPHOBIA: 0
EYE PAIN: 0
SHORTNESS OF BREATH: 0
CHEST TIGHTNESS: 0
FACIAL SWELLING: 0
BACK PAIN: 1
TROUBLE SWALLOWING: 0
COLOR CHANGE: 0
NAUSEA: 0
SINUS PRESSURE: 0
ABDOMINAL PAIN: 0
ANAL BLEEDING: 0
VOMITING: 0
WHEEZING: 0
APNEA: 0
EYE DISCHARGE: 0
CONSTIPATION: 0
ABDOMINAL DISTENTION: 0
DIARRHEA: 0
SORE THROAT: 0

## 2020-03-18 NOTE — PROGRESS NOTES
Raghu Zhong MD, PhD FAAFP  [address] is a 72 y.o. female who presents today for her medical conditions/complaints as noted below. Gracia Grayson is c/o of   Chief Complaint   Patient presents with    1 Month Follow-Up     BWC/ chronic low back pain         HPI:     Back Pain   This is a chronic problem. The current episode started more than 1 year ago. The problem has been waxing and waning since onset. The pain is present in the lumbar spine. The pain is at a severity of 5/10. The pain is moderate. The pain is worse during the day. The symptoms are aggravated by bending, position, sitting, standing and twisting. Stiffness is present at night. Associated symptoms include numbness. Pertinent negatives include no abdominal pain, chest pain or fever.        Hemoglobin A1C (%)   Date Value   08/20/2019 5.7             ( goal A1C is < 7)   No results found for: LABMICR  LDL Cholesterol (mg/dL)   Date Value   08/20/2019 89   10/27/2014 91   10/16/2012 128 (H)       (goal LDL is <100)   AST (U/L)   Date Value   08/20/2019 18     ALT (U/L)   Date Value   08/20/2019 10     BUN (mg/dL)   Date Value   09/29/2019 10     BP Readings from Last 3 Encounters:   03/18/20 (!) 105/55   02/17/20 (!) 95/47   01/17/20 (!) 89/54          (goal 120/80)    Past Medical History:   Diagnosis Date    Arthritis     Depression     Displacement of intervertebral disc, site unspecified, without myelopathy     BWC    Displacement of lumbar intervertebral disc without myelopathy     bwc    Hyperlipidemia     Lumbago     Bwc    Sprain of lumbar region     bwc    Sprain of lumbosacral (joint) (ligament)     BWC    Sprain of sacrum     BWC      Past Surgical History:   Procedure Laterality Date    ABDOMINAL EXPLORATION SURGERY      BACK SURGERY      X2    COLON SURGERY      COLONOSCOPY      DILATION AND CURETTAGE OF UTERUS      is nervous/anxious. The patient is not hyperactive. Objective:     Physical Exam  Vitals signs and nursing note reviewed. Constitutional:       General: She is not in acute distress. Appearance: She is well-developed. HENT:      Head: Normocephalic. Neck:      Musculoskeletal: Normal range of motion and neck supple. Thyroid: No thyromegaly. Cardiovascular:      Rate and Rhythm: Normal rate and regular rhythm. Heart sounds: Murmur present. Systolic murmur present with a grade of 1/6. Pulmonary:      Breath sounds: Normal breath sounds. No wheezing or rales. Chest:      Chest wall: No tenderness. Abdominal:      General: Bowel sounds are normal. There is no distension. Palpations: Abdomen is soft. There is no mass. Tenderness: There is no abdominal tenderness. There is no guarding or rebound. Musculoskeletal:         General: Tenderness present. Lumbar back: She exhibits decreased range of motion and tenderness. Back:    Lymphadenopathy:      Cervical: No cervical adenopathy. Skin:     General: Skin is warm. Findings: No rash. Neurological:      Mental Status: She is alert and oriented to person, place, and time. Psychiatric:         Attention and Perception: Attention and perception normal.         Mood and Affect: Mood and affect normal.         Speech: Speech normal.         Behavior: Behavior normal. Behavior is cooperative. Thought Content: Thought content normal.         Cognition and Memory: Memory normal.       BP (!) 105/55   Pulse 66   Ht 5' 7\" (1.702 m)   Wt 189 lb (85.7 kg)   SpO2 95%   BMI 29.60 kg/m²     Assessment:       Diagnosis Orders   1. Displacement of intervertebral disc of lumbar region  oxyCODONE-acetaminophen (PERCOCET) 5-325 MG per tablet   2. Strain of lumbar region, sequela  oxyCODONE-acetaminophen (PERCOCET) 5-325 MG per tablet   3.  Displacement of lumbar intervertebral disc without myelopathy oxyCODONE-acetaminophen (PERCOCET) 5-325 MG per tablet   4. Sprain of ligament of lumbosacral joint, sequela  oxyCODONE-acetaminophen (PERCOCET) 5-325 MG per tablet                 Plan:     The current medical regimen is effective;  continue present plan and medications. Controlled Substance Monitoring:    Acute and Chronic Pain Monitoring:   RX Monitoring 2/17/2020   Attestation -   Periodic Controlled Substance Monitoring No signs of potential drug abuse or diversion identified. Fall precautions  Isometric lumbar exercise daily  Pt asks to continue the same dose of her Percocet bid  prn to control pain and allow her functioning comfortably day and night time. Return in about 1 month (around 4/18/2020) for follow up. No orders of the defined types were placed in this encounter. Patient given educational materials - see patient instructions. Discussed use, benefit,and side effects of prescribed medications. All patient questions answered. Pt voiced understanding. Reviewed health maintenance. Instructed to continue current medications, diet and exercise. Patient agreed withtreatment plan. Follow up as directed.      Electronically signed by Evelin Magallanes MD on 3/18/2020 at 3:40 PM

## 2020-03-19 RX ORDER — PHENTERMINE HYDROCHLORIDE 37.5 MG/1
37.5 TABLET ORAL
Qty: 30 TABLET | Refills: 0 | Status: SHIPPED | OUTPATIENT
Start: 2020-03-19 | End: 2020-04-18

## 2020-04-06 ENCOUNTER — TELEPHONE (OUTPATIENT)
Dept: FAMILY MEDICINE CLINIC | Age: 65
End: 2020-04-06

## 2020-04-06 RX ORDER — FLUOXETINE HYDROCHLORIDE 20 MG/1
CAPSULE ORAL
Qty: 60 CAPSULE | Refills: 0 | Status: SHIPPED | OUTPATIENT
Start: 2020-04-06 | End: 2020-05-11

## 2020-04-06 RX ORDER — TRAZODONE HYDROCHLORIDE 100 MG/1
TABLET ORAL
Qty: 30 TABLET | Refills: 0 | Status: SHIPPED | OUTPATIENT
Start: 2020-04-06 | End: 2020-05-11

## 2020-04-06 RX ORDER — PREGABALIN 150 MG/1
CAPSULE ORAL
Qty: 60 CAPSULE | Refills: 0 | Status: SHIPPED | OUTPATIENT
Start: 2020-04-06 | End: 2020-04-17 | Stop reason: SDUPTHER

## 2020-04-06 NOTE — TELEPHONE ENCOUNTER
Message for Claims Management  My licenses , ALL of them are perfectly OK and [facility] ([facility]) my employer is making sure that I am OK  If it  will be despute between Patient, [place] [address] [facility], DO NOT blame me   Nisha Mars MD PhD FAAFP

## 2020-04-09 NOTE — TELEPHONE ENCOUNTER
Spoke to Queenie Treviño and they stated that the claim has now begun to process so there no longer appears to be an issue.

## 2020-04-17 ENCOUNTER — OFFICE VISIT (OUTPATIENT)
Dept: FAMILY MEDICINE CLINIC | Age: 65
End: 2020-04-17
Payer: COMMERCIAL

## 2020-04-17 VITALS
HEART RATE: 76 BPM | SYSTOLIC BLOOD PRESSURE: 110 MMHG | WEIGHT: 194.6 LBS | DIASTOLIC BLOOD PRESSURE: 52 MMHG | HEIGHT: 67 IN | OXYGEN SATURATION: 92 % | BODY MASS INDEX: 30.54 KG/M2

## 2020-04-17 PROCEDURE — 99214 OFFICE O/P EST MOD 30 MIN: CPT | Performed by: FAMILY MEDICINE

## 2020-04-17 RX ORDER — PREGABALIN 150 MG/1
CAPSULE ORAL
Qty: 60 CAPSULE | Refills: 0 | Status: SHIPPED | OUTPATIENT
Start: 2020-04-17 | End: 2020-06-10 | Stop reason: SDUPTHER

## 2020-04-17 RX ORDER — OXYCODONE HYDROCHLORIDE AND ACETAMINOPHEN 5; 325 MG/1; MG/1
1 TABLET ORAL 2 TIMES DAILY
Qty: 60 TABLET | Refills: 0 | Status: SHIPPED | OUTPATIENT
Start: 2020-04-17 | End: 2020-05-20 | Stop reason: SDUPTHER

## 2020-04-17 ASSESSMENT — ENCOUNTER SYMPTOMS
FACIAL SWELLING: 0
TROUBLE SWALLOWING: 0
NAUSEA: 0
WHEEZING: 0
APNEA: 0
DIARRHEA: 0
ABDOMINAL PAIN: 0
PHOTOPHOBIA: 0
VOMITING: 0
SHORTNESS OF BREATH: 0
CHEST TIGHTNESS: 0
SORE THROAT: 0
EYE DISCHARGE: 0
BACK PAIN: 1
ANAL BLEEDING: 0
ABDOMINAL DISTENTION: 0
COLOR CHANGE: 0
EYE PAIN: 0
CONSTIPATION: 0
SINUS PRESSURE: 0

## 2020-04-17 NOTE — PROGRESS NOTES
Jeremy Richardson MD, PhD FAAFP  [address] [place], [place] [address]Gavin is a 72 y.o. female who presents today for her medical conditions/complaints as noted below. Alvinoaxel Curling is c/o of   Chief Complaint   Patient presents with    1 Month Follow-Up     [facility]         HPI:     Back Pain   This is a chronic problem. The current episode started more than 1 year ago. The problem has been waxing and waning since onset. The pain is present in the sacro-iliac and lumbar spine. The quality of the pain is described as burning, aching, cramping and shooting. The pain radiates to the right foot, left thigh, right thigh and right knee. The pain is at a severity of 5/10. The pain is moderate. The pain is worse during the day. The symptoms are aggravated by position, standing, twisting and bending. Stiffness is present at night. Associated symptoms include numbness and weakness. Pertinent negatives include no abdominal pain, chest pain or fever.        Hemoglobin A1C (%)   Date Value   08/20/2019 5.7             ( goal A1C is < 7)   No results found for: LABMICR  LDL Cholesterol (mg/dL)   Date Value   08/20/2019 89   10/27/2014 91   10/16/2012 128 (H)       (goal LDL is <100)   AST (U/L)   Date Value   08/20/2019 18     ALT (U/L)   Date Value   08/20/2019 10     BUN (mg/dL)   Date Value   09/29/2019 10     BP Readings from Last 3 Encounters:   04/17/20 (!) 110/52   03/18/20 (!) 105/55   02/17/20 (!) 95/47          (goal 120/80)    Past Medical History:   Diagnosis Date    Arthritis     Depression     Displacement of intervertebral disc, site unspecified, without myelopathy     BWC    Displacement of lumbar intervertebral disc without myelopathy     bwc    Hyperlipidemia     Lumbago     Bwc    Sprain of lumbar region     bwc    Sprain of lumbosacral (joint) (ligament)     BWC    Sprain of sacrum     BWC      Past Surgical History:   Procedure Laterality Date    ABDOMINAL EXPLORATION SURGERY      BACK SURGERY      X2    COLON SURGERY      COLONOSCOPY      DILATION AND CURETTAGE OF UTERUS      FOOT SURGERY Right     HAND SURGERY Right 14    OVARY REMOVAL Bilateral     WRIST ARTHROPLASTY Left        Family History   Problem Relation Age of Onset    Heart Disease Father     High Blood Pressure Father        Social History     Tobacco Use    Smoking status: Former Smoker     Packs/day: 0.25     Years: 15.00     Pack years: 3.75     Types: Cigarettes     Last attempt to quit: 2019     Years since quittin.6    Smokeless tobacco: Never Used    Tobacco comment: quit 32 years ago   Substance Use Topics    Alcohol use: Yes     Alcohol/week: 2.0 standard drinks     Types: 2 Cans of beer per week     Comment: OCCASSION      Current Outpatient Medications   Medication Sig Dispense Refill    oxyCODONE-acetaminophen (PERCOCET) 5-325 MG per tablet Take 1 tablet by mouth 2 times daily for 30 days. 60 tablet 0    pregabalin (LYRICA) 150 MG capsule Take 1 capsule by mouth twice daily 60 capsule 0    traZODone (DESYREL) 100 MG tablet TAKE 1 TABLET BY MOUTH ONCE DAILY AT BEDTIME 30 tablet 0    FLUoxetine (PROZAC) 20 MG capsule TAKE 2 CAPSULES BY MOUTH IN THE MORNING 60 capsule 0    phentermine (ADIPEX-P) 37.5 MG tablet Take 1 tablet by mouth every morning (before breakfast) for 30 days.  30 tablet 0    simvastatin (ZOCOR) 40 MG tablet TAKE 1 TABLET BY MOUTH  EVERY NIGHT AT BEDTIME 90 tablet 2    Calcium Carbonate-Vitamin D (OSCAL 500/200 D-3 PO) Take 500 mg by mouth daily      meclizine (ANTIVERT) 25 MG tablet Take 12.5 mg by mouth 2 times daily      aspirin (ASPIRIN CHILDRENS) 81 MG chewable tablet Take 1 tablet by mouth daily 30 tablet 0    meloxicam (MOBIC) 15 MG tablet Take 1 tablet by mouth daily 30 tablet 0    omeprazole (PRILOSEC) 40 MG delayed release capsule Take 1 capsule by mouth daily for 30 doses 30 capsule 0     No current facility-administered medications for this visit. Allergies   Allergen Reactions    Ambien [Zolpidem Tartrate] Shortness Of Breath    Ativan [Lorazepam] Shortness Of Breath    Darvon [Propoxyphene] Other (See Comments)     Mental changes    Restoril [Temazepam]     Topamax [Topiramate]        Health Maintenance   Topic Date Due    Hepatitis C screen  1955    HIV screen  02/22/1970    DTaP/Tdap/Td vaccine (1 - Tdap) 02/22/1974    Cervical cancer screen  02/22/1976    Shingles Vaccine (1 of 2) 02/22/2005    Colon cancer screen colonoscopy  02/22/2005    DEXA (modify frequency per FRAX score)  02/22/2010    Pneumococcal 65+ years Vaccine (2 of 2 - PPSV23) 02/22/2020    A1C test (Diabetic or Prediabetic)  08/20/2020    Lipid screen  08/20/2020    Breast cancer screen  11/20/2021    Flu vaccine  Completed    Hepatitis A vaccine  Aged Out    Hepatitis B vaccine  Aged Out    Hib vaccine  Aged Out    Meningococcal (ACWY) vaccine  Aged Out       Subjective:      Review of Systems   Constitutional: Negative for fatigue, fever and unexpected weight change. HENT: Negative for congestion, ear discharge, facial swelling, sinus pressure, sore throat and trouble swallowing. Eyes: Negative for photophobia, pain and discharge. Respiratory: Negative for apnea, chest tightness, shortness of breath and wheezing. Cardiovascular: Negative for chest pain and palpitations. Gastrointestinal: Negative for abdominal distention, abdominal pain, anal bleeding, constipation, diarrhea, nausea and vomiting. Endocrine: Negative for cold intolerance, heat intolerance, polydipsia, polyphagia and polyuria. Genitourinary: Negative for difficulty urinating, flank pain, frequency and hematuria. Musculoskeletal: Positive for arthralgias, back pain and gait problem. Negative for neck pain. Skin: Negative for color change and rash. Neurological: Positive for weakness and numbness.  Negative for dizziness, lumbar region  oxyCODONE-acetaminophen (PERCOCET) 5-325 MG per tablet    pregabalin (LYRICA) 150 MG capsule   2. Strain of lumbar region, sequela  oxyCODONE-acetaminophen (PERCOCET) 5-325 MG per tablet    pregabalin (LYRICA) 150 MG capsule   3. Displacement of lumbar intervertebral disc without myelopathy  oxyCODONE-acetaminophen (PERCOCET) 5-325 MG per tablet    pregabalin (LYRICA) 150 MG capsule   4. Sprain of ligament of lumbosacral joint, sequela  oxyCODONE-acetaminophen (PERCOCET) 5-325 MG per tablet    pregabalin (LYRICA) 150 MG capsule   5. Sacrum sprain, sequela  pregabalin (LYRICA) 150 MG capsule                 Plan:     The current medical regimen is effective;  continue present plan and medications. Controlled Substance Monitoring:    Acute and Chronic Pain Monitoring:   RX Monitoring 4/17/2020   Attestation -   Periodic Controlled Substance Monitoring No signs of potential drug abuse or diversion identified. Return in about 2 months (around 6/17/2020) for follow up. No orders of the defined types were placed in this encounter. Lyrica bid/Percocet prn  Lumbar isometric exercise daily  Avoid heavy lifting  Weight reduction       Patient given educational materials - see patient instructions. Discussed use, benefit,and side effects of prescribed medications. All patient questions answered. Pt voiced understanding. Reviewed health maintenance. Instructed to continue current medications, diet and exercise. Patient agreed withtreatment plan. Follow up as directed.      Electronically signed by Livia Gray MD on 4/17/2020 at 6:33 PM

## 2020-05-11 RX ORDER — FLUOXETINE HYDROCHLORIDE 20 MG/1
CAPSULE ORAL
Qty: 60 CAPSULE | Refills: 0 | Status: SHIPPED | OUTPATIENT
Start: 2020-05-11 | End: 2020-06-10 | Stop reason: SDUPTHER

## 2020-05-11 RX ORDER — TRAZODONE HYDROCHLORIDE 100 MG/1
TABLET ORAL
Qty: 30 TABLET | Refills: 0 | Status: SHIPPED | OUTPATIENT
Start: 2020-05-11 | End: 2020-06-10 | Stop reason: SDUPTHER

## 2020-05-12 RX ORDER — PHENTERMINE HYDROCHLORIDE 37.5 MG/1
37.5 TABLET ORAL
Qty: 30 TABLET | Refills: 0 | Status: SHIPPED | OUTPATIENT
Start: 2020-05-12 | End: 2020-06-11

## 2020-05-20 RX ORDER — OXYCODONE HYDROCHLORIDE AND ACETAMINOPHEN 5; 325 MG/1; MG/1
1 TABLET ORAL 2 TIMES DAILY
Qty: 60 TABLET | Refills: 0 | Status: SHIPPED | OUTPATIENT
Start: 2020-05-20 | End: 2020-06-10 | Stop reason: SDUPTHER

## 2020-06-03 ENCOUNTER — TELEPHONE (OUTPATIENT)
Dept: FAMILY MEDICINE CLINIC | Age: 65
End: 2020-06-03

## 2020-06-10 ENCOUNTER — TELEMEDICINE (OUTPATIENT)
Dept: FAMILY MEDICINE CLINIC | Age: 65
End: 2020-06-10
Payer: COMMERCIAL

## 2020-06-10 PROCEDURE — 99214 OFFICE O/P EST MOD 30 MIN: CPT | Performed by: FAMILY MEDICINE

## 2020-06-10 RX ORDER — FLUOXETINE HYDROCHLORIDE 20 MG/1
CAPSULE ORAL
Qty: 60 CAPSULE | Refills: 0 | Status: SHIPPED | OUTPATIENT
Start: 2020-06-10 | End: 2020-07-10

## 2020-06-10 RX ORDER — TRAZODONE HYDROCHLORIDE 100 MG/1
TABLET ORAL
Qty: 30 TABLET | Refills: 0 | Status: SHIPPED | OUTPATIENT
Start: 2020-06-10 | End: 2020-07-10

## 2020-06-10 RX ORDER — OXYCODONE HYDROCHLORIDE AND ACETAMINOPHEN 5; 325 MG/1; MG/1
1 TABLET ORAL 2 TIMES DAILY
Qty: 60 TABLET | Refills: 0 | Status: SHIPPED | OUTPATIENT
Start: 2020-06-10 | End: 2020-07-13 | Stop reason: SDUPTHER

## 2020-06-10 RX ORDER — PREGABALIN 150 MG/1
CAPSULE ORAL
Qty: 60 CAPSULE | Refills: 0 | Status: SHIPPED | OUTPATIENT
Start: 2020-06-10 | End: 2020-07-10

## 2020-06-10 ASSESSMENT — ENCOUNTER SYMPTOMS
TROUBLE SWALLOWING: 0
SINUS PRESSURE: 0
NAUSEA: 0
FACIAL SWELLING: 0
BACK PAIN: 1
DIARRHEA: 0
SHORTNESS OF BREATH: 0
EYE DISCHARGE: 0
APNEA: 0
CONSTIPATION: 0
SORE THROAT: 0
EYE PAIN: 0
PHOTOPHOBIA: 0
COLOR CHANGE: 0
ABDOMINAL DISTENTION: 0
CHEST TIGHTNESS: 0
ABDOMINAL PAIN: 0
ANAL BLEEDING: 0
VOMITING: 0
WHEEZING: 0

## 2020-06-10 NOTE — PROGRESS NOTES
light-headedness. Hematological: Negative for adenopathy. Psychiatric/Behavioral: Positive for dysphoric mood and sleep disturbance. Negative for agitation, behavioral problems, confusion, hallucinations and suicidal ideas. The patient is nervous/anxious. The patient is not hyperactive. Objective:     Physical Exam  Neurological:      Mental Status: She is alert. Psychiatric:         Attention and Perception: Attention and perception normal.         Mood and Affect: Affect normal. Mood is depressed. Speech: Speech normal.       There were no vitals taken for this visit. Assessment:       Diagnosis Orders   1. Displacement of lumbar intervertebral disc without myelopathy     2. Displacement of intervertebral disc of lumbar region     3. Sprain of ligament of lumbosacral joint, sequela                   Plan:     The current medical regimen is effective;  continue present plan and medications. Controlled Substance Monitoring:    Acute and Chronic Pain Monitoring:   RX Monitoring 6/10/2020   Attestation -   Periodic Controlled Substance Monitoring No signs of potential drug abuse or diversion identified. Rx percocet prn  Fall precautions  Isometric lumbar exercise daily  Avoid heavy lifting      Yannick Tello is a 72 y.o. female being evaluated by a PHONE VISIT  encounter to address concerns as mentioned above. A caregiver was present when appropriate. Due to this being a TeleHealth encounter (During [facility]- public health emergency), evaluation of the following organ systems was limited: Vitals/Constitutional/EENT/Resp/CV/GI//MS/Neuro/Skin/Heme-Lymph-Imm.   Pursuant to the emergency declaration under the [facility], [address] [place] [address] authority and the [employer] Act, this Virtual Visit was conducted with patient's (and/or legal guardian's) consent, to reduce the patient's risk of exposure to COVID-19

## 2020-06-12 RX ORDER — PHENTERMINE HYDROCHLORIDE 37.5 MG/1
37.5 CAPSULE ORAL EVERY MORNING
Qty: 30 CAPSULE | Refills: 0 | OUTPATIENT
Start: 2020-06-12 | End: 2020-07-12

## 2020-06-12 NOTE — TELEPHONE ENCOUNTER
Elaine Arrieta is calling to request a refill on the following medication(s):    Last Visit Date (If Applicable):  Visit date not found    Next Visit Date:    Visit date not found    Medication Request:  Requested Prescriptions     Pending Prescriptions Disp Refills    phentermine 37.5 MG capsule 30 capsule 0     Sig: Take 1 capsule by mouth every morning for 30 days.

## 2020-06-17 RX ORDER — PHENTERMINE HYDROCHLORIDE 37.5 MG/1
37.5 CAPSULE ORAL EVERY MORNING
Qty: 30 CAPSULE | Refills: 0 | Status: SHIPPED | OUTPATIENT
Start: 2020-06-17 | End: 2020-07-17

## 2020-07-10 RX ORDER — TRAZODONE HYDROCHLORIDE 100 MG/1
TABLET ORAL
Qty: 30 TABLET | Refills: 0 | Status: SHIPPED | OUTPATIENT
Start: 2020-07-10 | End: 2020-08-26

## 2020-07-10 RX ORDER — FLUOXETINE HYDROCHLORIDE 20 MG/1
CAPSULE ORAL
Qty: 60 CAPSULE | Refills: 0 | Status: SHIPPED | OUTPATIENT
Start: 2020-07-10 | End: 2020-07-15 | Stop reason: SDUPTHER

## 2020-07-10 RX ORDER — PREGABALIN 150 MG/1
CAPSULE ORAL
Qty: 60 CAPSULE | Refills: 0 | Status: SHIPPED | OUTPATIENT
Start: 2020-07-10 | End: 2020-08-13

## 2020-07-13 RX ORDER — OXYCODONE HYDROCHLORIDE AND ACETAMINOPHEN 5; 325 MG/1; MG/1
1 TABLET ORAL 2 TIMES DAILY
Qty: 60 TABLET | Refills: 0 | Status: SHIPPED | OUTPATIENT
Start: 2020-07-13 | End: 2020-08-13 | Stop reason: SDUPTHER

## 2020-07-15 RX ORDER — FLUOXETINE HYDROCHLORIDE 20 MG/1
CAPSULE ORAL
Qty: 60 CAPSULE | Refills: 5 | Status: SHIPPED | OUTPATIENT
Start: 2020-07-15 | End: 2021-02-23 | Stop reason: SDUPTHER

## 2020-07-20 ENCOUNTER — TELEPHONE (OUTPATIENT)
Dept: FAMILY MEDICINE CLINIC | Age: 65
End: 2020-07-20

## 2020-07-21 RX ORDER — METHYLPREDNISOLONE 4 MG/1
TABLET ORAL
Qty: 1 KIT | Refills: 0 | Status: SHIPPED | OUTPATIENT
Start: 2020-07-21 | End: 2020-07-27

## 2020-08-13 RX ORDER — OXYCODONE HYDROCHLORIDE AND ACETAMINOPHEN 5; 325 MG/1; MG/1
1 TABLET ORAL 2 TIMES DAILY
Qty: 60 TABLET | Refills: 0 | Status: SHIPPED | OUTPATIENT
Start: 2020-08-13 | End: 2020-09-12

## 2020-08-13 RX ORDER — PREGABALIN 150 MG/1
CAPSULE ORAL
Qty: 60 CAPSULE | Refills: 0 | Status: SHIPPED | OUTPATIENT
Start: 2020-08-13 | End: 2020-10-16

## 2020-08-13 NOTE — TELEPHONE ENCOUNTER
Akash Hopkins is calling to request a refill on the following medication(s):    Last Visit Date (If Applicable):  3/75/9932    Next Visit Date:    8/20/2020    Medication Request:  Requested Prescriptions     Pending Prescriptions Disp Refills    pregabalin (LYRICA) 150 MG capsule [Pharmacy Med Name: Pregabalin 150 MG Oral Capsule] 60 capsule 0     Sig: Take 1 capsule by mouth twice daily    oxyCODONE-acetaminophen (PERCOCET) 5-325 MG per tablet 60 tablet 0     Sig: Take 1 tablet by mouth 2 times daily for 30 days.

## 2020-08-20 ENCOUNTER — OFFICE VISIT (OUTPATIENT)
Dept: FAMILY MEDICINE CLINIC | Age: 65
End: 2020-08-20
Payer: COMMERCIAL

## 2020-08-20 VITALS
HEIGHT: 67 IN | DIASTOLIC BLOOD PRESSURE: 76 MMHG | OXYGEN SATURATION: 96 % | TEMPERATURE: 97.5 F | BODY MASS INDEX: 30.13 KG/M2 | SYSTOLIC BLOOD PRESSURE: 118 MMHG | WEIGHT: 192 LBS | HEART RATE: 60 BPM

## 2020-08-20 PROCEDURE — G8400 PT W/DXA NO RESULTS DOC: HCPCS | Performed by: FAMILY MEDICINE

## 2020-08-20 PROCEDURE — G8417 CALC BMI ABV UP PARAM F/U: HCPCS | Performed by: FAMILY MEDICINE

## 2020-08-20 PROCEDURE — 3017F COLORECTAL CA SCREEN DOC REV: CPT | Performed by: FAMILY MEDICINE

## 2020-08-20 PROCEDURE — 1123F ACP DISCUSS/DSCN MKR DOCD: CPT | Performed by: FAMILY MEDICINE

## 2020-08-20 PROCEDURE — 1090F PRES/ABSN URINE INCON ASSESS: CPT | Performed by: FAMILY MEDICINE

## 2020-08-20 PROCEDURE — G8427 DOCREV CUR MEDS BY ELIG CLIN: HCPCS | Performed by: FAMILY MEDICINE

## 2020-08-20 PROCEDURE — 4040F PNEUMOC VAC/ADMIN/RCVD: CPT | Performed by: FAMILY MEDICINE

## 2020-08-20 PROCEDURE — 1036F TOBACCO NON-USER: CPT | Performed by: FAMILY MEDICINE

## 2020-08-20 PROCEDURE — 99214 OFFICE O/P EST MOD 30 MIN: CPT | Performed by: FAMILY MEDICINE

## 2020-08-20 RX ORDER — HYDROXYZINE HYDROCHLORIDE 25 MG/1
25 TABLET, FILM COATED ORAL NIGHTLY
Qty: 30 TABLET | Refills: 0 | Status: SHIPPED | OUTPATIENT
Start: 2020-08-20 | End: 2020-09-19

## 2020-08-20 RX ORDER — PREDNISONE 10 MG/1
10 TABLET ORAL DAILY
Qty: 20 TABLET | Refills: 0 | Status: SHIPPED | OUTPATIENT
Start: 2020-08-20 | End: 2020-08-30

## 2020-08-20 ASSESSMENT — ENCOUNTER SYMPTOMS
EYE PAIN: 0
SHORTNESS OF BREATH: 0
PHOTOPHOBIA: 0
COLOR CHANGE: 0
DIARRHEA: 0
CONSTIPATION: 0
ANAL BLEEDING: 0
BACK PAIN: 1
WHEEZING: 0
EYE DISCHARGE: 0
VOMITING: 0
APNEA: 0
TROUBLE SWALLOWING: 0
ABDOMINAL PAIN: 0
FACIAL SWELLING: 0
SORE THROAT: 0
SINUS PRESSURE: 0
ABDOMINAL DISTENTION: 0
NAUSEA: 0
CHEST TIGHTNESS: 0

## 2020-08-20 NOTE — PROGRESS NOTES
Cyrus Acevedo MD, PhD FAAFP  [address]Gavin is a 72 y.o. female who presents today for her medical conditions/complaints as noted below.   Agus Watson is c/o of   Chief Complaint   Patient presents with    Lower Back Pain     BWC         HPI:     HPI    Hemoglobin A1C (%)   Date Value   2019 5.7             ( goal A1C is < 7)   No results found for: LABMICR  LDL Cholesterol (mg/dL)   Date Value   2019 89   10/27/2014 91   10/16/2012 128 (H)       (goal LDL is <100)   AST (U/L)   Date Value   2019 18     ALT (U/L)   Date Value   2019 10     BUN (mg/dL)   Date Value   2019 10     BP Readings from Last 3 Encounters:   20 118/76   20 (!) 110/52   20 (!) 105/55          (goal 120/80)    Past Medical History:   Diagnosis Date    Arthritis     Depression     Displacement of intervertebral disc, site unspecified, without myelopathy     BWC    Displacement of lumbar intervertebral disc without myelopathy     bwc    Hyperlipidemia     Lumbago     Bwc    Sprain of lumbar region     bwc    Sprain of lumbosacral (joint) (ligament)     BWC    Sprain of sacrum     BWC      Past Surgical History:   Procedure Laterality Date    ABDOMINAL EXPLORATION SURGERY      BACK SURGERY      X2    COLON SURGERY      COLONOSCOPY      DILATION AND CURETTAGE OF UTERUS      FOOT SURGERY Right     HAND SURGERY Right 14    OVARY REMOVAL Bilateral     WRIST ARTHROPLASTY Left        Family History   Problem Relation Age of Onset    Heart Disease Father     High Blood Pressure Father        Social History     Tobacco Use    Smoking status: Former Smoker     Packs/day: 0.25     Years: 15.00     Pack years: 3.75     Types: Cigarettes     Last attempt to quit: 2019     Years since quittin.9    Smokeless tobacco: Never Used    Tobacco comment: quit 32 years ago Substance Use Topics    Alcohol use: Yes     Alcohol/week: 2.0 standard drinks     Types: 2 Cans of beer per week     Comment: OCCASSION      Current Outpatient Medications   Medication Sig Dispense Refill    predniSONE (DELTASONE) 10 MG tablet Take 1 tablet by mouth daily for 10 days Take 3 tabs a day x 3 days then 2 tabs a day x 3 days then 1 tab a day x 5 days 20 tablet 0    hydrOXYzine (ATARAX) 25 MG tablet Take 1 tablet by mouth nightly 30 tablet 0    pregabalin (LYRICA) 150 MG capsule Take 1 capsule by mouth twice daily 60 capsule 0    oxyCODONE-acetaminophen (PERCOCET) 5-325 MG per tablet Take 1 tablet by mouth 2 times daily for 30 days. 60 tablet 0    FLUoxetine (PROZAC) 20 MG capsule Take 1 capsule by mouth BID 60 capsule 5    traZODone (DESYREL) 100 MG tablet Take 1 tablet by mouth once daily 30 tablet 0    simvastatin (ZOCOR) 40 MG tablet TAKE 1 TABLET BY MOUTH  EVERY NIGHT AT BEDTIME 90 tablet 2    Calcium Carbonate-Vitamin D (OSCAL 500/200 D-3 PO) Take 500 mg by mouth daily      meclizine (ANTIVERT) 25 MG tablet Take 12.5 mg by mouth 2 times daily      aspirin (ASPIRIN CHILDRENS) 81 MG chewable tablet Take 1 tablet by mouth daily 30 tablet 0    meloxicam (MOBIC) 15 MG tablet Take 1 tablet by mouth daily 30 tablet 0    omeprazole (PRILOSEC) 40 MG delayed release capsule Take 1 capsule by mouth daily for 30 doses 30 capsule 0     No current facility-administered medications for this visit.       Allergies   Allergen Reactions    Ambien [Zolpidem Tartrate] Shortness Of Breath    Ativan [Lorazepam] Shortness Of Breath    Darvon [Propoxyphene] Other (See Comments)     Mental changes    Restoril [Temazepam]     Topamax [Topiramate]        Health Maintenance   Topic Date Due    Hepatitis C screen  1955    HIV screen  02/22/1970    DTaP/Tdap/Td vaccine (1 - Tdap) 02/22/1974    Cervical cancer screen  02/22/1976    Shingles Vaccine (1 of 2) 02/22/2005    Colon cancer screen range of motion and neck supple. Thyroid: No thyromegaly. Cardiovascular:      Rate and Rhythm: Normal rate and regular rhythm. Heart sounds: Normal heart sounds. No murmur. Pulmonary:      Breath sounds: Normal breath sounds. No wheezing or rales. Chest:      Chest wall: No tenderness. Abdominal:      General: Bowel sounds are normal. There is no distension. Palpations: Abdomen is soft. There is no mass. Tenderness: There is no abdominal tenderness. There is no guarding or rebound. Musculoskeletal:         General: Tenderness present. Lumbar back: She exhibits decreased range of motion and tenderness. Back:    Lymphadenopathy:      Cervical: No cervical adenopathy. Skin:     General: Skin is warm. Findings: Rash (purpuric rash on face and trunk) present. Rash is purpuric. Neurological:      Mental Status: She is alert and oriented to person, place, and time. /76   Pulse 60   Temp 97.5 °F (36.4 °C) (Temporal)   Ht 5' 7\" (1.702 m)   Wt 192 lb (87.1 kg)   SpO2 96%   BMI 30.07 kg/m²     Assessment:       Diagnosis Orders   1. Displacement of lumbar intervertebral disc without myelopathy     2. Strain of lumbar region, sequela     3. Displacement of intervertebral disc of lumbar region                   Plan:     The current medical regimen is effective;  continue present plan and medications. Avoid heavy lifting  Controlled Substance Monitoring:    Acute and Chronic Pain Monitoring:   RX Monitoring 8/20/2020   Attestation -   Periodic Controlled Substance Monitoring No signs of potential drug abuse or diversion identified. Short course tapered Prednisone  Isometric lumbar exercise    Return in about 2 months (around 10/20/2020) for follow up. No orders of the defined types were placed in this encounter. Patient given educational materials - see patient instructions. Discussed use, benefit,and side effects of prescribed medications. All patient questions answered. Pt voiced understanding. Reviewed health maintenance. Instructed to continue current medications, diet and exercise. Patient agreed withtreatment plan. Follow up as directed.      Electronically signed by Sinan Westfall MD on 8/20/2020 at 7:00 PM

## 2020-08-26 RX ORDER — TRAZODONE HYDROCHLORIDE 100 MG/1
TABLET ORAL
Qty: 30 TABLET | Refills: 0 | Status: SHIPPED | OUTPATIENT
Start: 2020-08-26 | End: 2020-12-14 | Stop reason: SDUPTHER

## 2020-08-26 NOTE — TELEPHONE ENCOUNTER
Carlos Mary is calling to request a refill on the following medication(s):    Last Visit Date (If Applicable):  6/55/7821    Next Visit Date:    9/18/2020    Medication Request:  Requested Prescriptions     Pending Prescriptions Disp Refills    traZODone (DESYREL) 100 MG tablet [Pharmacy Med Name: traZODone HCl 100 MG Oral Tablet] 30 tablet 0     Sig: Take 1 tablet by mouth once daily

## 2020-09-25 ENCOUNTER — TELEPHONE (OUTPATIENT)
Dept: FAMILY MEDICINE CLINIC | Age: 65
End: 2020-09-25

## 2020-09-25 NOTE — TELEPHONE ENCOUNTER
Dr Brijesh Mahoney oked refills for patient, trazadone, meclizine, and Simvastatin for one month only will be put on her schedule she will let me know where

## 2020-10-16 ENCOUNTER — APPOINTMENT (OUTPATIENT)
Dept: CT IMAGING | Age: 65
DRG: 390 | End: 2020-10-16
Payer: MEDICARE

## 2020-10-16 ENCOUNTER — APPOINTMENT (OUTPATIENT)
Dept: GENERAL RADIOLOGY | Age: 65
DRG: 390 | End: 2020-10-16
Payer: MEDICARE

## 2020-10-16 ENCOUNTER — HOSPITAL ENCOUNTER (INPATIENT)
Age: 65
LOS: 3 days | Discharge: HOME | DRG: 390 | End: 2020-10-19
Attending: EMERGENCY MEDICINE | Admitting: INTERNAL MEDICINE
Payer: MEDICARE

## 2020-10-16 PROBLEM — K56.609 SMALL BOWEL OBSTRUCTION (HCC): Status: ACTIVE | Noted: 2020-10-16

## 2020-10-16 LAB
ABSOLUTE EOS #: 0.2 K/UL (ref 0–0.4)
ABSOLUTE IMMATURE GRANULOCYTE: ABNORMAL K/UL (ref 0–0.3)
ABSOLUTE LYMPH #: 2 K/UL (ref 1–4.8)
ABSOLUTE MONO #: 0.7 K/UL (ref 0.1–1.3)
ALBUMIN SERPL-MCNC: 4.1 G/DL (ref 3.5–5.2)
ALBUMIN/GLOBULIN RATIO: ABNORMAL (ref 1–2.5)
ALP BLD-CCNC: 70 U/L (ref 35–104)
ALT SERPL-CCNC: 12 U/L (ref 5–33)
ANION GAP SERPL CALCULATED.3IONS-SCNC: 13 MMOL/L (ref 9–17)
AST SERPL-CCNC: 19 U/L
BASOPHILS # BLD: 1 % (ref 0–2)
BASOPHILS ABSOLUTE: 0.1 K/UL (ref 0–0.2)
BILIRUB SERPL-MCNC: 0.24 MG/DL (ref 0.3–1.2)
BILIRUBIN URINE: NEGATIVE
BUN BLDV-MCNC: 17 MG/DL (ref 8–23)
BUN/CREAT BLD: ABNORMAL (ref 9–20)
CALCIUM SERPL-MCNC: 9.8 MG/DL (ref 8.6–10.4)
CHLORIDE BLD-SCNC: 96 MMOL/L (ref 98–107)
CO2: 29 MMOL/L (ref 20–31)
COLOR: YELLOW
COMMENT UA: NORMAL
CREAT SERPL-MCNC: 0.62 MG/DL (ref 0.5–0.9)
DIFFERENTIAL TYPE: ABNORMAL
EOSINOPHILS RELATIVE PERCENT: 2 % (ref 0–4)
GFR AFRICAN AMERICAN: >60 ML/MIN
GFR NON-AFRICAN AMERICAN: >60 ML/MIN
GFR SERPL CREATININE-BSD FRML MDRD: ABNORMAL ML/MIN/{1.73_M2}
GFR SERPL CREATININE-BSD FRML MDRD: ABNORMAL ML/MIN/{1.73_M2}
GLUCOSE BLD-MCNC: 135 MG/DL (ref 70–99)
GLUCOSE URINE: NEGATIVE
HCT VFR BLD CALC: 38.9 % (ref 36–46)
HEMOGLOBIN: 13 G/DL (ref 12–16)
IMMATURE GRANULOCYTES: ABNORMAL %
KETONES, URINE: NEGATIVE
LACTIC ACID: 1.6 MMOL/L (ref 0.5–2.2)
LEUKOCYTE ESTERASE, URINE: NEGATIVE
LIPASE: 24 U/L (ref 13–60)
LYMPHOCYTES # BLD: 18 % (ref 24–44)
MCH RBC QN AUTO: 31.8 PG (ref 26–34)
MCHC RBC AUTO-ENTMCNC: 33.5 G/DL (ref 31–37)
MCV RBC AUTO: 94.9 FL (ref 80–100)
MONOCYTES # BLD: 6 % (ref 1–7)
NITRITE, URINE: NEGATIVE
NRBC AUTOMATED: ABNORMAL PER 100 WBC
PDW BLD-RTO: 13.8 % (ref 11.5–14.9)
PH UA: 7.5 (ref 5–8)
PLATELET # BLD: 237 K/UL (ref 150–450)
PLATELET ESTIMATE: ABNORMAL
PMV BLD AUTO: 8.6 FL (ref 6–12)
POTASSIUM SERPL-SCNC: 4 MMOL/L (ref 3.7–5.3)
PROTEIN UA: NEGATIVE
RBC # BLD: 4.1 M/UL (ref 4–5.2)
RBC # BLD: ABNORMAL 10*6/UL
SARS-COV-2, RAPID: NOT DETECTED
SARS-COV-2: NORMAL
SARS-COV-2: NORMAL
SEG NEUTROPHILS: 73 % (ref 36–66)
SEGMENTED NEUTROPHILS ABSOLUTE COUNT: 7.9 K/UL (ref 1.3–9.1)
SODIUM BLD-SCNC: 138 MMOL/L (ref 135–144)
SOURCE: NORMAL
SPECIFIC GRAVITY UA: 1.03 (ref 1–1.03)
TOTAL PROTEIN: 7.3 G/DL (ref 6.4–8.3)
TROPONIN INTERP: NORMAL
TROPONIN T: NORMAL NG/ML
TROPONIN, HIGH SENSITIVITY: <6 NG/L (ref 0–14)
TURBIDITY: CLEAR
URINE HGB: NEGATIVE
UROBILINOGEN, URINE: NORMAL
WBC # BLD: 10.8 K/UL (ref 3.5–11)
WBC # BLD: ABNORMAL 10*3/UL

## 2020-10-16 PROCEDURE — 71045 X-RAY EXAM CHEST 1 VIEW: CPT

## 2020-10-16 PROCEDURE — 93005 ELECTROCARDIOGRAM TRACING: CPT | Performed by: EMERGENCY MEDICINE

## 2020-10-16 PROCEDURE — 2580000003 HC RX 258: Performed by: INTERNAL MEDICINE

## 2020-10-16 PROCEDURE — 6360000002 HC RX W HCPCS: Performed by: INTERNAL MEDICINE

## 2020-10-16 PROCEDURE — 6360000004 HC RX CONTRAST MEDICATION: Performed by: EMERGENCY MEDICINE

## 2020-10-16 PROCEDURE — 6360000002 HC RX W HCPCS: Performed by: EMERGENCY MEDICINE

## 2020-10-16 PROCEDURE — U0002 COVID-19 LAB TEST NON-CDC: HCPCS

## 2020-10-16 PROCEDURE — 80053 COMPREHEN METABOLIC PANEL: CPT

## 2020-10-16 PROCEDURE — 36415 COLL VENOUS BLD VENIPUNCTURE: CPT

## 2020-10-16 PROCEDURE — 83690 ASSAY OF LIPASE: CPT

## 2020-10-16 PROCEDURE — 96374 THER/PROPH/DIAG INJ IV PUSH: CPT

## 2020-10-16 PROCEDURE — 81003 URINALYSIS AUTO W/O SCOPE: CPT

## 2020-10-16 PROCEDURE — 1200000000 HC SEMI PRIVATE

## 2020-10-16 PROCEDURE — 83605 ASSAY OF LACTIC ACID: CPT

## 2020-10-16 PROCEDURE — 96375 TX/PRO/DX INJ NEW DRUG ADDON: CPT

## 2020-10-16 PROCEDURE — 71260 CT THORAX DX C+: CPT

## 2020-10-16 PROCEDURE — 74177 CT ABD & PELVIS W/CONTRAST: CPT

## 2020-10-16 PROCEDURE — 2580000003 HC RX 258: Performed by: EMERGENCY MEDICINE

## 2020-10-16 PROCEDURE — 84484 ASSAY OF TROPONIN QUANT: CPT

## 2020-10-16 PROCEDURE — 99285 EMERGENCY DEPT VISIT HI MDM: CPT

## 2020-10-16 PROCEDURE — 85025 COMPLETE CBC W/AUTO DIFF WBC: CPT

## 2020-10-16 RX ORDER — SODIUM CHLORIDE 0.9 % (FLUSH) 0.9 %
10 SYRINGE (ML) INJECTION EVERY 12 HOURS SCHEDULED
Status: DISCONTINUED | OUTPATIENT
Start: 2020-10-16 | End: 2020-10-19 | Stop reason: HOSPADM

## 2020-10-16 RX ORDER — SODIUM CHLORIDE 0.9 % (FLUSH) 0.9 %
10 SYRINGE (ML) INJECTION PRN
Status: DISCONTINUED | OUTPATIENT
Start: 2020-10-16 | End: 2020-10-19 | Stop reason: HOSPADM

## 2020-10-16 RX ORDER — ACETAMINOPHEN 325 MG/1
650 TABLET ORAL EVERY 4 HOURS PRN
Status: DISCONTINUED | OUTPATIENT
Start: 2020-10-16 | End: 2020-10-19 | Stop reason: HOSPADM

## 2020-10-16 RX ORDER — MORPHINE SULFATE 2 MG/ML
2 INJECTION, SOLUTION INTRAMUSCULAR; INTRAVENOUS
Status: DISCONTINUED | OUTPATIENT
Start: 2020-10-16 | End: 2020-10-19 | Stop reason: HOSPADM

## 2020-10-16 RX ORDER — MORPHINE SULFATE 4 MG/ML
4 INJECTION, SOLUTION INTRAMUSCULAR; INTRAVENOUS
Status: DISCONTINUED | OUTPATIENT
Start: 2020-10-16 | End: 2020-10-19 | Stop reason: HOSPADM

## 2020-10-16 RX ORDER — ONDANSETRON 2 MG/ML
4 INJECTION INTRAMUSCULAR; INTRAVENOUS EVERY 8 HOURS PRN
Status: DISCONTINUED | OUTPATIENT
Start: 2020-10-16 | End: 2020-10-19 | Stop reason: HOSPADM

## 2020-10-16 RX ORDER — SODIUM CHLORIDE 0.9 % (FLUSH) 0.9 %
10 SYRINGE (ML) INJECTION ONCE
Status: COMPLETED | OUTPATIENT
Start: 2020-10-16 | End: 2020-10-16

## 2020-10-16 RX ORDER — 0.9 % SODIUM CHLORIDE 0.9 %
80 INTRAVENOUS SOLUTION INTRAVENOUS ONCE
Status: COMPLETED | OUTPATIENT
Start: 2020-10-16 | End: 2020-10-16

## 2020-10-16 RX ORDER — SODIUM CHLORIDE 9 MG/ML
INJECTION, SOLUTION INTRAVENOUS CONTINUOUS
Status: DISCONTINUED | OUTPATIENT
Start: 2020-10-16 | End: 2020-10-19 | Stop reason: HOSPADM

## 2020-10-16 RX ORDER — ASCORBIC ACID 500 MG
1000 TABLET ORAL 2 TIMES DAILY
COMMUNITY

## 2020-10-16 RX ORDER — ONDANSETRON 2 MG/ML
4 INJECTION INTRAMUSCULAR; INTRAVENOUS ONCE
Status: COMPLETED | OUTPATIENT
Start: 2020-10-16 | End: 2020-10-16

## 2020-10-16 RX ORDER — LIDOCAINE HYDROCHLORIDE 20 MG/ML
JELLY TOPICAL ONCE
Status: DISCONTINUED | OUTPATIENT
Start: 2020-10-16 | End: 2020-10-19 | Stop reason: HOSPADM

## 2020-10-16 RX ORDER — OXYMETAZOLINE HYDROCHLORIDE 0.05 G/100ML
2 SPRAY NASAL ONCE
Status: DISCONTINUED | OUTPATIENT
Start: 2020-10-16 | End: 2020-10-19 | Stop reason: HOSPADM

## 2020-10-16 RX ORDER — 0.9 % SODIUM CHLORIDE 0.9 %
1000 INTRAVENOUS SOLUTION INTRAVENOUS ONCE
Status: COMPLETED | OUTPATIENT
Start: 2020-10-16 | End: 2020-10-16

## 2020-10-16 RX ADMIN — Medication 10 ML: at 18:45

## 2020-10-16 RX ADMIN — Medication 10 ML: at 23:27

## 2020-10-16 RX ADMIN — SODIUM CHLORIDE 1000 ML: 9 INJECTION, SOLUTION INTRAVENOUS at 17:48

## 2020-10-16 RX ADMIN — MORPHINE SULFATE 2 MG: 2 INJECTION, SOLUTION INTRAMUSCULAR; INTRAVENOUS at 23:27

## 2020-10-16 RX ADMIN — HYDROMORPHONE HYDROCHLORIDE 1 MG: 1 INJECTION, SOLUTION INTRAMUSCULAR; INTRAVENOUS; SUBCUTANEOUS at 17:53

## 2020-10-16 RX ADMIN — SODIUM CHLORIDE 80 ML: 9 INJECTION, SOLUTION INTRAVENOUS at 18:45

## 2020-10-16 RX ADMIN — IOPAMIDOL 75 ML: 755 INJECTION, SOLUTION INTRAVENOUS at 18:45

## 2020-10-16 RX ADMIN — ONDANSETRON 4 MG: 2 INJECTION INTRAMUSCULAR; INTRAVENOUS at 17:52

## 2020-10-16 ASSESSMENT — ENCOUNTER SYMPTOMS
VOMITING: 1
NAUSEA: 1
ABDOMINAL PAIN: 1
DIARRHEA: 1
BACK PAIN: 1
COUGH: 0

## 2020-10-16 ASSESSMENT — PAIN SCALES - GENERAL
PAINLEVEL_OUTOF10: 8
PAINLEVEL_OUTOF10: 3
PAINLEVEL_OUTOF10: 8
PAINLEVEL_OUTOF10: 8
PAINLEVEL_OUTOF10: 0
PAINLEVEL_OUTOF10: 8

## 2020-10-16 ASSESSMENT — PAIN DESCRIPTION - DESCRIPTORS
DESCRIPTORS: PRESSURE
DESCRIPTORS: CRAMPING;SHARP

## 2020-10-16 ASSESSMENT — PAIN DESCRIPTION - LOCATION
LOCATION: ABDOMEN
LOCATION: ABDOMEN
LOCATION: ABDOMEN;HEAD
LOCATION: ABDOMEN

## 2020-10-16 ASSESSMENT — PAIN DESCRIPTION - PAIN TYPE
TYPE: ACUTE PAIN

## 2020-10-16 ASSESSMENT — PAIN DESCRIPTION - FREQUENCY: FREQUENCY: INTERMITTENT

## 2020-10-16 ASSESSMENT — PAIN DESCRIPTION - ORIENTATION: ORIENTATION: MID

## 2020-10-16 ASSESSMENT — PAIN - FUNCTIONAL ASSESSMENT: PAIN_FUNCTIONAL_ASSESSMENT: PREVENTS OR INTERFERES SOME ACTIVE ACTIVITIES AND ADLS

## 2020-10-16 ASSESSMENT — PAIN DESCRIPTION - ONSET
ONSET: SUDDEN
ONSET: SUDDEN

## 2020-10-16 NOTE — ED PROVIDER NOTES
 COLON SURGERY      COLONOSCOPY      DILATION AND CURETTAGE OF UTERUS      FOOT SURGERY Right     HAND SURGERY Right 6/18/14    OVARY REMOVAL Bilateral     WRIST ARTHROPLASTY Left        CURRENT MEDICATIONS       Current Discharge Medication List      CONTINUE these medications which have NOT CHANGED    Details   vitamin C (ASCORBIC ACID) 500 MG tablet Take 1,000 mg by mouth 2 times daily      traZODone (DESYREL) 100 MG tablet Take 1 tablet by mouth once daily  Qty: 30 tablet, Refills: 0    Associated Diagnoses: Strain of lumbar region, sequela; Displacement of lumbar intervertebral disc without myelopathy; Sacrum sprain, sequela; Displacement of intervertebral disc of lumbar region; Sprain of ligament of lumbosacral joint, sequela      FLUoxetine (PROZAC) 20 MG capsule Take 1 capsule by mouth BID  Qty: 60 capsule, Refills: 5    Associated Diagnoses: Generalized anxiety disorder      Calcium Carbonate-Vitamin D (OSCAL 500/200 D-3 PO) Take 500 mg by mouth daily      aspirin (ASPIRIN CHILDRENS) 81 MG chewable tablet Take 1 tablet by mouth daily  Qty: 30 tablet, Refills: 0             ALLERGIES     is allergic to Jose Armando Schein tartrate]; ativan [lorazepam]; darvon [propoxyphene]; restoril [temazepam]; and topamax [topiramate]. FAMILY HISTORY     She indicated that the status of her father is unknown. SOCIAL HISTORY      reports that she quit smoking about 13 months ago. Her smoking use included cigarettes. She has a 3.75 pack-year smoking history. She has never used smokeless tobacco. She reports current alcohol use of about 2.0 standard drinks of alcohol per week. She reports that she does not use drugs.     PHYSICAL EXAM     INITIAL VITALS: BP (!) 119/50   Pulse 69   Temp 98.8 °F (37.1 °C)   Resp 16   Ht 5' 7\" (1.702 m)   Wt 194 lb 3.6 oz (88.1 kg)   SpO2 91%   BMI 30.42 kg/m²   Gen: appears uncomfortable  Head: Normocephalic, atraumatic  Eye: Pupils equal round reactive to light, no conjunctivitis  ENT: Dry oral mucosa  Neck: no JVD  Heart: Regular rate and rhythm no murmurs  Lungs: Clear to auscultation bilaterally, no respiratory distress  Chest wall: No crepitus, no tenderness palpation  Abdomen: Soft, umbilcial and RLQ TTP with guarding, nondistended, with no peritoneal signs  MSK: No cva ttp  Neurologic: Patient is alert and oriented x3, fluent speech  Extremities: no edema. MEDICAL DECISION MAKING:     MDM  72 y.o. female presenting with abdominal pain. Differential diagnosis is appendicitis, cholecystitis, choledoclithiasis, pancreatitis, kidney stones, diverticulitis, bowel perforation, bowel obstruction, abdominal mass, aortic dissection,Checking cbc, cmp, lactic acid, lipase, and ct abdomen and pelvis. Pt provided treatment with zofran, IVF and dilaudid. We will monitor and reassess. Emergency Department course:    ED Course as of Oct 18 0934   Fri Oct 16, 2020   1821 Pt hypoxic in the 80's on RA (84%). Put on NC O2. We'll r/o a PE evaluate for pna. EKG and troponin. [KW]   1938 CT abdomen shows an obstruction. Consulting general surgery      [KW]      ED Course User Index  [KW] Liseth Gillespie MD     [id number]  Spoke with Dr. Emery Perez, he asks that we place an NGT, IVF, NPO, admit to medicine. Pt updated and in agreement with plan. DIAGNOSTIC RESULTS     EKG: All EKG's are interpreted by the Emergency Department Physician who either signs or Co-signs this chart in the absence of a cardiologist.    EKG shows a sinus rhythm. HR is 69, , QRS 86, , no RAULITO, No STD, No TWI, the axis is normal.      RADIOLOGY:All plain film, CT, MRI, and formal ultrasound images (except ED bedside ultrasound) are read by the radiologist and the images and interpretations are directly viewed by the emergency physician. FL SMALL BOWEL FOLLOW THROUGH ONLY   Final Result   Unremarkable small bowel follow through series.          XR ABDOMEN (KUB) (SINGLE AP VIEW)   Final Result   No significant change in abdominal findings compared to the prior CT study   perform 16 hours earlier. Prominent mid small bowel loop. Finding could   represent ileus or possibly of developing obstruction. XR CHEST PORTABLE   Final Result   Enteric tube in expected position. CT CHEST PULMONARY EMBOLISM W CONTRAST   Final Result   No evidence of pulmonary embolism or acute pulmonary abnormality. Patient   has chronic pulmonary changes with septal thickening in the lower lobes   without evidence of acute consolidation. CT ABDOMEN PELVIS W IV CONTRAST Additional Contrast? None   Final Result   1. Dilated small bowel loops in the mid abdomen, consistent with obstruction. There is decompressed bowel both above and below, concerning for closed loop   obstruction. Clinical correlation is recommended. 2. Mild biliary ductal and pancreatic ductal dilation without obstructing   stone or lesion identified. LABS: All lab results were reviewed by myself, and all abnormals are listed below.   Labs Reviewed   CBC WITH AUTO DIFFERENTIAL - Abnormal; Notable for the following components:       Result Value    Seg Neutrophils 73 (*)     Lymphocytes 18 (*)     All other components within normal limits   COMPREHENSIVE METABOLIC PANEL - Abnormal; Notable for the following components:    Glucose 135 (*)     Chloride 96 (*)     Total Bilirubin 0.24 (*)     All other components within normal limits   CBC WITH AUTO DIFFERENTIAL - Abnormal; Notable for the following components:    RBC 3.56 (*)     Hemoglobin 11.0 (*)     Hematocrit 34.1 (*)     Monocytes 8 (*)     All other components within normal limits   BASIC METABOLIC PANEL - Abnormal; Notable for the following components:    Anion Gap 7 (*)     All other components within normal limits   CBC WITH AUTO DIFFERENTIAL - Abnormal; Notable for the following components:    RBC 3.44 (*)     Hemoglobin 10.8 (*)     Hematocrit 33.0 (*) Monocytes 9 (*)     All other components within normal limits   BASIC METABOLIC PANEL - Abnormal; Notable for the following components:    Potassium 3.5 (*)     All other components within normal limits   HEMOGLOBIN AND HEMATOCRIT, BLOOD - Abnormal; Notable for the following components:    Hemoglobin 11.0 (*)     Hematocrit 33.1 (*)     All other components within normal limits   LIPASE   LACTIC ACID   URINE RT REFLEX TO CULTURE   TROPONIN   COVID-19   HEMOGLOBIN AND HEMATOCRIT, BLOOD   HEMOGLOBIN AND HEMATOCRIT, BLOOD       EMERGENCY DEPARTMENT COURSE:   Vitals:    Vitals:    10/18/20 0500 10/18/20 0600 10/18/20 0800 10/18/20 0900   BP: (!) 105/43 (!) 127/48 (!) 129/50 (!) 119/50   Pulse: 65 68 72 69   Resp: 14 14 16 16   Temp:   98.8 °F (37.1 °C)    TempSrc:       SpO2: 92% 92% 93% 91%   Weight:       Height:           The patient was given the following medications while in the emergency department:  Orders Placed This Encounter   Medications    0.9 % sodium chloride bolus    ondansetron (ZOFRAN) injection 4 mg    HYDROmorphone (DILAUDID) injection 1 mg    0.9 % sodium chloride bolus    sodium chloride flush 0.9 % injection 10 mL    iopamidol (ISOVUE-370) 76 % injection 75 mL    oxymetazoline (AFRIN) 0.05 % nasal spray 2 spray    lidocaine (XYLOCAINE) 2 % jelly    0.9 % sodium chloride infusion    sodium chloride flush 0.9 % injection 10 mL    sodium chloride flush 0.9 % injection 10 mL    acetaminophen (TYLENOL) tablet 650 mg    DISCONTD: enoxaparin (LOVENOX) injection 40 mg    ondansetron (ZOFRAN) injection 4 mg    OR Linked Order Group     morphine (PF) injection 2 mg     morphine sulfate (PF) injection 4 mg    influenza quadrivalent split vaccine (FLUZONE;FLUARIX;FLULAVAL;AFLURIA) injection 0.5 mL    FLUoxetine (PROZAC) capsule 20 mg    traZODone (DESYREL) tablet 100 mg    diatrizoate meglumine-sodium (GASTROGRAFIN) 66-10 % solution 300 mL     -------------------------  CRITICAL CARE:

## 2020-10-16 NOTE — ED NOTES
Mode of arrival:  Spouse drove pt in      Residence prior to admit: home      Chief complaint on admission: abdominal pain, emesis, diarrhea, nausea  Pt states that complaints started around 1400 today. Pt states that she did have a colectomy about 20 years ago from a bowel obstruction. Pt has had 3 episodes of emesis while in ER and has had multiple episodes of diarrhea. Pt is A&Ox4, ambulates with assistive devices at times, and does not appear to be in any distress at this time. Pt does admit to having memory issues and her  can fill in the details.  at bedside. C= \"Have you ever felt that you should Cut down on your drinking? \"  No  A= \"Have people Annoyed you by criticizing your drinking? \"  No  G= \"Have you ever felt bad or Guilty about your drinking? \"  No  E= \"Have you ever had a drink as an Eye-opener first thing in the morning to steady your nerves or to help a hangover? \"  No      Deferred []      Reason for deferring: N/A    *If yes to two or more: probable alcohol abuse. [id number] [facility], RN  10/16/20 [id number]

## 2020-10-16 NOTE — ED NOTES
RN went into pt's room to update pt and spouse on resulted blood work. Pt's O2 sats on room air was in the mid to high 80s, lowest at 85%. Pt did take a deep breath and O2 sat only came up to 91% and O2 sats decreased back down to high 80s. Pt was alert, talkative, and appeared to be in no distress. O2 applied at 2L per nasal cannula to pt. Dr Smooth Cope notified of status changes and orders were placed.        Varghese Noel RN  10/16/20 2597

## 2020-10-17 ENCOUNTER — APPOINTMENT (OUTPATIENT)
Dept: GENERAL RADIOLOGY | Age: 65
DRG: 390 | End: 2020-10-17
Payer: MEDICARE

## 2020-10-17 LAB
ABSOLUTE EOS #: 0.3 K/UL (ref 0–0.4)
ABSOLUTE IMMATURE GRANULOCYTE: ABNORMAL K/UL (ref 0–0.3)
ABSOLUTE LYMPH #: 2 K/UL (ref 1–4.8)
ABSOLUTE MONO #: 0.6 K/UL (ref 0.1–1.3)
ANION GAP SERPL CALCULATED.3IONS-SCNC: 7 MMOL/L (ref 9–17)
BASOPHILS # BLD: 1 % (ref 0–2)
BASOPHILS ABSOLUTE: 0 K/UL (ref 0–0.2)
BUN BLDV-MCNC: 11 MG/DL (ref 8–23)
BUN/CREAT BLD: ABNORMAL (ref 9–20)
CALCIUM SERPL-MCNC: 8.8 MG/DL (ref 8.6–10.4)
CHLORIDE BLD-SCNC: 104 MMOL/L (ref 98–107)
CO2: 28 MMOL/L (ref 20–31)
CREAT SERPL-MCNC: 0.52 MG/DL (ref 0.5–0.9)
DIFFERENTIAL TYPE: ABNORMAL
EKG ATRIAL RATE: 69 BPM
EKG P AXIS: 9 DEGREES
EKG P-R INTERVAL: 156 MS
EKG Q-T INTERVAL: 424 MS
EKG QRS DURATION: 86 MS
EKG QTC CALCULATION (BAZETT): 454 MS
EKG R AXIS: 28 DEGREES
EKG T AXIS: 9 DEGREES
EKG VENTRICULAR RATE: 69 BPM
EOSINOPHILS RELATIVE PERCENT: 4 % (ref 0–4)
GFR AFRICAN AMERICAN: >60 ML/MIN
GFR NON-AFRICAN AMERICAN: >60 ML/MIN
GFR SERPL CREATININE-BSD FRML MDRD: ABNORMAL ML/MIN/{1.73_M2}
GFR SERPL CREATININE-BSD FRML MDRD: ABNORMAL ML/MIN/{1.73_M2}
GLUCOSE BLD-MCNC: 90 MG/DL (ref 70–99)
HCT VFR BLD CALC: 34.1 % (ref 36–46)
HEMOGLOBIN: 11 G/DL (ref 12–16)
IMMATURE GRANULOCYTES: ABNORMAL %
LYMPHOCYTES # BLD: 28 % (ref 24–44)
MCH RBC QN AUTO: 30.9 PG (ref 26–34)
MCHC RBC AUTO-ENTMCNC: 32.3 G/DL (ref 31–37)
MCV RBC AUTO: 95.6 FL (ref 80–100)
MONOCYTES # BLD: 8 % (ref 1–7)
NRBC AUTOMATED: ABNORMAL PER 100 WBC
PDW BLD-RTO: 14.1 % (ref 11.5–14.9)
PLATELET # BLD: 209 K/UL (ref 150–450)
PLATELET ESTIMATE: ABNORMAL
PMV BLD AUTO: 8.6 FL (ref 6–12)
POTASSIUM SERPL-SCNC: 4 MMOL/L (ref 3.7–5.3)
RBC # BLD: 3.56 M/UL (ref 4–5.2)
RBC # BLD: ABNORMAL 10*6/UL
SEG NEUTROPHILS: 59 % (ref 36–66)
SEGMENTED NEUTROPHILS ABSOLUTE COUNT: 4.2 K/UL (ref 1.3–9.1)
SODIUM BLD-SCNC: 139 MMOL/L (ref 135–144)
WBC # BLD: 7 K/UL (ref 3.5–11)
WBC # BLD: ABNORMAL 10*3/UL

## 2020-10-17 PROCEDURE — 1200000000 HC SEMI PRIVATE

## 2020-10-17 PROCEDURE — 74250 X-RAY XM SM INT 1CNTRST STD: CPT

## 2020-10-17 PROCEDURE — 2580000003 HC RX 258: Performed by: INTERNAL MEDICINE

## 2020-10-17 PROCEDURE — 93010 ELECTROCARDIOGRAM REPORT: CPT | Performed by: INTERNAL MEDICINE

## 2020-10-17 PROCEDURE — 99223 1ST HOSP IP/OBS HIGH 75: CPT | Performed by: INTERNAL MEDICINE

## 2020-10-17 PROCEDURE — 36415 COLL VENOUS BLD VENIPUNCTURE: CPT

## 2020-10-17 PROCEDURE — 6370000000 HC RX 637 (ALT 250 FOR IP): Performed by: INTERNAL MEDICINE

## 2020-10-17 PROCEDURE — 6360000004 HC RX CONTRAST MEDICATION: Performed by: SURGERY

## 2020-10-17 PROCEDURE — 85025 COMPLETE CBC W/AUTO DIFF WBC: CPT

## 2020-10-17 PROCEDURE — 6360000002 HC RX W HCPCS: Performed by: INTERNAL MEDICINE

## 2020-10-17 PROCEDURE — 80048 BASIC METABOLIC PNL TOTAL CA: CPT

## 2020-10-17 PROCEDURE — 74018 RADEX ABDOMEN 1 VIEW: CPT

## 2020-10-17 RX ORDER — TRAZODONE HYDROCHLORIDE 100 MG/1
100 TABLET ORAL NIGHTLY
Status: DISCONTINUED | OUTPATIENT
Start: 2020-10-17 | End: 2020-10-19 | Stop reason: HOSPADM

## 2020-10-17 RX ORDER — FLUOXETINE HYDROCHLORIDE 20 MG/1
20 CAPSULE ORAL DAILY
Status: DISCONTINUED | OUTPATIENT
Start: 2020-10-17 | End: 2020-10-19 | Stop reason: HOSPADM

## 2020-10-17 RX ADMIN — SODIUM CHLORIDE: 9 INJECTION, SOLUTION INTRAVENOUS at 01:00

## 2020-10-17 RX ADMIN — FLUOXETINE HYDROCHLORIDE 20 MG: 20 CAPSULE ORAL at 20:49

## 2020-10-17 RX ADMIN — TRAZODONE HYDROCHLORIDE 100 MG: 100 TABLET ORAL at 20:49

## 2020-10-17 RX ADMIN — MORPHINE SULFATE 2 MG: 2 INJECTION, SOLUTION INTRAMUSCULAR; INTRAVENOUS at 20:50

## 2020-10-17 RX ADMIN — ENOXAPARIN SODIUM 40 MG: 40 INJECTION SUBCUTANEOUS at 08:35

## 2020-10-17 RX ADMIN — MORPHINE SULFATE 4 MG: 4 INJECTION, SOLUTION INTRAMUSCULAR; INTRAVENOUS at 08:36

## 2020-10-17 RX ADMIN — DIATRIZOATE MEGLUMINE AND DIATRIZOATE SODIUM 300 ML: 600; 100 SOLUTION ORAL; RECTAL at 17:08

## 2020-10-17 RX ADMIN — MORPHINE SULFATE 2 MG: 2 INJECTION, SOLUTION INTRAMUSCULAR; INTRAVENOUS at 16:52

## 2020-10-17 ASSESSMENT — PAIN SCALES - GENERAL
PAINLEVEL_OUTOF10: 4
PAINLEVEL_OUTOF10: 6
PAINLEVEL_OUTOF10: 9
PAINLEVEL_OUTOF10: 6
PAINLEVEL_OUTOF10: 5

## 2020-10-17 ASSESSMENT — PAIN DESCRIPTION - LOCATION
LOCATION: ABDOMEN
LOCATION: ABDOMEN
LOCATION: BACK;ABDOMEN
LOCATION: ABDOMEN

## 2020-10-17 ASSESSMENT — PAIN DESCRIPTION - PAIN TYPE
TYPE: ACUTE PAIN
TYPE: ACUTE PAIN

## 2020-10-17 ASSESSMENT — PAIN DESCRIPTION - ORIENTATION
ORIENTATION: MID
ORIENTATION: MID

## 2020-10-17 ASSESSMENT — PAIN DESCRIPTION - DESCRIPTORS
DESCRIPTORS: CRAMPING
DESCRIPTORS: SHARP

## 2020-10-17 NOTE — H&P
History and Physical Service  [facility] Internal Medicine    HISTORY AND PHYSICAL EXAMINATION            Date:   10/17/2020  Patient name:  Viji Kamara  MRN:   [id number]  Account:  [de-identified]  YOB: 1955  PCP:    Estefani Sears MD  Code Status:    Full Code    Chief Complaint:     Nausea vomiting abdominal pain    History Obtained From:     patient    History of Present Illness: The patient is a 72 y.o. Non-/non  female who presents    80-year-old lady with history of depression arthritis diverticulitis and bowel surgery  Admitted with 1 day history of nausea vomiting abdominal distention not having bowel movements or passing gas admitted and diagnosed with a small bowel obstruction CT scan noted this morning patient had 2 bowel movements NG tube is in place minimal NG drainage       Past Medical History:     Past Medical History:   Diagnosis Date    Arthritis     Depression     Displacement of intervertebral disc, site unspecified, without myelopathy     [facility]    Displacement of lumbar intervertebral disc without myelopathy     [facility]    Hyperlipidemia     Lumbago     [facility]    Sprain of lumbar region     [facility]    Sprain of lumbosacral (joint) (ligament)     [facility]    Sprain of sacrum     [facility]        Past Surgical History:     Past Surgical History:   Procedure Laterality Date    ABDOMINAL EXPLORATION SURGERY      BACK SURGERY      X2    COLON SURGERY      COLONOSCOPY      DILATION AND CURETTAGE OF UTERUS      FOOT SURGERY Right     HAND SURGERY Right 6/18/14    OVARY REMOVAL Bilateral     WRIST ARTHROPLASTY Left         Medications Prior to Admission:     Prior to Admission medications    Medication Sig Start Date End Date Taking?  Authorizing Provider   vitamin C (ASCORBIC ACID) 500 MG tablet Take 1,000 mg by mouth 2 times daily   Yes Historical Provider, MD   traZODone (DESYREL) 100 MG tablet Take 1 tablet by mouth once daily 8/26/20  Yes Janine TIMMONS DO Jhonny   FLUoxetine (PROZAC) 20 MG capsule Take 1 capsule by mouth BID 7/15/20  Yes Robert Suárez MD   simvastatin (ZOCOR) 40 MG tablet TAKE 1 TABLET BY MOUTH  EVERY NIGHT AT BEDTIME 12/16/19  Yes Robert Suárez MD   Calcium Carbonate-Vitamin D (OSCAL 500/200 D-3 PO) Take 500 mg by mouth daily   Yes Historical Provider, MD   aspirin (ASPIRIN CHILDRENS) 81 MG chewable tablet Take 1 tablet by mouth daily 4/5/17   Kaylee Barillas MD        Allergies:     Ambien [zolpidem tartrate]; Ativan [lorazepam]; Darvon [propoxyphene]; Restoril [temazepam]; and Topamax [topiramate]    Social History:     Tobacco:    reports that she quit smoking about 13 months ago. Her smoking use included cigarettes. She has a 3.75 pack-year smoking history. She has never used smokeless tobacco.  Alcohol:      reports current alcohol use of about 2.0 standard drinks of alcohol per week. Drug Use:  reports no history of drug use. Family History:     Family History   Problem Relation Age of Onset    Heart Disease Father     High Blood Pressure Father        Review of Systems:     Positive and Negative as described in HPI. CONSTITUTIONAL:  negative for fevers, chills, sweats, fatigue, weight loss  HEENT:  negative for vision, hearing changes, runny nose, throat pain  RESPIRATORY:  negative for shortness of breath, cough, congestion, wheezing. CARDIOVASCULAR:  negative for chest pain, palpitations.   GASTROINTESTINAL:  n positive for nausea vomiting and no bowel movements  GENITOURINARY:  negative for difficulty of urination, burning with urination, frequency   INTEGUMENT:  negative for rash, skin lesions, easy bruising   HEMATOLOGIC/LYMPHATIC:  negative for swelling/edema   ALLERGIC/IMMUNOLOGIC:  negative for urticaria , itching  ENDOCRINE:  negative increase in drinking, increase in urination, hot or cold intolerance  MUSCULOSKELETAL:  negative joint pains, muscle aches, swelling of joints  NEUROLOGICAL: negative for headaches, dizziness, lightheadedness, numbness, pain, tingling extremities  BEHAVIOR/PSYCH:  negative for depression, anxiety    Physical Exam:   BP (!) 125/49   Pulse 64   Temp 98.2 °F (36.8 °C) (Oral)   Resp 17   Ht 5' 7\" (1.702 m)   Wt 198 lb (89.8 kg)   SpO2 98%   BMI 31.01 kg/m²   No results for input(s): POCGLU in the last 72 hours. NG tube in nose  General Appearance:  alert, well appearing, and in no acute distress  Mental status: oriented to person, place, and time with normal affect  Head:  normocephalic, atraumatic. Eye: no icterus, redness, pupils equal and reactive, extraocular eye movements intact, conjunctiva clear  Ear: normal external ear, no discharge, hearing intact  Nose:  no drainage noted  Mouth: mucous membranes moist  Neck: supple, no carotid bruits, thyroid not palpable  Lungs: Bilateral equal air entry, clear to ausculation, no wheezing, rales or rhonchi, normal effort  Cardiovascular: normal rate, regular rhythm, no murmur, gallop, rub.   Abdomen: Soft no tenderness no guarding bowel sounds heard neurologic: There are no new focal motor or sensory deficits, normal muscle tone and bulk, no abnormal sensation, normal speech, cranial nerves II through XII grossly intact  Skin: No gross lesions, rashes, bruising or bleeding on exposed skin area  Extremities:  peripheral pulses palpable, no pedal edema or calf pain with palpation  Psych: normal affect     Investigations:      Laboratory Testing:  Recent Results (from the past 24 hour(s))   CBC with DIFF    Collection Time: 10/16/20  5:42 PM   Result Value Ref Range    WBC 10.8 3.5 - 11.0 k/uL    RBC 4.10 4.0 - 5.2 m/uL    Hemoglobin 13.0 12.0 - 16.0 g/dL    Hematocrit 38.9 36 - 46 %    MCV 94.9 80 - 100 fL    MCH 31.8 26 - 34 pg    MCHC 33.5 31 - 37 g/dL    RDW 13.8 11.5 - 14.9 %    Platelets 542 318 - 164 k/uL    MPV 8.6 6.0 - 12.0 fL    NRBC Automated NOT REPORTED per 100 WBC    Differential Type NOT REPORTED     Seg Neutrophils 73 (H) 36 - 66 %    Lymphocytes 18 (L) 24 - 44 %    Monocytes 6 1 - 7 %    Eosinophils % 2 0 - 4 %    Basophils 1 0 - 2 %    Immature Granulocytes NOT REPORTED 0 %    Segs Absolute 7.90 1.3 - 9.1 k/uL    Absolute Lymph # 2.00 1.0 - 4.8 k/uL    Absolute Mono # 0.70 0.1 - 1.3 k/uL    Absolute Eos # 0.20 0.0 - 0.4 k/uL    Basophils Absolute 0.10 0.0 - 0.2 k/uL    Absolute Immature Granulocyte NOT REPORTED 0.00 - 0.30 k/uL    WBC Morphology NOT REPORTED     RBC Morphology NOT REPORTED     Platelet Estimate NOT REPORTED    Comprehensive Metabolic Panel    Collection Time: 10/16/20  5:42 PM   Result Value Ref Range    Glucose 135 (H) 70 - 99 mg/dL    BUN 17 8 - 23 mg/dL    CREATININE 0.62 0.50 - 0.90 mg/dL    Bun/Cre Ratio NOT REPORTED 9 - 20    Calcium 9.8 8.6 - 10.4 mg/dL    Sodium 138 135 - 144 mmol/L    Potassium 4.0 3.7 - 5.3 mmol/L    Chloride 96 (L) 98 - 107 mmol/L    CO2 29 20 - 31 mmol/L    Anion Gap 13 9 - 17 mmol/L    Alkaline Phosphatase 70 35 - 104 U/L    ALT 12 5 - 33 U/L    AST 19 <32 U/L    Total Bilirubin 0.24 (L) 0.3 - 1.2 mg/dL    Total Protein 7.3 6.4 - 8.3 g/dL    Alb 4.1 3.5 - 5.2 g/dL    Albumin/Globulin Ratio NOT REPORTED 1.0 - 2.5    GFR Non-African American >60 >60 mL/min    GFR African American >60 >60 mL/min    GFR Comment          GFR Staging NOT REPORTED    Lipase    Collection Time: 10/16/20  5:42 PM   Result Value Ref Range    Lipase 24 13 - 60 U/L   Lactic Acid    Collection Time: 10/16/20  5:42 PM   Result Value Ref Range    Lactic Acid 1.6 0.5 - 2.2 mmol/L   Troponin    Collection Time: 10/16/20  5:42 PM   Result Value Ref Range    Troponin, High Sensitivity <6 0 - 14 ng/L    Troponin T NOT REPORTED <0.03 ng/mL    Troponin Interp NOT REPORTED    EKG 12 Lead    Collection Time: 10/16/20  6:50 PM   Result Value Ref Range    Ventricular Rate 69 BPM    Atrial Rate 69 BPM    P-R Interval 156 ms    QRS Duration 86 ms    Q-T Interval 424 ms    QTc Calculation (Bazett) 454 ms    P Axis 9 degrees    R Axis 28 degrees    T Axis 9 degrees   Urinalysis Reflex to Culture    Collection Time: 10/16/20  7:16 PM    Specimen: Urine, clean catch   Result Value Ref Range    Color, UA YELLOW YELLOW    Turbidity UA CLEAR CLEAR    Glucose, Ur NEGATIVE NEGATIVE    Bilirubin Urine NEGATIVE NEGATIVE    Ketones, Urine NEGATIVE NEGATIVE    Specific Gravity, UA 1.027 1.000 - 1.030    Urine Hgb NEGATIVE NEGATIVE    pH, UA 7.5 5.0 - 8.0    Protein, UA NEGATIVE NEGATIVE    Urobilinogen, Urine Normal Normal    Nitrite, Urine NEGATIVE NEGATIVE    Leukocyte Esterase, Urine NEGATIVE NEGATIVE    Urinalysis Comments       Microscopic exam not performed based on chemical results unless requested in original order. COVID-19    Collection Time: 10/16/20  9:05 PM    Specimen: Other   Result Value Ref Range    SARS-CoV-2          SARS-CoV-2, Rapid Not Detected Not Detected    Source . NASOPHARYNGEAL SWAB     SARS-CoV-2             Recent Labs     10/16/20  1742   HGB 13.0   HCT 38.9   WBC 10.8   MCV 94.9      K 4.0   CL 96*   CO2 29   BUN 17   CREATININE 0.62   GLUCOSE 135*   AST 19   ALT 12   LABALBU 4.1       Hematology:  Recent Labs     10/16/20  1742   WBC 10.8   RBC 4.10   HGB 13.0   HCT 38.9   MCV 94.9   MCH 31.8   MCHC 33.5   RDW 13.8      MPV 8.6     Chemistry:  Recent Labs     10/16/20  1742      K 4.0   CL 96*   CO2 29   GLUCOSE 135*   BUN 17   CREATININE 0.62   ANIONGAP 13   LABGLOM >60   GFRAA >60   CALCIUM 9.8   TROPONINT NOT REPORTED     Recent Labs     10/16/20  1742   PROT 7.3   LABALBU 4.1   AST 19   ALT 12   ALKPHOS 70   BILITOT 0.24*   LIPASE 24       Imaging/Diagnostics:       Ct Abdomen Pelvis W Iv Contrast Additional Contrast? None    Result Date: 10/16/2020  EXAMINATION: CT OF THE ABDOMEN AND PELVIS WITH CONTRAST 10/16/2020 6:30 pm TECHNIQUE: CT of the abdomen and pelvis was performed with the administration of intravenous contrast. Multiplanar reformatted images are provided for review. Dose modulation, iterative reconstruction, and/or weight based adjustment of the mA/kV was utilized to reduce the radiation dose to as low as reasonably achievable. COMPARISON: 10/31/2018 HISTORY: ORDERING SYSTEM PROVIDED HISTORY: umbilical adominal pain TECHNOLOGIST PROVIDED HISTORY: umbilical adominal pain Reason for Exam: umbilical adominal pain; pt c/o abd pain, nausea, vomiting, and diarrhea Acuity: Acute Type of Exam: Initial FINDINGS: Lower Chest: There is patchy opacity within both lung bases. No pleural effusion. Organs: The liver is diffusely hypoattenuating. No acute abnormality within the spleen, gallbladder, or adrenal glands. There is mild dilation of the intra and extrahepatic bile ducts and pancreatic duct. No acute abnormality within the kidneys. GI/Bowel: The stomach is partially distended. There are dilated loops of small bowel within the mid abdomen. There is decompressed bowel both above and below this dilated segment. The colon is normal in caliber. Pelvis: Bladder is partially distended without vesicular stone. Uterus is present. Peritoneum/Retroperitoneum: No ascites or pneumoperitoneum. Abdominal aorta is normal in caliber. Bones/Soft Tissues: No acute osseous abnormality     1. Dilated small bowel loops in the mid abdomen, consistent with obstruction. There is decompressed bowel both above and below, concerning for closed loop obstruction. Clinical correlation is recommended. 2. Mild biliary ductal and pancreatic ductal dilation without obstructing stone or lesion identified.      Xr Chest Portable    Result Date: 10/16/2020  EXAMINATION: ONE XRAY VIEW OF THE CHEST 10/16/2020 9:20 pm COMPARISON: 09/28/2019 HISTORY: ORDERING SYSTEM PROVIDED HISTORY: NG tube placement TECHNOLOGIST PROVIDED HISTORY: NG tube placement Reason for Exam: NG tube placement Acuity: Unknown Type of Exam: Unknown Additional signs and symptoms: NG tube placement FINDINGS: Cardiomediastinal silhouette is unchanged in size. Interstitial prominence in both lung bases, left greater than right, similar to prior examination. No pleural effusion or pneumothorax. Enteric tube tip and side port are below the GE junction. Tube terminates in the proximal stomach. .     Enteric tube in expected position. Ct Chest Pulmonary Embolism W Contrast    Result Date: 10/16/2020  EXAMINATION: CTA OF THE CHEST 10/16/2020 6:30 pm TECHNIQUE: CTA of the chest was performed after the administration of intravenous contrast.  Multiplanar reformatted images are provided for review. MIP images are provided for review. Dose modulation, iterative reconstruction, and/or weight based adjustment of the mA/kV was utilized to reduce the radiation dose to as low as reasonably achievable. COMPARISON: None. HISTORY: ORDERING SYSTEM PROVIDED HISTORY: hypoxia TECHNOLOGIST PROVIDED HISTORY: hypoxia Reason for Exam: Hypoxia; pt had no chest complaints Acuity: Unknown Type of Exam: Unknown FINDINGS: Pulmonary Arteries: Pulmonary arteries are adequately opacified for evaluation. No evidence of intraluminal filling defect to suggest pulmonary embolism. Main pulmonary artery is normal in caliber. Mediastinum: No evidence of mediastinal lymphadenopathy. A shotty left suprahilar lymph node is present. The heart and pericardium demonstrate no acute abnormality. There is no acute abnormality of the thoracic aorta. Granulomatous changes are noted. Lungs/pleura: Moderate emphysematous changes are present in the lungs with diffuse subpleural reticular prominence and lower lobe septal thickening without evidence of consolidation or effusion. The tracheobronchial tree is patent. Upper Abdomen: Limited images of the upper abdomen are unremarkable. Soft Tissues/Bones: No acute bone or soft tissue abnormality. No evidence of pulmonary embolism or acute pulmonary abnormality.   Patient has chronic pulmonary changes with septal thickening in the lower lobes without evidence of acute consolidation. Impressions :      1. Active Problems:    Small bowel obstruction (HCC)  Resolved Problems:    * No resolved hospital problems. *        2.  has a past medical history of Arthritis, Depression, Displacement of intervertebral disc, site unspecified, without myelopathy, Displacement of lumbar intervertebral disc without myelopathy, Hyperlipidemia, Lumbago, Sprain of lumbar region, Sprain of lumbosacral (joint) (ligament), and Sprain of sacrum.      Plans:     1 small bowel obstruction with history of bowel surgeries before for diverticulitis  N.p.o. NG tube is in place  IV fluid resuscitation  Patient seen and examined with Dr. Savi Ruth surgeon  Patient is having bowel movements no significant drainage from the NG tube plan is to clamp the tube and have a liquid diet later today  Insomnia did not get her trazodone last night will restart home medication    Current Facility-Administered Medications   Medication Dose Route Frequency Provider Last Rate Last Dose    oxymetazoline (AFRIN) 0.05 % nasal spray 2 spray  2 spray Each Nostril Once Rian Rodriguez MD        lidocaine (XYLOCAINE) 2 % jelly   Topical Once Rian Rodriguez MD        0.9 % sodium chloride infusion   Intravenous Continuous Gonzalo Campbell  mL/hr at 10/17/20 0720      sodium chloride flush 0.9 % injection 10 mL  10 mL Intravenous 2 times per day Gonzalo Campbell MD   10 mL at 10/16/20 2327    sodium chloride flush 0.9 % injection 10 mL  10 mL Intravenous PRN Gonzalo Campbell MD        acetaminophen (TYLENOL) tablet 650 mg  650 mg Oral Q4H PRN Gonzalo Campbell MD        enoxaparin (LOVENOX) injection 40 mg  40 mg Subcutaneous Daily Gonzalo Campbell MD   40 mg at 10/17/20 0835    ondansetron (ZOFRAN) injection 4 mg  4 mg Intravenous Q8H PRN Gonzalo Campblel MD        morphine (PF) injection 2 mg  2 mg Intravenous Q2H PRN Gonzalo Campbell MD   2 mg at 10/16/20 2327    Or  morphine sulfate (PF) injection 4 mg  4 mg Intravenous Q2H PRN Adele Juan MD   4 mg at 10/17/20 0836    influenza quadrivalent split vaccine (FLUZONE;FLUARIX;FLULAVAL;AFLURIA) injection 0.5 mL  0.5 mL Intramuscular Once MD Aedle Joy MD  10/17/2020  10:47 AM

## 2020-10-17 NOTE — PROGRESS NOTES
Admitted to room 2044 from ED per Bed. Oriented to room and call light. Vitals and assessment completed, no distress noted.

## 2020-10-17 NOTE — ED NOTES
Report given to LIBERTY Hutson from [facility]. Admit to room 2044  Report method by phone   The following was reviewed with receiving RN:   Current vital signs:  BP (!) 118/59   Pulse 66   Temp 98.2 °F (36.8 °C) (Oral)   Resp 22   Ht 5' 7\" (1.702 m)   Wt 198 lb (89.8 kg)   SpO2 100%   BMI 31.01 kg/m²                MEWS Score: 2     Any medication or safety alerts were reviewed. Any pending diagnostics and notifications were also reviewed, as well as any safety concerns or issues, abnormal labs, abnormal imaging, and abnormal assessment findings. Questions were answered.             Mica New RN  10/16/20 2308

## 2020-10-17 NOTE — PLAN OF CARE
Problem: Pain:  Goal: Pain level will decrease  Description: Pain level will decrease  Outcome: Ongoing  Note: Pt pain is controlled with PRN pain medication as needed. Goal: Control of acute pain  Description: Control of acute pain  Outcome: Ongoing  Note: Pt pain is controlled with PRN pain medication as needed. Goal: Control of chronic pain  Description: Control of chronic pain  Outcome: Ongoing  Note: Pt pain is controlled with PRN pain medication as needed. Problem: Falls - Risk of:  Goal: Will remain free from falls  Description: Will remain free from falls  Outcome: Ongoing  Note: No falls this shift. Call light is within reach, side rails up x2, and bed in lowest position. Pt safety is maintained. Goal: Absence of physical injury  Description: Absence of physical injury  Outcome: Ongoing  Note: No injury this shift. Pt safety maintained.

## 2020-10-17 NOTE — CONSULTS
General Surgery Consult      Pt Name: Sangita Sparks  MRN: [id number]  YOB: 1955  Date of evaluation: 10/17/2020  Primary Care Physician: Parveen Carpio MD   Patient evaluated at the request of  Dr. Desai Body  Reason for evaluation: Abdominal pain    SUBJECTIVE:   History of Chief Complaint:    Sangita Sparks is a 72 y.o. female who presents with abdominal pain started yesterday. Patient had bunch of beans for dinner the night before. Had a normal bowel movement but soft yesterday. No diarrhea. No rectal bleeding. Patient stated I have done her colectomy many years ago for blockage. Never had a colonoscopy since the surgery. She has had 2 bowel movements last night and one this morning. NG tube output nothing significant. Feels better according to the patient. No distention no fever chills. Again she relates her pain related to the beans she had for dinner the night before. Emergency room work-up reviewed. Symptom onset has been acute for a time period of 2 PM yesterday. Severity is described as mild-moderate. Course of her symptoms over time is acute. Past Medical History   has a past medical history of Arthritis, Depression, Displacement of intervertebral disc, site unspecified, without myelopathy, Displacement of lumbar intervertebral disc without myelopathy, Hyperlipidemia, Lumbago, Sprain of lumbar region, Sprain of lumbosacral (joint) (ligament), and Sprain of sacrum. Past Surgical History   has a past surgical history that includes Ovary removal (Bilateral); Wrist Arthroplasty (Left); Dilation and curettage of uterus; Abdominal exploration surgery; Colon surgery; back surgery; Colonoscopy; Foot surgery (Right); and Hand surgery (Right, 6/18/14). Medications  Prior to Admission medications    Medication Sig Start Date End Date Taking?  Authorizing Provider   vitamin C (ASCORBIC ACID) 500 MG tablet Take 1,000 mg by mouth 2 times daily   Yes Historical Provider, MD   traZODone (DESYREL) 100 MG tablet Take 1 tablet by mouth once daily 8/26/20  Yes Janine Barry DO   FLUoxetine (PROZAC) 20 MG capsule Take 1 capsule by mouth BID 7/15/20  Yes Yee Parsons MD   Calcium Carbonate-Vitamin D (OSCAL 500/200 D-3 PO) Take 500 mg by mouth daily   Yes Historical Provider, MD   aspirin (ASPIRIN CHILDRENS) 81 MG chewable tablet Take 1 tablet by mouth daily 4/5/17   Manish Blair MD     Allergies  is allergic to [facility] tartrate]; ativan [lorazepam]; darvon [propoxyphene]; restoril [temazepam]; and topamax [topiramate]. Family History  family history includes Heart Disease in her father; High Blood Pressure in her father. Social History   reports that she quit smoking about 13 months ago. Her smoking use included cigarettes. She has a 3.75 pack-year smoking history. She has never used smokeless tobacco. She reports current alcohol use of about 2.0 standard drinks of alcohol per week. She reports that she does not use drugs. Review of Systems:  General Denies any fever or chills  HEENT Denies any diplopia, tinnitus or vertigo  Resp Denies any shortness of breath, cough or wheezing  Cardiac Denies any chest pain, palpitations, claudication or edema  GI Denies any melena, hematochezia, hematemesis or pyrosis   Denies any frequency, urgency, hesitancy or incontinence  Heme Denies bruising or bleeding easily  Endocrine Denies any history of diabetes or thyroid disease  Neuro Denies any focal motor or sensory deficits    OBJECTIVE:   VITALS:  height is 5' 7\" (1.702 m) and weight is 198 lb (89.8 kg). Her oral temperature is 98.1 °F (36.7 °C). Her blood pressure is 122/49 (abnormal) and her pulse is 70. Her respiration is 16 and oxygen saturation is 96%. CONSTITUTIONAL: Alert and oriented times 3, no acute distress and cooperative to examination with proper mood and affect. SKIN: Skin color, texture, turgor normal. No rashes or lesions.   LYMPH: no cervical nodes, no inguinal nodes  HEENT: Head is normocephalic, atraumatic. EOMI, PERRLA  NECK: Supple, symmetrical, trachea midline, no adenopathy, thyroid symmetric, not enlarged and no tenderness, skin normal  CHEST/LUNGS: chest symmetric with normal A/P diameter, normal respiratory rate and rhythm, lungs clear to auscultation without wheezes, rales or rhonchi. No accessory muscle use. Scars None   CARDIOVASCULAR: Heart regular rate and rhythm Normal S1 and S2. . Carotid and femoral pulses 2+/4 and equal bilaterally  ABDOMEN: Normal shape. . Surgical scar(s) present. Normal bowel sounds. No bruits. Soft, nondistended, no masses or organomegaly. no evidence of hernia. Percussion: Normal without hepatosplenomegally. Tenderness: Nonspecific upper abdominal mild tenderness. Nothing acute. RECTAL: deferred, not clinically indicated  NEUROLOGIC: There are no focalizing motor or sensory deficits. CN II-XII are grossly intact.   EXTREMITIES: no cyanosis, no clubbing and no edema    LABS:   CBC with Differential:    Lab Results   Component Value Date    WBC 7.0 10/17/2020    RBC 3.56 10/17/2020    RBC 4.42 06/02/2012    HGB 11.0 10/17/2020    HCT 34.1 10/17/2020     10/17/2020     06/02/2012    MCV 95.6 10/17/2020    MCH 30.9 10/17/2020    MCHC 32.3 10/17/2020    RDW 14.1 10/17/2020    LYMPHOPCT 28 10/17/2020    MONOPCT 8 10/17/2020    BASOPCT 1 10/17/2020    MONOSABS 0.60 10/17/2020    LYMPHSABS 2.00 10/17/2020    EOSABS 0.30 10/17/2020    BASOSABS 0.00 10/17/2020    DIFFTYPE NOT REPORTED 10/17/2020     BMP:    Lab Results   Component Value Date     10/17/2020    K 4.0 10/17/2020     10/17/2020    CO2 28 10/17/2020    BUN 11 10/17/2020    LABALBU 4.1 10/16/2020    LABALBU 4.3 11/07/2011    CREATININE 0.52 10/17/2020    CALCIUM 8.8 10/17/2020    GFRAA >60 10/17/2020    LABGLOM >60 10/17/2020    GLUCOSE 90 10/17/2020    GLUCOSE 97 06/02/2012     Hepatic Function Panel:    Lab Results   Component Value Date ALKPHOS 70 10/16/2020    ALT 12 10/16/2020    AST 19 10/16/2020    PROT 7.3 10/16/2020    BILITOT 0.24 10/16/2020    BILIDIR <0.08 10/27/2014    IBILI NOT REPORTED 10/27/2014    LABALBU 4.1 10/16/2020    LABALBU 4.3 11/07/2011     Calcium:    Lab Results   Component Value Date    CALCIUM 8.8 10/17/2020     Magnesium:    Lab Results   Component Value Date    MG 2.0 09/28/2019     Phosphorus:  No results found for: PHOS  PT/INR:  No results found for: PROTIME, INR  ABG:  No results found for: PHART, PH, DAX8TLD, PCO2, PO2ART, PO2, ATE9KGO, HCO3, BEART, BE, THGBART, THB, PFQ9OQC, O1IDAPZW, O2SAT  Urine Culture:  No components found for: CURINE  Blood Culture:  No components found for: CBLOOD, CFUNGUSBL  Stool Culture:  No components found for: CSTOOL    RADIOLOGY:   I have personally reviewed the following films:  Xr Abdomen (kub) (single Ap View)    Result Date: 10/17/2020  EXAMINATION: ONE SUPINE XRAY VIEW(S) OF THE ABDOMEN 10/17/2020 11:00 am COMPARISON: Acute abdominal series May 29, 2010. CT abdomen and pelvis October 16, 2020. HISTORY: ORDERING SYSTEM PROVIDED HISTORY: pain TECHNOLOGIST PROVIDED HISTORY: pain Reason for Exam: pain Acuity: Unknown Type of Exam: Unknown FINDINGS: Nonspecific bowel gas pattern. Prominent small bowel loop within the mid abdomen similar to the prior CT study. Stool and air seen throughout a nondilated colon. No free air. Postsurgical changes are identified within the pelvis. No suspicious urinary tract calcifications. Osseous structures demonstrate degenerative change. No significant change in abdominal findings compared to the prior CT study perform 16 hours earlier. Prominent mid small bowel loop. Finding could represent ileus or possibly of developing obstruction.      Ct Abdomen Pelvis W Iv Contrast Additional Contrast? None    Result Date: 10/16/2020  EXAMINATION: CT OF THE ABDOMEN AND PELVIS WITH CONTRAST 10/16/2020 6:30 pm TECHNIQUE: CT of the abdomen and pelvis was performed with the administration of intravenous contrast. Multiplanar reformatted images are provided for review. Dose modulation, iterative reconstruction, and/or weight based adjustment of the mA/kV was utilized to reduce the radiation dose to as low as reasonably achievable. COMPARISON: 10/31/2018 HISTORY: ORDERING SYSTEM PROVIDED HISTORY: umbilical adominal pain TECHNOLOGIST PROVIDED HISTORY: umbilical adominal pain Reason for Exam: umbilical adominal pain; pt c/o abd pain, nausea, vomiting, and diarrhea Acuity: Acute Type of Exam: Initial FINDINGS: Lower Chest: There is patchy opacity within both lung bases. No pleural effusion. Organs: The liver is diffusely hypoattenuating. No acute abnormality within the spleen, gallbladder, or adrenal glands. There is mild dilation of the intra and extrahepatic bile ducts and pancreatic duct. No acute abnormality within the kidneys. GI/Bowel: The stomach is partially distended. There are dilated loops of small bowel within the mid abdomen. There is decompressed bowel both above and below this dilated segment. The colon is normal in caliber. Pelvis: Bladder is partially distended without vesicular stone. Uterus is present. Peritoneum/Retroperitoneum: No ascites or pneumoperitoneum. Abdominal aorta is normal in caliber. Bones/Soft Tissues: No acute osseous abnormality     1. Dilated small bowel loops in the mid abdomen, consistent with obstruction. There is decompressed bowel both above and below, concerning for closed loop obstruction. Clinical correlation is recommended. 2. Mild biliary ductal and pancreatic ductal dilation without obstructing stone or lesion identified.      Xr Chest Portable    Result Date: 10/16/2020  EXAMINATION: ONE XRAY VIEW OF THE CHEST 10/16/2020 9:20 pm COMPARISON: 09/28/2019 HISTORY: ORDERING SYSTEM PROVIDED HISTORY: NG tube placement TECHNOLOGIST PROVIDED HISTORY: NG tube placement Reason for Exam: NG tube placement Acuity: Unknown Type of Exam: Unknown Additional signs and symptoms: NG tube placement FINDINGS: Cardiomediastinal silhouette is unchanged in size. Interstitial prominence in both lung bases, left greater than right, similar to prior examination. No pleural effusion or pneumothorax. Enteric tube tip and side port are below the GE junction. Tube terminates in the proximal stomach. .     Enteric tube in expected position. Ct Chest Pulmonary Embolism W Contrast    Result Date: 10/16/2020  EXAMINATION: CTA OF THE CHEST 10/16/2020 6:30 pm TECHNIQUE: CTA of the chest was performed after the administration of intravenous contrast.  Multiplanar reformatted images are provided for review. MIP images are provided for review. Dose modulation, iterative reconstruction, and/or weight based adjustment of the mA/kV was utilized to reduce the radiation dose to as low as reasonably achievable. COMPARISON: None. HISTORY: ORDERING SYSTEM PROVIDED HISTORY: hypoxia TECHNOLOGIST PROVIDED HISTORY: hypoxia Reason for Exam: Hypoxia; pt had no chest complaints Acuity: Unknown Type of Exam: Unknown FINDINGS: Pulmonary Arteries: Pulmonary arteries are adequately opacified for evaluation. No evidence of intraluminal filling defect to suggest pulmonary embolism. Main pulmonary artery is normal in caliber. Mediastinum: No evidence of mediastinal lymphadenopathy. A shotty left suprahilar lymph node is present. The heart and pericardium demonstrate no acute abnormality. There is no acute abnormality of the thoracic aorta. Granulomatous changes are noted. Lungs/pleura: Moderate emphysematous changes are present in the lungs with diffuse subpleural reticular prominence and lower lobe septal thickening without evidence of consolidation or effusion. The tracheobronchial tree is patent. Upper Abdomen: Limited images of the upper abdomen are unremarkable. Soft Tissues/Bones: No acute bone or soft tissue abnormality.      No evidence of pulmonary embolism or acute

## 2020-10-18 LAB
ABSOLUTE EOS #: 0.2 K/UL (ref 0–0.4)
ABSOLUTE IMMATURE GRANULOCYTE: ABNORMAL K/UL (ref 0–0.3)
ABSOLUTE LYMPH #: 2.2 K/UL (ref 1–4.8)
ABSOLUTE MONO #: 0.6 K/UL (ref 0.1–1.3)
ANION GAP SERPL CALCULATED.3IONS-SCNC: 9 MMOL/L (ref 9–17)
BASOPHILS # BLD: 1 % (ref 0–2)
BASOPHILS ABSOLUTE: 0 K/UL (ref 0–0.2)
BUN BLDV-MCNC: 11 MG/DL (ref 8–23)
BUN/CREAT BLD: ABNORMAL (ref 9–20)
CALCIUM SERPL-MCNC: 8.6 MG/DL (ref 8.6–10.4)
CHLORIDE BLD-SCNC: 107 MMOL/L (ref 98–107)
CO2: 25 MMOL/L (ref 20–31)
CREAT SERPL-MCNC: 0.55 MG/DL (ref 0.5–0.9)
DIFFERENTIAL TYPE: ABNORMAL
EOSINOPHILS RELATIVE PERCENT: 3 % (ref 0–4)
GFR AFRICAN AMERICAN: >60 ML/MIN
GFR NON-AFRICAN AMERICAN: >60 ML/MIN
GFR SERPL CREATININE-BSD FRML MDRD: ABNORMAL ML/MIN/{1.73_M2}
GFR SERPL CREATININE-BSD FRML MDRD: ABNORMAL ML/MIN/{1.73_M2}
GLUCOSE BLD-MCNC: 87 MG/DL (ref 70–99)
HCT VFR BLD CALC: 33 % (ref 36–46)
HCT VFR BLD CALC: 33.1 % (ref 36–46)
HCT VFR BLD CALC: 33.3 % (ref 36–46)
HCT VFR BLD CALC: 33.5 % (ref 36–46)
HCT VFR BLD CALC: 34.9 % (ref 36–46)
HEMOGLOBIN: 10.8 G/DL (ref 12–16)
HEMOGLOBIN: 10.9 G/DL (ref 12–16)
HEMOGLOBIN: 11 G/DL (ref 12–16)
HEMOGLOBIN: 11.1 G/DL (ref 12–16)
HEMOGLOBIN: 11.5 G/DL (ref 12–16)
IMMATURE GRANULOCYTES: ABNORMAL %
LYMPHOCYTES # BLD: 35 % (ref 24–44)
MCH RBC QN AUTO: 31.5 PG (ref 26–34)
MCHC RBC AUTO-ENTMCNC: 32.8 G/DL (ref 31–37)
MCV RBC AUTO: 96 FL (ref 80–100)
MONOCYTES # BLD: 9 % (ref 1–7)
NRBC AUTOMATED: ABNORMAL PER 100 WBC
PDW BLD-RTO: 14.1 % (ref 11.5–14.9)
PLATELET # BLD: 202 K/UL (ref 150–450)
PLATELET ESTIMATE: ABNORMAL
PMV BLD AUTO: 8.5 FL (ref 6–12)
POTASSIUM SERPL-SCNC: 3.5 MMOL/L (ref 3.7–5.3)
RBC # BLD: 3.44 M/UL (ref 4–5.2)
RBC # BLD: ABNORMAL 10*6/UL
SEG NEUTROPHILS: 52 % (ref 36–66)
SEGMENTED NEUTROPHILS ABSOLUTE COUNT: 3.3 K/UL (ref 1.3–9.1)
SODIUM BLD-SCNC: 141 MMOL/L (ref 135–144)
WBC # BLD: 6.2 K/UL (ref 3.5–11)
WBC # BLD: ABNORMAL 10*3/UL

## 2020-10-18 PROCEDURE — 6370000000 HC RX 637 (ALT 250 FOR IP): Performed by: SURGERY

## 2020-10-18 PROCEDURE — 85025 COMPLETE CBC W/AUTO DIFF WBC: CPT

## 2020-10-18 PROCEDURE — 2060000000 HC ICU INTERMEDIATE R&B

## 2020-10-18 PROCEDURE — 85014 HEMATOCRIT: CPT

## 2020-10-18 PROCEDURE — 2580000003 HC RX 258: Performed by: INTERNAL MEDICINE

## 2020-10-18 PROCEDURE — 6370000000 HC RX 637 (ALT 250 FOR IP): Performed by: INTERNAL MEDICINE

## 2020-10-18 PROCEDURE — 94761 N-INVAS EAR/PLS OXIMETRY MLT: CPT

## 2020-10-18 PROCEDURE — 85018 HEMOGLOBIN: CPT

## 2020-10-18 PROCEDURE — 99232 SBSQ HOSP IP/OBS MODERATE 35: CPT | Performed by: INTERNAL MEDICINE

## 2020-10-18 PROCEDURE — 80048 BASIC METABOLIC PNL TOTAL CA: CPT

## 2020-10-18 PROCEDURE — 6360000002 HC RX W HCPCS: Performed by: INTERNAL MEDICINE

## 2020-10-18 PROCEDURE — 36415 COLL VENOUS BLD VENIPUNCTURE: CPT

## 2020-10-18 RX ORDER — OXYCODONE HYDROCHLORIDE AND ACETAMINOPHEN 5; 325 MG/1; MG/1
1 TABLET ORAL DAILY PRN
Status: DISCONTINUED | OUTPATIENT
Start: 2020-10-18 | End: 2020-10-18

## 2020-10-18 RX ORDER — OXYCODONE HYDROCHLORIDE AND ACETAMINOPHEN 5; 325 MG/1; MG/1
1 TABLET ORAL 2 TIMES DAILY PRN
Status: DISCONTINUED | OUTPATIENT
Start: 2020-10-18 | End: 2020-10-19 | Stop reason: HOSPADM

## 2020-10-18 RX ORDER — OXYCODONE HYDROCHLORIDE AND ACETAMINOPHEN 5; 325 MG/1; MG/1
1 TABLET ORAL 2 TIMES DAILY
Status: DISCONTINUED | OUTPATIENT
Start: 2020-10-18 | End: 2020-10-18

## 2020-10-18 RX ADMIN — OXYCODONE HYDROCHLORIDE AND ACETAMINOPHEN 1 TABLET: 5; 325 TABLET ORAL at 13:31

## 2020-10-18 RX ADMIN — SODIUM CHLORIDE: 9 INJECTION, SOLUTION INTRAVENOUS at 00:09

## 2020-10-18 RX ADMIN — MORPHINE SULFATE 2 MG: 2 INJECTION, SOLUTION INTRAMUSCULAR; INTRAVENOUS at 03:20

## 2020-10-18 RX ADMIN — FLUOXETINE HYDROCHLORIDE 20 MG: 20 CAPSULE ORAL at 09:17

## 2020-10-18 RX ADMIN — TRAZODONE HYDROCHLORIDE 100 MG: 100 TABLET ORAL at 22:16

## 2020-10-18 RX ADMIN — OXYCODONE HYDROCHLORIDE AND ACETAMINOPHEN 1 TABLET: 5; 325 TABLET ORAL at 22:16

## 2020-10-18 RX ADMIN — SODIUM CHLORIDE: 9 INJECTION, SOLUTION INTRAVENOUS at 09:49

## 2020-10-18 RX ADMIN — SODIUM CHLORIDE: 9 INJECTION, SOLUTION INTRAVENOUS at 18:59

## 2020-10-18 ASSESSMENT — PAIN SCALES - GENERAL
PAINLEVEL_OUTOF10: 0
PAINLEVEL_OUTOF10: 3
PAINLEVEL_OUTOF10: 2
PAINLEVEL_OUTOF10: 10
PAINLEVEL_OUTOF10: 0
PAINLEVEL_OUTOF10: 1
PAINLEVEL_OUTOF10: 0
PAINLEVEL_OUTOF10: 6
PAINLEVEL_OUTOF10: 6
PAINLEVEL_OUTOF10: 3
PAINLEVEL_OUTOF10: 0
PAINLEVEL_OUTOF10: 2
PAINLEVEL_OUTOF10: 5

## 2020-10-18 ASSESSMENT — PAIN DESCRIPTION - DESCRIPTORS
DESCRIPTORS: ACHING
DESCRIPTORS: BURNING
DESCRIPTORS: BURNING;TENDER;STABBING
DESCRIPTORS: ACHING

## 2020-10-18 ASSESSMENT — PAIN DESCRIPTION - LOCATION
LOCATION: BACK
LOCATION: BACK;ABDOMEN
LOCATION: BACK

## 2020-10-18 ASSESSMENT — PAIN DESCRIPTION - ORIENTATION
ORIENTATION: MID

## 2020-10-18 ASSESSMENT — PAIN DESCRIPTION - PAIN TYPE
TYPE: CHRONIC PAIN

## 2020-10-18 ASSESSMENT — PAIN DESCRIPTION - PROGRESSION
CLINICAL_PROGRESSION: NOT CHANGED

## 2020-10-18 ASSESSMENT — PAIN DESCRIPTION - FREQUENCY
FREQUENCY: INTERMITTENT
FREQUENCY: INTERMITTENT

## 2020-10-18 ASSESSMENT — PAIN - FUNCTIONAL ASSESSMENT: PAIN_FUNCTIONAL_ASSESSMENT: PREVENTS OR INTERFERES SOME ACTIVE ACTIVITIES AND ADLS

## 2020-10-18 ASSESSMENT — PAIN DESCRIPTION - ONSET
ONSET: ON-GOING

## 2020-10-18 NOTE — CARE COORDINATION
DISCHARGE PLANNING NOTE:    Plan remains for patient to be discharged home without needs. Hgb stable at 10.8. Full liquid diet. Will continue to follow for additional discharge needs.     Electronically signed by Jhon Payne RN on 10/18/2020 at 1:11 PM

## 2020-10-18 NOTE — PROGRESS NOTES
Dr. Richter Argue rounding. Discussed pain management with him, prefers to defer to Dr. Nyasia Parsons. Dr. Nyasia Parsons wants to await pending HgB results.

## 2020-10-18 NOTE — FLOWSHEET NOTE
Patient transferred into  2121 from ICU. Assessment completed. POC discussed. Patient oriented to room. Call light within reach.

## 2020-10-18 NOTE — PROGRESS NOTES
Writer called report to Shoaib Bermudez RN. All questions answered. H & H STAT results also noted in report.  Hgb 11/ Hct 33.1

## 2020-10-18 NOTE — PROGRESS NOTES
History and Physical Service  [facility]    HISTORY AND PHYSICAL EXAMINATION            Date:   10/18/2020  Patient name:  Juan Wall  MRN:   [id number]  Account:  [de-identified]  YOB: 1955  PCP:    Robert Suárez MD  Code Status:    Full Code    Chief Complaint:     Nausea vomiting abdominal pain    History Obtained From:     patient    History of Present Illness: The patient is a 72 y.o. Non-/non  female who presents    66-year-old lady with history of depression arthritis diverticulitis and bowel surgery  Admitted with 1 day history of nausea vomiting abdominal distention not having bowel movements or passing gas admitted and diagnosed with a small bowel obstruction CT scan noted this morning patient had 2 bowel movements NG tube is in place minimal NG drainage       Past Medical History:     Past Medical History:   Diagnosis Date    Arthritis     Depression     Displacement of intervertebral disc, site unspecified, without myelopathy     [facility]    Displacement of lumbar intervertebral disc without myelopathy     [facility]    Hyperlipidemia     Lumbago     [facility]    Sprain of lumbar region     [facility]    Sprain of lumbosacral (joint) (ligament)     [facility]    Sprain of sacrum     [facility]        Past Surgical History:     Past Surgical History:   Procedure Laterality Date    ABDOMINAL EXPLORATION SURGERY      BACK SURGERY      X2    COLON SURGERY      COLONOSCOPY      DILATION AND CURETTAGE OF UTERUS      FOOT SURGERY Right     HAND SURGERY Right 6/18/14    OVARY REMOVAL Bilateral     WRIST ARTHROPLASTY Left         Medications Prior to Admission:     Prior to Admission medications    Medication Sig Start Date End Date Taking?  Authorizing Provider   vitamin C (ASCORBIC ACID) 500 MG tablet Take 1,000 mg by mouth 2 times daily   Yes Historical Provider, MD   traZODone (DESYREL) 100 MG tablet Take 1 tablet by mouth once daily 8/26/20  Yes Janine TIMMONS anxiety    Physical Exam:   BP (!) 115/35   Pulse 79   Temp 98.5 °F (36.9 °C) (Oral)   Resp 19   Ht 5' 7\" (1.702 m)   Wt 196 lb 10.4 oz (89.2 kg)   SpO2 94%   BMI 30.80 kg/m²   No results for input(s): POCGLU in the last 72 hours. NG tube in nose  General Appearance:  alert, well appearing, and in no acute distress  Mental status: oriented to person, place, and time with normal affect  Head:  normocephalic, atraumatic. Eye: no icterus, redness, pupils equal and reactive, extraocular eye movements intact, conjunctiva clear  Ear: normal external ear, no discharge, hearing intact  Nose:  no drainage noted  Mouth: mucous membranes moist  Neck: supple, no carotid bruits, thyroid not palpable  Lungs: Bilateral equal air entry, clear to ausculation, no wheezing, rales or rhonchi, normal effort  Cardiovascular: normal rate, regular rhythm, no murmur, gallop, rub.   Abdomen: Soft no tenderness no guarding bowel sounds heard neurologic: There are no new focal motor or sensory deficits, normal muscle tone and bulk, no abnormal sensation, normal speech, cranial nerves II through XII grossly intact  Skin: No gross lesions, rashes, bruising or bleeding on exposed skin area  Extremities:  peripheral pulses palpable, no pedal edema or calf pain with palpation  Psych: normal affect     Investigations:      Laboratory Testing:  Recent Results (from the past 24 hour(s))   Hgb/Hct    Collection Time: 10/18/20 12:46 AM   Result Value Ref Range    Hemoglobin 11.0 (L) 12.0 - 16.0 g/dL    Hematocrit 33.1 (L) 36 - 46 %   CBC Auto Differential    Collection Time: 10/18/20  3:55 AM   Result Value Ref Range    WBC 6.2 3.5 - 11.0 k/uL    RBC 3.44 (L) 4.0 - 5.2 m/uL    Hemoglobin 10.8 (L) 12.0 - 16.0 g/dL    Hematocrit 33.0 (L) 36 - 46 %    MCV 96.0 80 - 100 fL    MCH 31.5 26 - 34 pg    MCHC 32.8 31 - 37 g/dL    RDW 14.1 11.5 - 14.9 %    Platelets 112 823 - 013 k/uL    MPV 8.5 6.0 - 12.0 fL    NRBC Automated NOT REPORTED per 100 WBC Differential Type NOT REPORTED     Seg Neutrophils 52 36 - 66 %    Lymphocytes 35 24 - 44 %    Monocytes 9 (H) 1 - 7 %    Eosinophils % 3 0 - 4 %    Basophils 1 0 - 2 %    Immature Granulocytes NOT REPORTED 0 %    Segs Absolute 3.30 1.3 - 9.1 k/uL    Absolute Lymph # 2.20 1.0 - 4.8 k/uL    Absolute Mono # 0.60 0.1 - 1.3 k/uL    Absolute Eos # 0.20 0.0 - 0.4 k/uL    Basophils Absolute 0.00 0.0 - 0.2 k/uL    Absolute Immature Granulocyte NOT REPORTED 0.00 - 0.30 k/uL    WBC Morphology NOT REPORTED     RBC Morphology NOT REPORTED     Platelet Estimate NOT REPORTED    Basic Metabolic Panel    Collection Time: 10/18/20  3:55 AM   Result Value Ref Range    Glucose 87 70 - 99 mg/dL    BUN 11 8 - 23 mg/dL    CREATININE 0.55 0.50 - 0.90 mg/dL    Bun/Cre Ratio NOT REPORTED 9 - 20    Calcium 8.6 8.6 - 10.4 mg/dL    Sodium 141 135 - 144 mmol/L    Potassium 3.5 (L) 3.7 - 5.3 mmol/L    Chloride 107 98 - 107 mmol/L    CO2 25 20 - 31 mmol/L    Anion Gap 9 9 - 17 mmol/L    GFR Non-African American >60 >60 mL/min    GFR African American >60 >60 mL/min    GFR Comment          GFR Staging NOT REPORTED    Hgb/Hct    Collection Time: 10/18/20 12:05 PM   Result Value Ref Range    Hemoglobin 10.9 (L) 12.0 - 16.0 g/dL    Hematocrit 33.3 (L) 36 - 46 %       Recent Labs     10/18/20  1205 10/18/20  0355  10/16/20  1742   HGB 10.9* 10.8*   < > 13.0   HCT 33.3* 33.0*   < > 38.9   WBC  --  6.2   < > 10.8   MCV  --  96.0   < > 94.9   NA  --  141   < > 138   K  --  3.5*   < > 4.0   CL  --  107   < > 96*   CO2  --  25   < > 29   BUN  --  11   < > 17   CREATININE  --  0.55   < > 0.62   GLUCOSE  --  87   < > 135*   AST  --   --   --  19   ALT  --   --   --  12   LABALBU  --   --   --  4.1    < > = values in this interval not displayed.        Hematology:  Recent Labs     10/16/20  1742 10/17/20  1049 10/18/20  0046 10/18/20  0355 10/18/20  1205   WBC 10.8 7.0  --  6.2  --    RBC 4.10 3.56*  --  3.44*  --    HGB 13.0 11.0* 11.0* 10.8* 10.9*   HCT 38.9 34.1* 33.1* 33.0* 33.3*   MCV 94.9 95.6  --  96.0  --    MCH 31.8 30.9  --  31.5  --    MCHC 33.5 32.3  --  32.8  --    RDW 13.8 14.1  --  14.1  --     209  --  202  --    MPV 8.6 8.6  --  8.5  --      Chemistry:  Recent Labs     10/16/20  1742 10/17/20  1049 10/18/20  0355    139 141   K 4.0 4.0 3.5*   CL 96* 104 107   CO2 29 28 25   GLUCOSE 135* 90 87   BUN 17 11 11   CREATININE 0.62 0.52 0.55   ANIONGAP 13 7* 9   LABGLOM >60 >60 >60   GFRAA >60 >60 >60   CALCIUM 9.8 8.8 8.6   TROPONINT NOT REPORTED  --   --      Recent Labs     10/16/20  1742   PROT 7.3   LABALBU 4.1   AST 19   ALT 12   ALKPHOS 70   BILITOT 0.24*   LIPASE 24       Imaging/Diagnostics:       Ct Abdomen Pelvis W Iv Contrast Additional Contrast? None    Result Date: 10/16/2020  EXAMINATION: CT OF THE ABDOMEN AND PELVIS WITH CONTRAST 10/16/2020 6:30 pm TECHNIQUE: CT of the abdomen and pelvis was performed with the administration of intravenous contrast. Multiplanar reformatted images are provided for review. Dose modulation, iterative reconstruction, and/or weight based adjustment of the mA/kV was utilized to reduce the radiation dose to as low as reasonably achievable. COMPARISON: 10/31/2018 HISTORY: ORDERING SYSTEM PROVIDED HISTORY: umbilical adominal pain TECHNOLOGIST PROVIDED HISTORY: umbilical adominal pain Reason for Exam: umbilical adominal pain; pt c/o abd pain, nausea, vomiting, and diarrhea Acuity: Acute Type of Exam: Initial FINDINGS: Lower Chest: There is patchy opacity within both lung bases. No pleural effusion. Organs: The liver is diffusely hypoattenuating. No acute abnormality within the spleen, gallbladder, or adrenal glands. There is mild dilation of the intra and extrahepatic bile ducts and pancreatic duct. No acute abnormality within the kidneys. GI/Bowel: The stomach is partially distended. There are dilated loops of small bowel within the mid abdomen.   There is decompressed bowel both above and below this dilated segment. The colon is normal in caliber. Pelvis: Bladder is partially distended without vesicular stone. Uterus is present. Peritoneum/Retroperitoneum: No ascites or pneumoperitoneum. Abdominal aorta is normal in caliber. Bones/Soft Tissues: No acute osseous abnormality     1. Dilated small bowel loops in the mid abdomen, consistent with obstruction. There is decompressed bowel both above and below, concerning for closed loop obstruction. Clinical correlation is recommended. 2. Mild biliary ductal and pancreatic ductal dilation without obstructing stone or lesion identified. Xr Chest Portable    Result Date: 10/16/2020  EXAMINATION: ONE XRAY VIEW OF THE CHEST 10/16/2020 9:20 pm COMPARISON: 09/28/2019 HISTORY: ORDERING SYSTEM PROVIDED HISTORY: NG tube placement TECHNOLOGIST PROVIDED HISTORY: NG tube placement Reason for Exam: NG tube placement Acuity: Unknown Type of Exam: Unknown Additional signs and symptoms: NG tube placement FINDINGS: Cardiomediastinal silhouette is unchanged in size. Interstitial prominence in both lung bases, left greater than right, similar to prior examination. No pleural effusion or pneumothorax. Enteric tube tip and side port are below the GE junction. Tube terminates in the proximal stomach. .     Enteric tube in expected position. Ct Chest Pulmonary Embolism W Contrast    Result Date: 10/16/2020  EXAMINATION: CTA OF THE CHEST 10/16/2020 6:30 pm TECHNIQUE: CTA of the chest was performed after the administration of intravenous contrast.  Multiplanar reformatted images are provided for review. MIP images are provided for review. Dose modulation, iterative reconstruction, and/or weight based adjustment of the mA/kV was utilized to reduce the radiation dose to as low as reasonably achievable. COMPARISON: None.  HISTORY: ORDERING SYSTEM PROVIDED HISTORY: hypoxia TECHNOLOGIST PROVIDED HISTORY: hypoxia Reason for Exam: Hypoxia; pt had no chest complaints Acuity: Unknown Type of Exam: Unknown FINDINGS: Pulmonary Arteries: Pulmonary arteries are adequately opacified for evaluation. No evidence of intraluminal filling defect to suggest pulmonary embolism. Main pulmonary artery is normal in caliber. Mediastinum: No evidence of mediastinal lymphadenopathy. A shotty left suprahilar lymph node is present. The heart and pericardium demonstrate no acute abnormality. There is no acute abnormality of the thoracic aorta. Granulomatous changes are noted. Lungs/pleura: Moderate emphysematous changes are present in the lungs with diffuse subpleural reticular prominence and lower lobe septal thickening without evidence of consolidation or effusion. The tracheobronchial tree is patent. Upper Abdomen: Limited images of the upper abdomen are unremarkable. Soft Tissues/Bones: No acute bone or soft tissue abnormality. No evidence of pulmonary embolism or acute pulmonary abnormality. Patient has chronic pulmonary changes with septal thickening in the lower lobes without evidence of acute consolidation. Impressions :      1. Active Problems:    Obesity (BMI 30-39. 9)    Small bowel obstruction (HCC)  Resolved Problems:    * No resolved hospital problems. *        2.  has a past medical history of Arthritis, Depression, Displacement of intervertebral disc, site unspecified, without myelopathy, Displacement of lumbar intervertebral disc without myelopathy, Hyperlipidemia, Lumbago, Sprain of lumbar region, Sprain of lumbosacral (joint) (ligament), and Sprain of sacrum.      Plans:     1 small bowel obstruction with history of bowel surgeries before for diverticulitis  N.p.o. NG tube is in place  IV fluid resuscitation  Patient seen and examined with Dr. Jose Hammond surgeon  Patient is having bowel movements no significant drainage from the NG tube plan is to clamp the tube and have a liquid diet later today  Insomnia did not get her trazodone last night will restart home medication  Oct 18  nv improved  Bowel sounds ok  Eating soft diet  Ok to progressive      Current Facility-Administered Medications   Medication Dose Route Frequency Provider Last Rate Last Dose    FLUoxetine (PROZAC) capsule 20 mg  20 mg Oral Daily Jaz Borwn MD   20 mg at 10/18/20 0917    traZODone (DESYREL) tablet 100 mg  100 mg Oral Nightly Jaz Brown MD   100 mg at 10/17/20 2049    diatrizoate meglumine-sodium (GASTROGRAFIN) 66-10 % solution 300 mL  300 mL Per NG tube ONCE PRN Wally Jesus MD   300 mL at 10/17/20 1708    oxymetazoline (AFRIN) 0.05 % nasal spray 2 spray  2 spray Each Nostril Once Kaylee Barillas MD        lidocaine (XYLOCAINE) 2 % jelly   Topical Once Kaylee Barillas MD        0.9 % sodium chloride infusion   Intravenous Continuous Jaz Brown  mL/hr at 10/18/20 0949      sodium chloride flush 0.9 % injection 10 mL  10 mL Intravenous 2 times per day Jaz Brown MD   Stopped at 10/18/20 1038    sodium chloride flush 0.9 % injection 10 mL  10 mL Intravenous PRN Jaz Brown MD        acetaminophen (TYLENOL) tablet 650 mg  650 mg Oral Q4H PRN Selvin Duremi Honeycutt MD        ondansetron (ZOFRAN) injection 4 mg  4 mg Intravenous Q8H PRN Jaz Brown MD        morphine (PF) injection 2 mg  2 mg Intravenous Q2H PRN Jaz Brown MD   2 mg at 10/18/20 0320    Or    morphine sulfate (PF) injection 4 mg  4 mg Intravenous Q2H PRN Jaz Brown MD   4 mg at 10/17/20 0836    influenza quadrivalent split vaccine (FLUZONE;FLUARIX;FLULAVAL;AFLURIA) injection 0.5 mL  0.5 mL Intramuscular Once Jaz Brwon MD   Stopped at 10/18/20 3501 [facility] Hospital[facility]andel Schmitt MD  10/18/2020  12:28 PM

## 2020-10-18 NOTE — PROGRESS NOTES
Attempt to notify Dr. Lux Vivas re: stable HgB results and discuss pain management. No answer at this time. Will reattempt.

## 2020-10-18 NOTE — PROGRESS NOTES
During assessment, pt expresses discomfort to her back, chronic in nature. Pt states it is tolerable at this time. Per pt, she takes Percocet at home BID PRN for discomfort, unsure of dose. Will discuss this with residents to help appropriately manage her discomfort. Safety maintained.

## 2020-10-18 NOTE — PROGRESS NOTES
Pt seen and examined  Afebrile vsstable  Had several BMs including one large bloody BM per nursing  No bloody BM since in ICU  Feels better  No N?V  SBFT negative no Obst  abd benign  Ext nontender  Labs noted  HB stable    Cont Full liquids  Recheck Hb at noon if stable can move out to stepdown  ?  Colonoscopy this admission  Discussed with pt and nursing staff

## 2020-10-18 NOTE — PLAN OF CARE
Problem: Pain:  Goal: Pain level will decrease  Description: Pain level will decrease  10/18/2020 0645 by Courtney Brody RN  Outcome: Ongoing  10/17/2020 2211 by Diana Low RN  Outcome: Ongoing  Note: Medicated for complaints of abdominal cramping with adequate relief obtained. 10/17/2020 1819 by Cornelius Gonzalez RN  Outcome: Ongoing  Goal: Control of acute pain  Description: Control of acute pain  10/18/2020 0645 by Courtney Brody RN  Outcome: Ongoing  10/17/2020 2211 by Diana Low RN  Outcome: Ongoing  10/17/2020 1819 by Cornelius Gonzalez RN  Outcome: Ongoing  Goal: Control of chronic pain  Description: Control of chronic pain  10/18/2020 0645 by Courtney Brody RN  Outcome: Ongoing  Note: Patient's chronic pain has been managed this shift. Patient able to identify pain level via 1-10 pain scale. Patient did receive PRN morphine to address chronic back pain. 10/17/2020 2211 by Diana Low RN  Outcome: Ongoing  10/17/2020 1819 by Cornelius Gonzalez RN  Outcome: Ongoing  Intervention: Promote participation in pain management plan  10/17/2020 1819 by Cornelius Gonzalez RN  Note: Pain tolerable with morphine     Problem: Falls - Risk of:  Goal: Will remain free from falls  Description: Will remain free from falls  10/18/2020 0645 by Courtney Brody RN  Outcome: Ongoing  Note: Patient's bed is locked in lowest height with 3/4 side-rails up. Bed alarm is on and patient is wearing non-slip footwear. Patient's bed table and call button are within reach. Patient has been free from falls this shift. 10/17/2020 2211 by Diana Low RN  Outcome: Ongoing  Note: No falls this shift.  Uses call light appropriately  10/17/2020 1819 by Cornelius Gonzalez RN  Outcome: Ongoing  Goal: Absence of physical injury  Description: Absence of physical injury  10/18/2020 0645 by Courtney Brody RN  Outcome: Ongoing  10/17/2020 2211 by Diana Low RN  Outcome: Ongoing  10/17/2020 1819 by Orville Garza RN  Outcome: Ongoing  Note: Pt alert and able to verbalize     Problem: Skin Integrity:  Goal: Will show no infection signs and symptoms  Description: Will show no infection signs and symptoms  10/18/2020 0645 by Vish Robert RN  Outcome: Ongoing  10/17/2020 2211 by Mike Martinez RN  Outcome: Ongoing  10/17/2020 1819 by Orville Garza RN  Outcome: Ongoing  Goal: Absence of new skin breakdown  Description: Absence of new skin breakdown  10/18/2020 0645 by Vish Robert RN  Outcome: Ongoing  Note: Patient is able to move self freely. Patient's current skin issues have been managed with no new skin issues noted.   10/17/2020 2211 by Mike Martinez RN  Outcome: Ongoing  10/17/2020 1819 by Orville Garza RN  Outcome: Ongoing  Note: Monitor skin surfaces

## 2020-10-18 NOTE — FLOWSHEET NOTE
10/18/20 1418   Encounter Summary   Services provided to: Patient   Referral/Consult From: [facility]   (10/18/20)   Complexity of Encounter Moderate   Length of Encounter 15 minutes   Spiritual Assessment Completed Yes   Spiritual/Mu-ism   Type Spiritual support   Assessment Approachable; Hopeful;Coping;Helplessness   Intervention Active listening;Prayer;Sustaining presence/ Ministry of presence; Discussed illness/injury and it's impact; Discussed belief system/Christianity practices/michael   Outcome Expressed gratitude;Engaged in conversation;Expressed feelings/needs/concerns;Coping; Hopeful;Receptive

## 2020-10-18 NOTE — PROGRESS NOTES
Patient transferred to Intermediate bed 08. Cardiac monitor applied to patient prior to tx. Patient HR 78 and NSR. No distress noted. All questions answered. All belongings with patient.

## 2020-10-18 NOTE — PLAN OF CARE
Problem: Pain:  Goal: Pain level will decrease  Description: Pain level will decrease  10/17/2020 2211 by Prasad Lewis RN  Outcome: Ongoing  Note: Medicated for complaints of abdominal cramping with adequate relief obtained. Problem: Pain:  Goal: Control of acute pain  Description: Control of acute pain  10/17/2020 2211 by Prasad Lewis RN  Outcome: Ongoing     Problem: Pain:  Goal: Control of chronic pain  Description: Control of chronic pain  10/17/2020 2211 by Prasad Lewis RN  Outcome: Ongoing     Problem: Falls - Risk of:  Goal: Will remain free from falls  Description: Will remain free from falls  10/17/2020 2211 by Prasad Lewis RN  Outcome: Ongoing  Note: No falls this shift.  Uses call light appropriately     Problem: Falls - Risk of:  Goal: Absence of physical injury  Description: Absence of physical injury  10/17/2020 2211 by Prasad Lewis RN  Outcome: Ongoing     Problem: Skin Integrity:  Goal: Will show no infection signs and symptoms  Description: Will show no infection signs and symptoms  10/17/2020 2211 by Prasad Lewis RN  Outcome: Ongoing     Problem: Skin Integrity:  Goal: Absence of new skin breakdown  Description: Absence of new skin breakdown  10/17/2020 2211 by Prasad Lewis RN  Outcome: Ongoing

## 2020-10-18 NOTE — PROGRESS NOTES
Patient complaining of pain. Patient states 1 percocet is not enough and is requesting 2. Writer informed patient she has morphine ordered for breakthrough pain. Patient states morphine is not effective. Writer explained that percocet can cause constipation. Writer called Dr. Shama Duval to discuss patient's pain medications. Dr. Shama Duval gave verbal order via telephone with readback to resume patient's home medication of 1 percocet 5-325mg  bid. Writer will inform patient and continue to monitor.

## 2020-10-19 VITALS
TEMPERATURE: 97.9 F | RESPIRATION RATE: 17 BRPM | SYSTOLIC BLOOD PRESSURE: 131 MMHG | BODY MASS INDEX: 28.09 KG/M2 | HEIGHT: 67 IN | DIASTOLIC BLOOD PRESSURE: 56 MMHG | OXYGEN SATURATION: 96 % | HEART RATE: 72 BPM | WEIGHT: 179 LBS

## 2020-10-19 LAB
-: NORMAL
ABSOLUTE EOS #: 0.2 K/UL (ref 0–0.4)
ABSOLUTE IMMATURE GRANULOCYTE: ABNORMAL K/UL (ref 0–0.3)
ABSOLUTE LYMPH #: 1.9 K/UL (ref 1–4.8)
ABSOLUTE MONO #: 0.5 K/UL (ref 0.1–1.3)
ANION GAP SERPL CALCULATED.3IONS-SCNC: 9 MMOL/L (ref 9–17)
BASOPHILS # BLD: 1 % (ref 0–2)
BASOPHILS ABSOLUTE: 0 K/UL (ref 0–0.2)
BUN BLDV-MCNC: 5 MG/DL (ref 8–23)
BUN/CREAT BLD: ABNORMAL (ref 9–20)
CALCIUM SERPL-MCNC: 8.8 MG/DL (ref 8.6–10.4)
CHLORIDE BLD-SCNC: 108 MMOL/L (ref 98–107)
CO2: 24 MMOL/L (ref 20–31)
CREAT SERPL-MCNC: 0.49 MG/DL (ref 0.5–0.9)
DIFFERENTIAL TYPE: ABNORMAL
EOSINOPHILS RELATIVE PERCENT: 5 % (ref 0–4)
GFR AFRICAN AMERICAN: >60 ML/MIN
GFR NON-AFRICAN AMERICAN: >60 ML/MIN
GFR SERPL CREATININE-BSD FRML MDRD: ABNORMAL ML/MIN/{1.73_M2}
GFR SERPL CREATININE-BSD FRML MDRD: ABNORMAL ML/MIN/{1.73_M2}
GLUCOSE BLD-MCNC: 89 MG/DL (ref 70–99)
HCT VFR BLD CALC: 33.4 % (ref 36–46)
HEMOGLOBIN: 11.1 G/DL (ref 12–16)
IMMATURE GRANULOCYTES: ABNORMAL %
LYMPHOCYTES # BLD: 37 % (ref 24–44)
MCH RBC QN AUTO: 31.8 PG (ref 26–34)
MCHC RBC AUTO-ENTMCNC: 33.1 G/DL (ref 31–37)
MCV RBC AUTO: 96 FL (ref 80–100)
MONOCYTES # BLD: 9 % (ref 1–7)
NRBC AUTOMATED: ABNORMAL PER 100 WBC
PDW BLD-RTO: 14 % (ref 11.5–14.9)
PLATELET # BLD: 206 K/UL (ref 150–450)
PLATELET ESTIMATE: ABNORMAL
PMV BLD AUTO: 8.2 FL (ref 6–12)
POTASSIUM SERPL-SCNC: 3.8 MMOL/L (ref 3.7–5.3)
RBC # BLD: 3.48 M/UL (ref 4–5.2)
RBC # BLD: ABNORMAL 10*6/UL
REASON FOR REJECTION: NORMAL
SEG NEUTROPHILS: 48 % (ref 36–66)
SEGMENTED NEUTROPHILS ABSOLUTE COUNT: 2.4 K/UL (ref 1.3–9.1)
SODIUM BLD-SCNC: 141 MMOL/L (ref 135–144)
WBC # BLD: 5.1 K/UL (ref 3.5–11)
WBC # BLD: ABNORMAL 10*3/UL
ZZ NTE CLEAN UP: ORDERED TEST: NORMAL
ZZ NTE WITH NAME CLEAN UP: SPECIMEN SOURCE: NORMAL

## 2020-10-19 PROCEDURE — G0008 ADMIN INFLUENZA VIRUS VAC: HCPCS | Performed by: INTERNAL MEDICINE

## 2020-10-19 PROCEDURE — 36415 COLL VENOUS BLD VENIPUNCTURE: CPT

## 2020-10-19 PROCEDURE — 99239 HOSP IP/OBS DSCHRG MGMT >30: CPT | Performed by: INTERNAL MEDICINE

## 2020-10-19 PROCEDURE — 80048 BASIC METABOLIC PNL TOTAL CA: CPT

## 2020-10-19 PROCEDURE — 85025 COMPLETE CBC W/AUTO DIFF WBC: CPT

## 2020-10-19 PROCEDURE — 6360000002 HC RX W HCPCS: Performed by: INTERNAL MEDICINE

## 2020-10-19 PROCEDURE — 6370000000 HC RX 637 (ALT 250 FOR IP): Performed by: SURGERY

## 2020-10-19 PROCEDURE — 90686 IIV4 VACC NO PRSV 0.5 ML IM: CPT | Performed by: INTERNAL MEDICINE

## 2020-10-19 PROCEDURE — 6370000000 HC RX 637 (ALT 250 FOR IP): Performed by: INTERNAL MEDICINE

## 2020-10-19 RX ADMIN — INFLUENZA A VIRUS A/VICTORIA/2454/2019 IVR-207 (H1N1) ANTIGEN (PROPIOLACTONE INACTIVATED), INFLUENZA A VIRUS A/HONG KONG/2671/2019 IVR-208 (H3N2) ANTIGEN (PROPIOLACTONE INACTIVATED), INFLUENZA B VIRUS B/VICTORIA/705/2018 BVR-11 ANTIGEN (PROPIOLACTONE INACTIVATED), INFLUENZA B VIRUS B/PHUKET/3073/2013 BVR-1B ANTIGEN (PROPIOLACTONE INACTIVATED) 0.5 ML: 15; 15; 15; 15 INJECTION, SUSPENSION INTRAMUSCULAR at 13:55

## 2020-10-19 RX ADMIN — FLUOXETINE HYDROCHLORIDE 20 MG: 20 CAPSULE ORAL at 09:28

## 2020-10-19 RX ADMIN — OXYCODONE HYDROCHLORIDE AND ACETAMINOPHEN 1 TABLET: 5; 325 TABLET ORAL at 09:28

## 2020-10-19 ASSESSMENT — PAIN SCALES - GENERAL: PAINLEVEL_OUTOF10: 8

## 2020-10-19 NOTE — PROGRESS NOTES
Patient VS WNL, IV removed and flu vaccine given. Patient dc'd with all personal belongings via [facility] per writer.

## 2020-10-19 NOTE — CARE COORDINATION
ONGOING DISCHARGE PLAN:    Spoke with patient regarding discharge plan and patient confirms that plan is still to return home with no needs. Patient said her  is available for assistance if needed. Remains on full liquid diet. May need colonoscopy. Will continue to follow for additional discharge needs.     Electronically signed by Ramiro Aparicio RN on 10/19/2020 at 11:14 AM

## 2020-10-19 NOTE — DISCHARGE SUMMARY
[place] [address] Internal Medicine    Discharge Summary     Patient ID: Pepe Stroud  :  1955   MRN: [id number]     ACCOUNT:  [de-identified]   Patient's PCP: Aaliyah Rainey MD  Admit Date: 10/16/2020   Discharge Date: 10/19/2020    Length of Stay: 3  Code Status:  Full Code  Admitting Physician: Saul Phipps MD  Discharge Physician: Antonio Terry MD     Active Discharge Diagnoses:     Primary Problem  <principal problem not specified>      Hospital Problems  Active Hospital Problems    Diagnosis Date Noted    Small bowel obstruction ([address] [G91.511] 10/16/2020    Obesity (BMI 30-39. 9) [E66.9] 2018       Admission Condition:  fair     Discharged Condition: fair    Hospital Stay:     Hospital Course:  Pepe Stroud is a 72 y.o. female who was admitted for the management of <principal problem not specified> , presented to ER with Abdominal Pain (sudden onset @ 14:00 today); Nausea; Emesis; and Diarrhea    58-year-old female with history of depression arthritis diverticulitis, febrile surgery admitted with 1 day history of nausea vomiting abdominal distention, not all bowel movements or passing gas, diagnosed with small bowel obstruction, NG tube inserted. Surgery consulted. IV fluids administered. Patient started to have bowel movements the day following admission,  Seen and examined by myself as well as surgeon on 10/19-tolerating oral diet, denies any nausea vomiting diarrhea , reports small BM, passing gas, good bowel sounds. Feeling well ready to be discharged.       Significant therapeutic interventions: As above    Significant Diagnostic Studies:   Labs / Micro:    Lab Results   Component Value Date    WBC 5.1 10/19/2020    HGB 11.1 (L) 10/19/2020    HCT 33.4 (L) 10/19/2020    MCV 96.0 10/19/2020     10/19/2020          Lab Results   Component Value Date     10/19/2020    K 3.8 10/19/2020     10/19/2020    CO2 24 10/19/2020    BUN 5 10/19/2020    CREATININE 0.49 10/19/2020    GLUCOSE 89 10/19/2020    GLUCOSE 97 06/02/2012    CALCIUM 8.8 10/19/2020          Radiology:    Xr Abdomen (kub) (single Ap View)    Result Date: 10/17/2020  EXAMINATION: ONE SUPINE XRAY VIEW(S) OF THE ABDOMEN 10/17/2020 11:00 am COMPARISON: Acute abdominal series May 29, 2010. CT abdomen and pelvis October 16, 2020. HISTORY: ORDERING SYSTEM PROVIDED HISTORY: pain TECHNOLOGIST PROVIDED HISTORY: pain Reason for Exam: pain Acuity: Unknown Type of Exam: Unknown FINDINGS: Nonspecific bowel gas pattern. Prominent small bowel loop within the mid abdomen similar to the prior CT study. Stool and air seen throughout a nondilated colon. No free air. Postsurgical changes are identified within the pelvis. No suspicious urinary tract calcifications. Osseous structures demonstrate degenerative change. No significant change in abdominal findings compared to the prior CT study perform 16 hours earlier. Prominent mid small bowel loop. Finding could represent ileus or possibly of developing obstruction. Ct Abdomen Pelvis W Iv Contrast Additional Contrast? None    Result Date: 10/16/2020  EXAMINATION: CT OF THE ABDOMEN AND PELVIS WITH CONTRAST 10/16/2020 6:30 pm TECHNIQUE: CT of the abdomen and pelvis was performed with the administration of intravenous contrast. Multiplanar reformatted images are provided for review. Dose modulation, iterative reconstruction, and/or weight based adjustment of the mA/kV was utilized to reduce the radiation dose to as low as reasonably achievable. COMPARISON: 10/31/2018 HISTORY: ORDERING SYSTEM PROVIDED HISTORY: umbilical adominal pain TECHNOLOGIST PROVIDED HISTORY: umbilical adominal pain Reason for Exam: umbilical adominal pain; pt c/o abd pain, nausea, vomiting, and diarrhea Acuity: Acute Type of Exam: Initial FINDINGS: Lower Chest: There is patchy opacity within both lung bases. No pleural effusion. Organs:  The liver is diffusely hypoattenuating. No acute abnormality within the spleen, gallbladder, or adrenal glands. There is mild dilation of the intra and extrahepatic bile ducts and pancreatic duct. No acute abnormality within the kidneys. GI/Bowel: The stomach is partially distended. There are dilated loops of small bowel within the mid abdomen. There is decompressed bowel both above and below this dilated segment. The colon is normal in caliber. Pelvis: Bladder is partially distended without vesicular stone. Uterus is present. Peritoneum/Retroperitoneum: No ascites or pneumoperitoneum. Abdominal aorta is normal in caliber. Bones/Soft Tissues: No acute osseous abnormality     1. Dilated small bowel loops in the mid abdomen, consistent with obstruction. There is decompressed bowel both above and below, concerning for closed loop obstruction. Clinical correlation is recommended. 2. Mild biliary ductal and pancreatic ductal dilation without obstructing stone or lesion identified. Xr Chest Portable    Result Date: 10/16/2020  EXAMINATION: ONE XRAY VIEW OF THE CHEST 10/16/2020 9:20 pm COMPARISON: 09/28/2019 HISTORY: ORDERING SYSTEM PROVIDED HISTORY: NG tube placement TECHNOLOGIST PROVIDED HISTORY: NG tube placement Reason for Exam: NG tube placement Acuity: Unknown Type of Exam: Unknown Additional signs and symptoms: NG tube placement FINDINGS: Cardiomediastinal silhouette is unchanged in size. Interstitial prominence in both lung bases, left greater than right, similar to prior examination. No pleural effusion or pneumothorax. Enteric tube tip and side port are below the GE junction. Tube terminates in the proximal stomach. .     Enteric tube in expected position. Ct Chest Pulmonary Embolism W Contrast    Result Date: 10/16/2020  EXAMINATION: CTA OF THE CHEST 10/16/2020 6:30 pm TECHNIQUE: CTA of the chest was performed after the administration of intravenous contrast.  Multiplanar reformatted images are provided for review. MIP images are provided for review. Dose modulation, iterative reconstruction, and/or weight based adjustment of the mA/kV was utilized to reduce the radiation dose to as low as reasonably achievable. COMPARISON: None. HISTORY: ORDERING SYSTEM PROVIDED HISTORY: hypoxia TECHNOLOGIST PROVIDED HISTORY: hypoxia Reason for Exam: Hypoxia; pt had no chest complaints Acuity: Unknown Type of Exam: Unknown FINDINGS: Pulmonary Arteries: Pulmonary arteries are adequately opacified for evaluation. No evidence of intraluminal filling defect to suggest pulmonary embolism. Main pulmonary artery is normal in caliber. Mediastinum: No evidence of mediastinal lymphadenopathy. A shotty left suprahilar lymph node is present. The heart and pericardium demonstrate no acute abnormality. There is no acute abnormality of the thoracic aorta. Granulomatous changes are noted. Lungs/pleura: Moderate emphysematous changes are present in the lungs with diffuse subpleural reticular prominence and lower lobe septal thickening without evidence of consolidation or effusion. The tracheobronchial tree is patent. Upper Abdomen: Limited images of the upper abdomen are unremarkable. Soft Tissues/Bones: No acute bone or soft tissue abnormality. No evidence of pulmonary embolism or acute pulmonary abnormality. Patient has chronic pulmonary changes with septal thickening in the lower lobes without evidence of acute consolidation. Fl Small Bowel Follow Through Only    Result Date: 10/17/2020  EXAMINATION: SMALL BOWEL FOLLOW THROUGH SERIES 10/17/2020 TECHNIQUE: Small bowel follow through series was performed with overhead images and spot images. FLUOROSCOPY DOSE AND TYPE OR TIME AND EXPOSURES: None. COMPARISON: KUB performed earlier today. HISTORY: ORDERING SYSTEM PROVIDED HISTORY: SBO TECHNOLOGIST PROVIDED HISTORY: SBO Reason for Exam: SBO Acuity: Acute Type of Exam: Unknown FINDINGS: Water-soluble contrast was utilized.   Oral contrast confidently reached the colon within 3 hours. Duodenum appears normal in position. Normal fold pattern is seen. The previously identified prominent small bowel loop within the mid abdomen is not well appreciated on the small-bowel series. No splaying of bowel loops is noted. Unremarkable small bowel follow through series. Consultations:    Consults:     Final Specialist Recommendations/Findings:   IP CONSULT TO GENERAL SURGERY  IP CONSULT TO INTERNAL MEDICINE      The patient was seen and examined on day of discharge and this discharge summary is in conjunction with any daily progress note from day of discharge. Discharge plan:     Disposition: Home    Instructions to Patient: Keep follow-up appointments      Requiring Further Evaluation/Follow Up POST HOSPITALIZATION/Incidental Findings:    Physician Follow Up:     No follow-up provider specified. Diet: Regular    Activity: As tolerated    Discharge Medications:      Medication List      CONTINUE taking these medications    aspirin 81 MG chewable tablet  Commonly known as:  Aspirin Childrens  Take 1 tablet by mouth daily     FLUoxetine 20 MG capsule  Commonly known as:  PROZAC  Take 1 capsule by mouth BID     OSCAL 500/200 D-3 PO     traZODone 100 MG tablet  Commonly known as:  DESYREL  Take 1 tablet by mouth once daily     vitamin C 500 MG tablet  Commonly known as:  ASCORBIC ACID        STOP taking these medications    simvastatin 40 MG tablet  Commonly known as:  ZOCOR            Time Spent on discharge is  35 mins in patient examination, evaluation, counseling as well as medication reconciliation, prescriptions for required medications, discharge plan and follow up. Electronically signed by   Ines Whittaker MD  10/19/2020  11:58 AM      Thank you Dr. Cristian Hwang MD for the opportunity to be involved in this patient's care.

## 2020-10-19 NOTE — PLAN OF CARE
Problem: Pain:  Goal: Pain level will decrease  Description: Pain level will decrease  Outcome: Ongoing     Problem: Pain:  Goal: Control of acute pain  Description: Control of acute pain  Outcome: Ongoing     Problem: Falls - Risk of:  Goal: Will remain free from falls  Description: Will remain free from falls  Outcome: Met This Shift   Pt. Free of falls and injuries this shift. Problem: Falls - Risk of:  Goal: Absence of physical injury  Description: Absence of physical injury  Outcome: Met This Shift   No injuries this shift. Patient's safety maintained. Problem: Skin Integrity:  Goal: Will show no infection signs and symptoms  Description: Will show no infection signs and symptoms  Outcome: Met This Shift  No active infections    Problem: Skin Integrity:  Goal: Absence of new skin breakdown  Description: Absence of new skin breakdown  Outcome: Met This Shift   Skin assessment as charted. No evidence of new skin breakdown.

## 2020-10-19 NOTE — PROGRESS NOTES
Patient was seen and examined. Awake alert in no acute distress. Afebrile vital signs are stable. Loose bowel movements. Tolerating full liquids. Abdomen is benign. Extremity nontender. Hemoglobin is stable. No further signs of bleeding. Advance diet. Surgically stable for discharge. Outpatient colonoscopy in the next few days. Discussed with the patient and the  at length. Discussed with Dr. Darcy Louis and the charge nurse.

## 2020-10-20 ENCOUNTER — CARE COORDINATION (OUTPATIENT)
Dept: CASE MANAGEMENT | Age: 65
End: 2020-10-20

## 2020-10-20 NOTE — CARE COORDINATION
Patient contacted regarding recent visit for viral symptoms. This Selwyn Lan contacted the patient by telephone to perform post discharge call. Verified name and  with patient as identifiers. Provided introduction to self, and reason for call due to viral symptoms of infection and/or exposure to COVID-19. Patient presented to emergency department/flu clinic with complaints of viral symptoms/exposure to COVID. Patient reports symptoms are improving. Due to no new or worsening symptoms the RN CTN/ACM was not notified for escalation. Discussed exposure protocols and quarantine with CDC Guidelines What To Do If You Are Sick    Patient was given an opportunity for questions and concerns. Stay home except to get medical care    Separate yourself from other people and animals in your home    Call ahead before visiting your doctor    Wear a facemask    Cover your coughs and sneezes    Clean your hands often    Avoid sharing personal household items    Clean all high-touch surfaces everyday    Monitor your symptoms  Seek prompt medical attention if your illness is worsening (e.g., difficulty breathing). Before seeking care, call your healthcare provider and tell them that you have, or are being evaluated for, COVID-19. Put on a facemask before you enter the facility. These steps will help the healthcare provider's office to keep other people in the office or waiting room from getting infected or exposed. Ask your healthcare provider to call the local or [facility] health department. Persons who are placed under If you have a medical emergency and need to call 911, notify the dispatch personnel that you have, or are being evaluated for COVID-19. If possible, put on a facemask before emergency medical services arrive. The patient agrees to contact the [facility] exposure line [phone], local health department [place] [facility]: ([phone]) and PCP office for questions related to their healthcare. Author provided contact information for future reference. Patient/family/caregiver given information for [employer] and agrees to enroll {Blank Single Select Template:20061::\"yes\"  Patient's preferred [email or web address]    Patient's preferred phone number: [phone]  Based on Loop alert triggers, patient will be contacted by nurse care manager for worsening symptoms.     Jung Serrato, [address]  Care Coordination Transition

## 2020-10-22 NOTE — TELEPHONE ENCOUNTER
Med pended.
Medrol Dosepak
Pt asks if she can receive a medication for poison ivy all along her arms. Please send to [address].
Good

## 2020-11-25 ENCOUNTER — HOSPITAL ENCOUNTER (OUTPATIENT)
Dept: PREADMISSION TESTING | Age: 65
Setting detail: SPECIMEN
Discharge: HOME | End: 2020-11-29
Payer: MEDICARE

## 2020-11-25 ENCOUNTER — HOSPITAL ENCOUNTER (OUTPATIENT)
Age: 65
Setting detail: SPECIMEN
Discharge: HOME | End: 2020-11-25
Payer: MEDICARE

## 2020-11-29 LAB — SARS-COV-2, NAA: NOT DETECTED

## 2020-11-30 ENCOUNTER — HOSPITAL ENCOUNTER (OUTPATIENT)
Age: 65
Setting detail: OUTPATIENT SURGERY
Discharge: HOME | End: 2020-11-30
Attending: SURGERY | Admitting: SURGERY
Payer: MEDICARE

## 2020-11-30 ENCOUNTER — ANESTHESIA EVENT (OUTPATIENT)
Dept: ENDOSCOPY | Age: 65
End: 2020-11-30
Payer: MEDICARE

## 2020-11-30 ENCOUNTER — ANESTHESIA (OUTPATIENT)
Dept: ENDOSCOPY | Age: 65
End: 2020-11-30
Payer: MEDICARE

## 2020-11-30 VITALS
SYSTOLIC BLOOD PRESSURE: 109 MMHG | TEMPERATURE: 96.4 F | OXYGEN SATURATION: 100 % | DIASTOLIC BLOOD PRESSURE: 57 MMHG | RESPIRATION RATE: 14 BRPM

## 2020-11-30 VITALS
BODY MASS INDEX: 31.39 KG/M2 | TEMPERATURE: 97.2 F | OXYGEN SATURATION: 98 % | SYSTOLIC BLOOD PRESSURE: 132 MMHG | WEIGHT: 200 LBS | DIASTOLIC BLOOD PRESSURE: 49 MMHG | HEIGHT: 67 IN | RESPIRATION RATE: 20 BRPM | HEART RATE: 83 BPM

## 2020-11-30 PROCEDURE — 3609010400 HC COLONOSCOPY POLYPECTOMY HOT BIOPSY: Performed by: SURGERY

## 2020-11-30 PROCEDURE — 3700000000 HC ANESTHESIA ATTENDED CARE: Performed by: SURGERY

## 2020-11-30 PROCEDURE — 7100000001 HC PACU RECOVERY - ADDTL 15 MIN: Performed by: SURGERY

## 2020-11-30 PROCEDURE — 7100000000 HC PACU RECOVERY - FIRST 15 MIN: Performed by: SURGERY

## 2020-11-30 PROCEDURE — 6360000002 HC RX W HCPCS: Performed by: NURSE ANESTHETIST, CERTIFIED REGISTERED

## 2020-11-30 PROCEDURE — 3700000001 HC ADD 15 MINUTES (ANESTHESIA): Performed by: SURGERY

## 2020-11-30 PROCEDURE — 2500000003 HC RX 250 WO HCPCS: Performed by: NURSE ANESTHETIST, CERTIFIED REGISTERED

## 2020-11-30 PROCEDURE — 2709999900 HC NON-CHARGEABLE SUPPLY: Performed by: SURGERY

## 2020-11-30 PROCEDURE — 2580000003 HC RX 258: Performed by: ANESTHESIOLOGY

## 2020-11-30 PROCEDURE — 88305 TISSUE EXAM BY PATHOLOGIST: CPT

## 2020-11-30 RX ORDER — EPHEDRINE SULFATE/0.9% NACL/PF 50 MG/5 ML
SYRINGE (ML) INTRAVENOUS PRN
Status: DISCONTINUED | OUTPATIENT
Start: 2020-11-30 | End: 2020-11-30 | Stop reason: SDUPTHER

## 2020-11-30 RX ORDER — SODIUM CHLORIDE, SODIUM LACTATE, POTASSIUM CHLORIDE, CALCIUM CHLORIDE 600; 310; 30; 20 MG/100ML; MG/100ML; MG/100ML; MG/100ML
INJECTION, SOLUTION INTRAVENOUS CONTINUOUS
Status: DISCONTINUED | OUTPATIENT
Start: 2020-11-30 | End: 2020-11-30 | Stop reason: HOSPADM

## 2020-11-30 RX ORDER — FENTANYL CITRATE 50 UG/ML
INJECTION, SOLUTION INTRAMUSCULAR; INTRAVENOUS PRN
Status: DISCONTINUED | OUTPATIENT
Start: 2020-11-30 | End: 2020-11-30 | Stop reason: SDUPTHER

## 2020-11-30 RX ORDER — MIDAZOLAM HYDROCHLORIDE 1 MG/ML
INJECTION INTRAMUSCULAR; INTRAVENOUS PRN
Status: DISCONTINUED | OUTPATIENT
Start: 2020-11-30 | End: 2020-11-30 | Stop reason: SDUPTHER

## 2020-11-30 RX ORDER — LIDOCAINE HYDROCHLORIDE 10 MG/ML
INJECTION, SOLUTION EPIDURAL; INFILTRATION; INTRACAUDAL; PERINEURAL PRN
Status: DISCONTINUED | OUTPATIENT
Start: 2020-11-30 | End: 2020-11-30 | Stop reason: SDUPTHER

## 2020-11-30 RX ORDER — PROPOFOL 10 MG/ML
INJECTION, EMULSION INTRAVENOUS PRN
Status: DISCONTINUED | OUTPATIENT
Start: 2020-11-30 | End: 2020-11-30 | Stop reason: SDUPTHER

## 2020-11-30 RX ORDER — DIPHENHYDRAMINE HYDROCHLORIDE 50 MG/ML
12.5 INJECTION INTRAMUSCULAR; INTRAVENOUS
Status: DISCONTINUED | OUTPATIENT
Start: 2020-11-30 | End: 2020-11-30 | Stop reason: HOSPADM

## 2020-11-30 RX ORDER — HYDROMORPHONE HYDROCHLORIDE 8 MG/1
8 TABLET, EXTENDED RELEASE ORAL 2 TIMES DAILY
COMMUNITY
End: 2021-04-08

## 2020-11-30 RX ORDER — HYDRALAZINE HYDROCHLORIDE 20 MG/ML
5 INJECTION INTRAMUSCULAR; INTRAVENOUS EVERY 10 MIN PRN
Status: DISCONTINUED | OUTPATIENT
Start: 2020-11-30 | End: 2020-11-30 | Stop reason: HOSPADM

## 2020-11-30 RX ORDER — 0.9 % SODIUM CHLORIDE 0.9 %
500 INTRAVENOUS SOLUTION INTRAVENOUS
Status: DISCONTINUED | OUTPATIENT
Start: 2020-11-30 | End: 2020-11-30 | Stop reason: HOSPADM

## 2020-11-30 RX ORDER — LABETALOL HYDROCHLORIDE 5 MG/ML
5 INJECTION, SOLUTION INTRAVENOUS EVERY 10 MIN PRN
Status: DISCONTINUED | OUTPATIENT
Start: 2020-11-30 | End: 2020-11-30 | Stop reason: HOSPADM

## 2020-11-30 RX ORDER — ONDANSETRON 2 MG/ML
INJECTION INTRAMUSCULAR; INTRAVENOUS PRN
Status: DISCONTINUED | OUTPATIENT
Start: 2020-11-30 | End: 2020-11-30 | Stop reason: SDUPTHER

## 2020-11-30 RX ORDER — PREGABALIN 150 MG/1
150 CAPSULE ORAL 2 TIMES DAILY
COMMUNITY

## 2020-11-30 RX ORDER — ONDANSETRON 2 MG/ML
4 INJECTION INTRAMUSCULAR; INTRAVENOUS
Status: DISCONTINUED | OUTPATIENT
Start: 2020-11-30 | End: 2020-11-30 | Stop reason: HOSPADM

## 2020-11-30 RX ORDER — PROPOFOL 10 MG/ML
INJECTION, EMULSION INTRAVENOUS PRN
Status: DISCONTINUED | OUTPATIENT
Start: 2020-11-30 | End: 2020-11-30

## 2020-11-30 RX ORDER — DEXAMETHASONE SODIUM PHOSPHATE 4 MG/ML
INJECTION, SOLUTION INTRA-ARTICULAR; INTRALESIONAL; INTRAMUSCULAR; INTRAVENOUS; SOFT TISSUE PRN
Status: DISCONTINUED | OUTPATIENT
Start: 2020-11-30 | End: 2020-11-30 | Stop reason: SDUPTHER

## 2020-11-30 RX ORDER — PROMETHAZINE HYDROCHLORIDE 25 MG/ML
6.25 INJECTION, SOLUTION INTRAMUSCULAR; INTRAVENOUS
Status: DISCONTINUED | OUTPATIENT
Start: 2020-11-30 | End: 2020-11-30 | Stop reason: HOSPADM

## 2020-11-30 RX ADMIN — Medication 20 MG: at 10:26

## 2020-11-30 RX ADMIN — PROPOFOL 200 MG: 10 INJECTION, EMULSION INTRAVENOUS at 10:20

## 2020-11-30 RX ADMIN — LIDOCAINE HYDROCHLORIDE 50 MG: 10 INJECTION, SOLUTION EPIDURAL; INFILTRATION; INTRACAUDAL; PERINEURAL at 10:20

## 2020-11-30 RX ADMIN — FENTANYL CITRATE 50 MCG: 50 INJECTION, SOLUTION INTRAMUSCULAR; INTRAVENOUS at 10:20

## 2020-11-30 RX ADMIN — ONDANSETRON 4 MG: 2 INJECTION INTRAMUSCULAR; INTRAVENOUS at 10:26

## 2020-11-30 RX ADMIN — SODIUM CHLORIDE, POTASSIUM CHLORIDE, SODIUM LACTATE AND CALCIUM CHLORIDE: 600; 310; 30; 20 INJECTION, SOLUTION INTRAVENOUS at 10:03

## 2020-11-30 RX ADMIN — MIDAZOLAM 2 MG: 1 INJECTION INTRAMUSCULAR; INTRAVENOUS at 10:15

## 2020-11-30 RX ADMIN — DEXAMETHASONE SODIUM PHOSPHATE 4 MG: 4 INJECTION, SOLUTION INTRA-ARTICULAR; INTRALESIONAL; INTRAMUSCULAR; INTRAVENOUS; SOFT TISSUE at 10:26

## 2020-11-30 ASSESSMENT — PULMONARY FUNCTION TESTS
PIF_VALUE: 5
PIF_VALUE: 12
PIF_VALUE: 0
PIF_VALUE: 10
PIF_VALUE: 11
PIF_VALUE: 3
PIF_VALUE: 12
PIF_VALUE: 0
PIF_VALUE: 12
PIF_VALUE: 10
PIF_VALUE: 11
PIF_VALUE: 10
PIF_VALUE: 0
PIF_VALUE: 12
PIF_VALUE: 11
PIF_VALUE: 14
PIF_VALUE: 13
PIF_VALUE: 8
PIF_VALUE: 12
PIF_VALUE: 11
PIF_VALUE: 1
PIF_VALUE: 11
PIF_VALUE: 12
PIF_VALUE: 1
PIF_VALUE: 3
PIF_VALUE: 12
PIF_VALUE: 11

## 2020-11-30 ASSESSMENT — PAIN DESCRIPTION - DESCRIPTORS: DESCRIPTORS: ACHING;SHARP;SHOOTING

## 2020-11-30 ASSESSMENT — PAIN SCALES - WONG BAKER: WONGBAKER_NUMERICALRESPONSE: 0

## 2020-11-30 ASSESSMENT — PAIN - FUNCTIONAL ASSESSMENT: PAIN_FUNCTIONAL_ASSESSMENT: 0-10

## 2020-11-30 NOTE — ANESTHESIA POSTPROCEDURE EVALUATION
Department of Anesthesiology  Postprocedure Note    Patient: Hi Chapin  MRN: [id number]  YOB: 1955  Date of evaluation: 11/30/2020  Time:  11:39 AM     Procedure Summary     Date:  11/30/20 Room / Location:  [facility] ENDO 04 / [facility] ENDO    Anesthesia Start:  0761 Anesthesia Stop:  4576    Procedure:  COLONOSCOPY POLYPECTOMY HOT SNARE (N/A ) Diagnosis:  (CHANGE IN BOWEL HABIT (PAT ON ADMIT - OFFICE TO [facility]))    Surgeon:  Chin Soto MD Responsible Provider:  Kellee Calderon MD    Anesthesia Type:  general ASA Status:  2          Anesthesia Type: general    Sangita Phase I: Sangita Score: 10    Sangita Phase II:      Last vitals: Reviewed and per EMR flowsheets.        Anesthesia Post Evaluation    Comments: POST- ANESTHESIA EVALUATION       Pt Name: Hi Chapin  MRN: [id number]  [place]: 1955  Date of evaluation: 11/30/2020  Time:  11:40 AM      BP (!) 132/49   Pulse 83   Temp 97.2 °F (36.2 °C) (Infrared)   Resp 20   Ht 5' 7\" (1.702 m)   Wt 200 lb (90.7 kg)   SpO2 98%   BMI 31.32 kg/m²      Consciousness Level  Awake  Cardiopulmonary Status  Stable  Pain Adequately Treated YES  Nausea / Vomiting  NO  Adequate Hydration  YES  Anesthesia Related Complications NONE      Electronically signed by Kellee Calderon MD on 11/30/2020 at 11:40 AM

## 2020-11-30 NOTE — H&P
HISTORY and [facility] [address]       NAME:  Efrain Gomez  MRN: [id number]   YOB: 1955   Date: 11/30/2020   Age: 72 y.o. Gender: female       COMPLAINT AND PRESENT HISTORY:   Efrain Gomez is 72 y.o.,   female, having a Diagnostic Colonoscopy. Prior Colonoscopy was done 18 years ago. Patient denies any  FH of Colon Cancer. Patient reports changes in bowel habits. She has had diarrhea  and GI /Rectal bleeding three week ago and she admitted to the hospital. She was diagnosed with small bowel obstruction, NG tube inserted. Patient denies any Dysphagia, no  GERD. Today she denies abdominal pain, no nausea or vomiting , she reports passing gas, no diarrhea since she left the hospital three weeks ago. Pt  Denies fever/chills, chest pain, no SOB, no palpitation or headache  Pt reports her BM today is clear orange color after she finished her colon prep yesterday. Pt denies any problem with anesthesia , no Hx of infection . Pt Npo since the past midnight , no am medication today. The last time she took aspirin three weeks ago.      PAST MEDICAL HISTORY     Past Medical History:   Diagnosis Date    Arthritis     Depression     Displacement of intervertebral disc, site unspecified, without myelopathy     [facility]    Displacement of lumbar intervertebral disc without myelopathy     [facility]    Hyperlipidemia     Lumbago     [facility]    Sprain of lumbar region     [facility]    Sprain of lumbosacral (joint) (ligament)     [facility]    Sprain of sacrum     [facility]       SURGICAL HISTORY       Past Surgical History:   Procedure Laterality Date    ABDOMINAL EXPLORATION SURGERY      BACK SURGERY      X2    COLON SURGERY      COLONOSCOPY      DILATION AND CURETTAGE OF UTERUS      FOOT SURGERY Right     HAND SURGERY Right 6/18/14    OVARY REMOVAL Bilateral     WRIST ARTHROPLASTY Left        FAMILY HISTORY       Family History   Problem Relation Age of Onset    Heart Disease Father    Ardyth Moritz High Blood Pressure Father        SOCIAL HISTORY       Social History     Socioeconomic History    Marital status:      Spouse name: Not on file    Number of children: Not on file    Years of education: Not on file    Highest education level: Not on file   Occupational History     Employer: Not Employed   Social Needs    Financial resource strain: Not on file    Food insecurity     Worry: Not on file     Inability: Not on file    Transportation needs     Medical: Not on file     Non-medical: Not on file   Tobacco Use    Smoking status: Former Smoker     Packs/day: 0.25     Years: 15.00     Pack years: 3.75     Types: Cigarettes     Last attempt to quit: 2019     Years since quittin.2    Smokeless tobacco: Never Used    Tobacco comment: quit 37 years ago   Substance and Sexual Activity    Alcohol use:  Yes     Alcohol/week: 2.0 standard drinks     Types: 2 Cans of beer per week     Comment: OCCASSION    Drug use: No    Sexual activity: Yes     Partners: Male   Lifestyle    Physical activity     Days per week: Not on file     Minutes per session: Not on file    Stress: Not on file   Relationships    Social connections     Talks on phone: Not on file     Gets together: Not on file     Attends [employer] service: Not on file     Active member of club or organization: Not on file     Attends meetings of clubs or organizations: Not on file     Relationship status: Not on file    Intimate partner violence     Fear of current or ex partner: Not on file     Emotionally abused: Not on file     Physically abused: Not on file     Forced sexual activity: Not on file   Other Topics Concern    Not on file   Social History Narrative    Not on file           REVIEW OF SYSTEMS      Allergies   Allergen Reactions    Ambien [Zolpidem Tartrate] Shortness Of Breath    Ativan [Lorazepam] Shortness Of Breath    Darvon [Propoxyphene] Other (See Comments)     Mental changes    Restoril [Temazepam]     Topamax [Topiramate]        No current facility-administered medications on file prior to encounter. Current Outpatient Medications on File Prior to Encounter   Medication Sig Dispense Refill    vitamin C (ASCORBIC ACID) 500 MG tablet Take 1,000 mg by mouth 2 times daily      traZODone (DESYREL) 100 MG tablet Take 1 tablet by mouth once daily 30 tablet 0    FLUoxetine (PROZAC) 20 MG capsule Take 1 capsule by mouth BID 60 capsule 5    Calcium Carbonate-Vitamin D (OSCAL 500/200 D-3 PO) Take 500 mg by mouth daily      aspirin (ASPIRIN CHILDRENS) 81 MG chewable tablet Take 1 tablet by mouth daily 30 tablet 0       Negative except for what is mentioned in the HPI. GENERAL PHYSICAL EXAM     Vitals: see nursing flow sheet for vital sings    GENERAL APPEARANCE:   Ross Buenrostro is 72 y.o.,  female, nourished, conscious, alert. Does not appear to be distress or pain at this time. SKIN:  Warm, dry, no cyanosis or jaundice. HEAD:  Normocephalic, atraumatic, no swelling or tenderness. EYES:  Pupils equal, reactive to light. EARS:  No discharge, no marked hearing loss. NOSE:  No rhinorrhea, epistaxis or septal deformity. THROAT:  Not congested. No ulceration bleeding or discharge. NECK:  No stiffness, trachea central.  No palpable masses or L.N.                 CHEST:  Symmetrical and equal on expansion. HEART:  RRR S1 > S2. No audible murmurs or gallops. LUNGS:  Equal on expansion, normal breath sounds. No adventitious sounds. ABDOMEN:   Soft on palpation. No localized tenderness. No guarding or rigidity. No palpable hepatosplenomegaly. Good bowel sounds. LYMPHATICS:  No palpable cervical lymphadenopathy. LOCOMOTOR, BACK AND SPINE:  No tenderness or deformities. Pt was on wheel chair due to lower back pain.

## 2020-11-30 NOTE — OP NOTE
Operative Note      Patient: Aquilino Palafox  YOB: 1955  MRN: [id number]    PROCEDURE NOTE    DATE OF PROCEDURE: 11/30/2020    SURGEON: Sonny Vogel MD    ASSISTANT: None    PREOPERATIVE DIAGNOSIS: Change in bowel habits    POSTOPERATIVE DIAGNOSIS: Patent anastomosis previous sigmoid colectomy/scattered diverticulosis/benign-appearing ascending colon polyp    OPERATION: Total colonoscopy to cecum with intubation of terminal ileum and hot snare polypectomy benign-appearing proximal ascending colon polyp    ANESTHESIA: General    ESTIMATED BLOOD LOSS: None    COMPLICATIONS: None     SPECIMENS:  Was Obtained: Proximal ascending colon polyp    HISTORY: The patient is a 72y.o. year old female with history of above preop diagnosis. I recommended colonoscopy with possible biopsy or polypectomy and I explained the risk, benefits, expected outcome, and alternatives to the procedure. Risks included but are not limited to bleeding, infection, respiratory distress, hypotension, and perforation of the colon and possibility of missing a lesion. The patient understands and is in agreement. PROCEDURE: The patient was given IV conscious sedation. The patient's SPO2 remained above 90% throughout the procedure. Digital rectal exam was normal.  The colonoscope was inserted through the anus into the rectum and advanced under direct vision to the cecum without difficulty. Terminal ileum was examined for approximately 2 inches. The prep was good. Findings:  Terminal ileum: normal    Cecum/Ascending colon: abnormal: Proximal ascending colon polyp removed and retrieved with hot snare polypectomy technique. Few scattered diverticula. Transverse colon: abnormal: Few scattered diverticula. Descending/Sigmoid colon: abnormal: Previous sigmoid possibly left hemicolectomy several years ago with patent anastomosis.     Rectum/Anus: examined in normal and retroflexed positions and was normal    Withdrawal Time was (minutes): 16      Next screening colonoscopy: 3 years. If screening is less than 10 years the recommended reason is due:polyps    The colon was decompressed. While withdrawing the scope the above findings were verified and the scope was removed. The patient tolerated the procedure and conscious sedation without unusual events. In the recovery room patient was examined and remains hemodynamically stable. Discharge home when criteria met. Recommendations/Plan:   1. F/U Biopsies  2. F/U In Office as instructed  3. Discussed with the family  4. High fiber diet   5.  Precautions to avoid constipation    Electronically signed by Yumi Mendez MD  on 11/30/2020 at 10:44 AM

## 2020-11-30 NOTE — ANESTHESIA PRE PROCEDURE
Department of Anesthesiology  Preprocedure Note       Name:  Brittany Westfall   Age:  72 y.o.  :  1955                                          MRN:  [id number]         Date:  2020      Surgeon: Leonidas Rainesor):  Albert Ricci MD    Procedure: Procedure(s):  COLONOSCOPY DIAGNOSTIC    Medications prior to admission:   Prior to Admission medications    Medication Sig Start Date End Date Taking? Authorizing Provider   vitamin C (ASCORBIC ACID) 500 MG tablet Take 1,000 mg by mouth 2 times daily    Historical Provider, MD   traZODone (DESYREL) 100 MG tablet Take 1 tablet by mouth once daily 20   Janine Barry DO   FLUoxetine (PROZAC) 20 MG capsule Take 1 capsule by mouth BID 7/15/20   Ruben Hwang MD   Calcium Carbonate-Vitamin D (OSCAL 500/200 D-3 PO) Take 500 mg by mouth daily    Historical Provider, MD   aspirin (ASPIRIN CHILDRENS) 81 MG chewable tablet Take 1 tablet by mouth daily 17   Allison Anton MD       Current medications:    Current Facility-Administered Medications   Medication Dose Route Frequency Provider Last Rate Last Dose    lactated ringers infusion   Intravenous Continuous Yolanda Watkins MD           Allergies: Allergies   Allergen Reactions    Ambien [Zolpidem Tartrate] Shortness Of Breath    Ativan [Lorazepam] Shortness Of Breath    Darvon [Propoxyphene] Other (See Comments)     Mental changes    Restoril [Temazepam]     Topamax [Topiramate]        Problem List:    Patient Active Problem List   Diagnosis Code    Osteoarthritis of hand M19.049    Sprain of lumbar region S33. 5XXA    Displacement of lumbar intervertebral disc without myelopathy M51.26    Lumbago M54.5    Sprain of sacrum S33. 8XXA    Displacement of intervertebral disc, site unspecified, without myelopathy [id number]    Sprain of ligament of lumbosacral joint S33. 9XXA    Pleurisy R09.1    Acute bronchitis with asthma J20.9, J45.909    Excessive sweating R61    Acute pharyngitis J02.9    Generalized anxiety disorder F41.1    Postlaminectomy syndrome, lumbar region M96.1    Obesity (BMI 30-39. 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    Dizziness R42    Costochondritis M94.0    Small bowel obstruction ([address] K56.609       Past Medical History:        Diagnosis Date    Arthritis     Depression     Displacement of intervertebral disc, site unspecified, without myelopathy     [facility]    Displacement of lumbar intervertebral disc without myelopathy     bwc    Hyperlipidemia     Lumbago     [facility]    Sprain of lumbar region     bwc    Sprain of lumbosacral (joint) (ligament)     [facility]    Sprain of sacrum     [facility]       Past Surgical History:        Procedure Laterality Date    ABDOMINAL EXPLORATION SURGERY      BACK SURGERY      X2    COLON SURGERY      COLONOSCOPY      DILATION AND CURETTAGE OF UTERUS      FOOT SURGERY Right     HAND SURGERY Right 14    OVARY REMOVAL Bilateral     WRIST ARTHROPLASTY Left        Social History:    Social History     Tobacco Use    Smoking status: Former Smoker     Packs/day: 0.25     Years: 15.00     Pack years: 3.75     Types: Cigarettes     Last attempt to quit: 2019     Years since quittin.2    Smokeless tobacco: Never Used    Tobacco comment: quit 37 years ago   Substance Use Topics    Alcohol use: Yes     Alcohol/week: 2.0 standard drinks     Types: 2 Cans of beer per week     Comment: OCCASSION                                Counseling given: Not Answered  Comment: quit 37 years ago      Vital Signs (Current): There were no vitals filed for this visit.                                            BP Readings from Last 3 Encounters:   10/19/20 (!) 131/56   20 118/76   20 (!) 110/52       NPO Status:                                                                                 BMI:   Wt Readings from Last 3 Encounters:   10/19/20 179 lb (81.2 kg)   20 192 lb (87.1 kg)   20 194 lb 9.6 oz (88.3 kg)     There is no height or weight on file to calculate BMI.    CBC:   Lab Results   Component Value Date    WBC 5.1 10/19/2020    RBC 3.48 10/19/2020    RBC 4.42 06/02/2012    HGB 11.1 10/19/2020    HCT 33.4 10/19/2020    MCV 96.0 10/19/2020    RDW 14.0 10/19/2020     10/19/2020     06/02/2012       CMP:   Lab Results   Component Value Date     10/19/2020    K 3.8 10/19/2020     10/19/2020    CO2 24 10/19/2020    BUN 5 10/19/2020    CREATININE 0.49 10/19/2020    GFRAA >60 10/19/2020    LABGLOM >60 10/19/2020    GLUCOSE 89 10/19/2020    GLUCOSE 97 06/02/2012    PROT 7.3 10/16/2020    CALCIUM 8.8 10/19/2020    BILITOT 0.24 10/16/2020    ALKPHOS 70 10/16/2020    AST 19 10/16/2020    ALT 12 10/16/2020       POC Tests: No results for input(s): POCGLU, POCNA, POCK, POCCL, POCBUN, POCHEMO, POCHCT in the last 72 hours.     Coags: No results found for: PROTIME, INR, APTT    HCG (If Applicable): No results found for: PREGTESTUR, PREGSERUM, HCG, HCGQUANT     ABGs: No results found for: PHART, PO2ART, OGU3EIC, HGM6ITY, BEART, D1XZPCLT     Type & Screen (If Applicable):  No results found for: LABABO, LABRH    Drug/Infectious Status (If Applicable):  No results found for: HIV, HEPCAB    COVID-19 Screening (If Applicable):   Lab Results   Component Value Date    COVID19 Not Detected 11/25/2020         Anesthesia Evaluation  Patient summary reviewed and Nursing notes reviewed no history of anesthetic complications:   Airway: Mallampati: II  TM distance: >3 FB   Neck ROM: full  Mouth opening: > = 3 FB Dental: normal exam         Pulmonary:normal exam  breath sounds clear to auscultation  (+) asthma:                            Cardiovascular:  Exercise tolerance: good (>4 METS),   (+) hyperlipidemia      ECG reviewed  Rhythm: regular  Rate: normal                 ROS comment: Normal sinus rhythm  Nonspecific T wave abnormality  Abnormal ECG     Neuro/Psych:   (+) psychiatric history: stable with treatmentdepression/anxiety

## 2020-12-01 LAB — SURGICAL PATHOLOGY REPORT: NORMAL

## 2020-12-14 ENCOUNTER — OFFICE VISIT (OUTPATIENT)
Dept: FAMILY MEDICINE CLINIC | Age: 65
End: 2020-12-14
Payer: MEDICARE

## 2020-12-14 VITALS
TEMPERATURE: 97.3 F | BODY MASS INDEX: 30.57 KG/M2 | OXYGEN SATURATION: 92 % | WEIGHT: 195.2 LBS | SYSTOLIC BLOOD PRESSURE: 123 MMHG | HEART RATE: 69 BPM | DIASTOLIC BLOOD PRESSURE: 65 MMHG

## 2020-12-14 PROBLEM — J44.9 CHRONIC OBSTRUCTIVE PULMONARY DISEASE (HCC): Status: ACTIVE | Noted: 2020-12-14

## 2020-12-14 LAB — HBA1C MFR BLD: 5.4 %

## 2020-12-14 PROCEDURE — G8926 SPIRO NO PERF OR DOC: HCPCS | Performed by: FAMILY MEDICINE

## 2020-12-14 PROCEDURE — G8400 PT W/DXA NO RESULTS DOC: HCPCS | Performed by: FAMILY MEDICINE

## 2020-12-14 PROCEDURE — 90715 TDAP VACCINE 7 YRS/> IM: CPT | Performed by: FAMILY MEDICINE

## 2020-12-14 PROCEDURE — 83036 HEMOGLOBIN GLYCOSYLATED A1C: CPT | Performed by: FAMILY MEDICINE

## 2020-12-14 PROCEDURE — 90471 IMMUNIZATION ADMIN: CPT | Performed by: FAMILY MEDICINE

## 2020-12-14 PROCEDURE — G8417 CALC BMI ABV UP PARAM F/U: HCPCS | Performed by: FAMILY MEDICINE

## 2020-12-14 PROCEDURE — G8482 FLU IMMUNIZE ORDER/ADMIN: HCPCS | Performed by: FAMILY MEDICINE

## 2020-12-14 PROCEDURE — 1123F ACP DISCUSS/DSCN MKR DOCD: CPT | Performed by: FAMILY MEDICINE

## 2020-12-14 PROCEDURE — 1090F PRES/ABSN URINE INCON ASSESS: CPT | Performed by: FAMILY MEDICINE

## 2020-12-14 PROCEDURE — 1036F TOBACCO NON-USER: CPT | Performed by: FAMILY MEDICINE

## 2020-12-14 PROCEDURE — 3023F SPIROM DOC REV: CPT | Performed by: FAMILY MEDICINE

## 2020-12-14 PROCEDURE — 4040F PNEUMOC VAC/ADMIN/RCVD: CPT | Performed by: FAMILY MEDICINE

## 2020-12-14 PROCEDURE — 3017F COLORECTAL CA SCREEN DOC REV: CPT | Performed by: FAMILY MEDICINE

## 2020-12-14 PROCEDURE — 99214 OFFICE O/P EST MOD 30 MIN: CPT | Performed by: FAMILY MEDICINE

## 2020-12-14 PROCEDURE — G8427 DOCREV CUR MEDS BY ELIG CLIN: HCPCS | Performed by: FAMILY MEDICINE

## 2020-12-14 RX ORDER — ZOSTER VACCINE RECOMBINANT, ADJUVANTED 50 MCG/0.5
0.5 KIT INTRAMUSCULAR SEE ADMIN INSTRUCTIONS
Qty: 0.5 ML | Refills: 1 | Status: ON HOLD | OUTPATIENT
Start: 2020-12-14 | End: 2021-04-11 | Stop reason: HOSPADM

## 2020-12-14 RX ORDER — TRAZODONE HYDROCHLORIDE 100 MG/1
TABLET ORAL
Qty: 30 TABLET | Refills: 3 | Status: SHIPPED | OUTPATIENT
Start: 2020-12-14 | End: 2021-04-16 | Stop reason: SDUPTHER

## 2020-12-14 ASSESSMENT — PATIENT HEALTH QUESTIONNAIRE - PHQ9
1. LITTLE INTEREST OR PLEASURE IN DOING THINGS: 0
2. FEELING DOWN, DEPRESSED OR HOPELESS: 0
SUM OF ALL RESPONSES TO PHQ QUESTIONS 1-9: 0
SUM OF ALL RESPONSES TO PHQ QUESTIONS 1-9: 0
SUM OF ALL RESPONSES TO PHQ9 QUESTIONS 1 & 2: 0
SUM OF ALL RESPONSES TO PHQ QUESTIONS 1-9: 0

## 2020-12-14 NOTE — PROGRESS NOTES
2020     Kaylen Gonzales (:  1955) is a 72 y.o. female, here for evaluation of the following medical concerns:    HPI    71 yo female coming in today to establish care. Overall the patient does not have any concerns. She has been on disability since she had injury at work. Since then she has been having back problems she has been following up with Dr. Candance Hint a Workmen's Comp. specialist.    . 1 step son -- 4 grand kids and 1 great grand kid. No violence at the current living place. No concerns about sexual transmitted diseases. Tobacco use: None  Alcohol use: None  Other drug use: None  Depressed: depressed due to pain/feels that the medication the depression is overall well controlled. Mom 77 yo -  emphysema  Dad 77 yo -  MI  Siblings: 2 brother - well. Vaccinations: Needs to have some vaccinations. Mammogram: Ordered  Pap smear: NA  Colonoscopy: Did have a colonoscopy previously. Is due in . Review of Systems     Constitutional: Negative for fever and unexpected weight change. HENT: Negative for ear pain, congestion, sore throat and rhinorrhea. Eyes: Negative for itching and visual disturbance. Respiratory: Negative for cough and shortness of breath. Cardiovascular: Negative for chest pain and leg swelling. Gastrointestinal: Negative for diarrhea, constipation and blood in stool. Endocrine: Negative for polydipsia and polyuria. Genitourinary: Negative for dysuria and hematuria. Musculoskeletal: Negative for back pain and gait problem. Positive for chronic pain. Skin: Negative for color change and rash. Neurological: Negative for dizziness and headaches. Psychiatric/Behavioral: Negative for confusion and agitation. Positive for depression well controlled with current medications. Prior to Visit Medications    Medication Sig Taking?  Authorizing Provider   traZODone (DESYREL) 100 MG tablet 1 tab at night Yes Yinka Holden MD zoster recombinant adjuvanted vaccine (SHINGRIX) 50 MCG/0.5ML SUSR injection Inject 0.5 mLs into the muscle See Admin Instructions 1 dose now and repeat in 2-6 months Yes Eduard Melgar MD   HYDROmorphone (EXALGO) 8 MG TB24 extended release tablet Take 8 mg by mouth 2 times daily. Yes Historical Provider, MD   pregabalin (LYRICA) 100 MG capsule Take 100 mg by mouth 2 times daily. Yes Historical Provider, MD   vitamin C (ASCORBIC ACID) 500 MG tablet Take 1,000 mg by mouth 2 times daily Yes Historical Provider, MD   FLUoxetine (PROZAC) 20 MG capsule Take 1 capsule by mouth BID Yes Alejo Bender MD   Calcium Carbonate-Vitamin D (OSCAL 500/200 D-3 PO) Take 500 mg by mouth daily Yes Historical Provider, MD   aspirin (ASPIRIN CHILDRENS) 81 MG chewable tablet Take 1 tablet by mouth daily Yes Lilly Salmeron MD        Social History     Tobacco Use    Smoking status: Former Smoker     Packs/day: 0.25     Years: 15.00     Pack years: 3.75     Types: Cigarettes     Quit date: 2019     Years since quittin.2    Smokeless tobacco: Never Used    Tobacco comment: quit 37 years ago   Substance Use Topics    Alcohol use: Yes     Alcohol/week: 2.0 standard drinks     Types: 2 Cans of beer per week     Comment: OCCASSION        Vitals:    20 1446   BP: 123/65   Pulse: 69   Temp: 97.3 °F (36.3 °C)   SpO2: 92%   Weight: 195 lb 3.2 oz (88.5 kg)     Estimated body mass index is 30.57 kg/m² as calculated from the following:    Height as of 20: 5' 7\" (1.702 m). Weight as of this encounter: 195 lb 3.2 oz (88.5 kg). Physical Exam  HENT:   /65   Pulse 69   Temp 97.3 °F (36.3 °C)   Wt 195 lb 3.2 oz (88.5 kg)   SpO2 92%   BMI 30.57 kg/m²   Constitutional: Alert and oriented. Well-nourished. Head: Normocephalic and atraumatic. Right Ear: External ear normal. TM: no bulging, erythema or fluid seen. Left Ear: External ear normal. TM: no bulging, erythema or fluid seen. Nose: Nose normal.   Mouth/Throat: Mask in place. Eyes: Pupils are equal, round, and reactive to light. Right eye exhibits no discharge. Left eye exhibits no discharge. No scleral icterus. Neck: Normal range of motion. Neck supple. No JVD present. No tracheal deviation present. No thyromegaly present. Cardiovascular: Normal rate, regular rhythm, normal heart sounds. Pulmonary/Chest: Effort normal and breath sounds normal. No respiratory distress. She has no wheezes. She has no rales. Abdominal: Soft. Bowel sounds are normal.  She exhibits no distension and no mass. There is no tenderness. There is no rebound and no guarding. Musculoskeletal: Normal range of motion. She exhibits no edema or tenderness. Lymphadenopathy:    She has no cervical adenopathy. Neurological:  She is alert and oriented to person, place, and time. Cranial nerves grossly intact. No sensation problem noted. Muscle strength 5/5 throughout. Skin: Skin is warm and dry. No rash noted. No erythema. Psychiatric:  She has a normal mood and affect. Behavior is normal.    Eriberto Luz was seen today for new patient.     Diagnoses and all orders for this visit:    Encounter to establish care with new doctor    Prediabetes  -     POCT glycosylated hemoglobin (Hb A1C)    Chronic obstructive pulmonary disease, unspecified COPD type ([facility] [address]    Acute bronchitis with asthma    Strain of lumbar region, sequela  -     traZODone (DESYREL) 100 MG tablet; 1 tab at night    Displacement of lumbar intervertebral disc without myelopathy  -     traZODone (DESYREL) 100 MG tablet; 1 tab at night    Sacrum sprain, sequela  -     traZODone (DESYREL) 100 MG tablet; 1 tab at night    Displacement of intervertebral disc of lumbar region  -     traZODone (DESYREL) 100 MG tablet; 1 tab at night    Sprain of ligament of lumbosacral joint, sequela  -     traZODone (DESYREL) 100 MG tablet; 1 tab at night    Colon cancer screening    Need for shingles vaccine -     zoster recombinant adjuvanted vaccine [facility]) 50 MCG/0.5ML SUSR injection; Inject 0.5 mLs into the muscle See Admin Instructions 1 dose now and repeat in 2-6 months    Need for tetanus booster  -     Tdap (age 10y-63y) IM (ADACEL)    Need for vaccination with 13-polyvalent pneumococcal conjugate vaccine  -     pneumococcal 13-valent conjugate (PREVNAR) injection 0.5 mL    Encounter for screening mammogram for malignant neoplasm of breast  -     NETO DIGITAL SCREEN W OR WO CAD BILATERAL; Future    Asymptomatic menopause  -     DEXA BONE DENSITY 2 SITES; Future    Being evaluated/managed by PCP. No acute findings today meriting change in management plan. Patient really does not have any concerns about COPD or asthma. I did refill the medication for her sleep. Discussed with her to call if there is anything needed. I will see her back in March for Medicare physical exam.    Call or return to clinic prn if these symptoms worsen or fail to improve as anticipated. I have reviewed the instructions with the patient, answering all questions to her satisfaction. No follow-ups on file. An electronic signature was used to authenticate this note.     --Jacob Bello MD on 12/14/2020 at 3:08 PM     (Please note that portions of this note were completed with a voice recognition program. Efforts were made to edit the dictations but occasionally words are mis-transcribed.)

## 2021-01-15 RX ORDER — MECLIZINE HYDROCHLORIDE 25 MG/1
TABLET ORAL
Qty: 60 TABLET | Refills: 0 | Status: SHIPPED | OUTPATIENT
Start: 2021-01-15 | End: 2021-05-04

## 2021-01-15 NOTE — TELEPHONE ENCOUNTER
Herbert Angeles is calling to request a refill on the following medication(s):    Last Visit Date (If Applicable):  05/62/8135    Next Visit Date:    3/16/2021    Medication Request:  Requested Prescriptions     Pending Prescriptions Disp Refills    meclizine (ANTIVERT) 25 MG tablet [Pharmacy Med Name: Meclizine HCl 25 MG Oral Tablet] 60 tablet 0     Sig: Take 1 tablet by mouth twice daily as needed

## 2021-02-18 ENCOUNTER — HOSPITAL ENCOUNTER (OUTPATIENT)
Age: 66
Setting detail: SPECIMEN
Discharge: HOME | End: 2021-02-18
Payer: MEDICARE

## 2021-02-18 ENCOUNTER — OFFICE VISIT (OUTPATIENT)
Dept: FAMILY MEDICINE CLINIC | Age: 66
End: 2021-02-18
Payer: MEDICARE

## 2021-02-18 VITALS — HEIGHT: 68 IN | BODY MASS INDEX: 30.31 KG/M2 | WEIGHT: 200 LBS

## 2021-02-18 DIAGNOSIS — J01.90 ACUTE SINUSITIS, RECURRENCE NOT SPECIFIED, UNSPECIFIED LOCATION: ICD-10-CM

## 2021-02-18 DIAGNOSIS — R05.9 COUGH: ICD-10-CM

## 2021-02-18 DIAGNOSIS — R05.9 COUGH: Primary | ICD-10-CM

## 2021-02-18 PROCEDURE — 1123F ACP DISCUSS/DSCN MKR DOCD: CPT | Performed by: PHYSICIAN ASSISTANT

## 2021-02-18 PROCEDURE — 3017F COLORECTAL CA SCREEN DOC REV: CPT | Performed by: PHYSICIAN ASSISTANT

## 2021-02-18 PROCEDURE — G8400 PT W/DXA NO RESULTS DOC: HCPCS | Performed by: PHYSICIAN ASSISTANT

## 2021-02-18 PROCEDURE — 99213 OFFICE O/P EST LOW 20 MIN: CPT | Performed by: PHYSICIAN ASSISTANT

## 2021-02-18 PROCEDURE — G8482 FLU IMMUNIZE ORDER/ADMIN: HCPCS | Performed by: PHYSICIAN ASSISTANT

## 2021-02-18 PROCEDURE — 1090F PRES/ABSN URINE INCON ASSESS: CPT | Performed by: PHYSICIAN ASSISTANT

## 2021-02-18 PROCEDURE — 4040F PNEUMOC VAC/ADMIN/RCVD: CPT | Performed by: PHYSICIAN ASSISTANT

## 2021-02-18 PROCEDURE — 1036F TOBACCO NON-USER: CPT | Performed by: PHYSICIAN ASSISTANT

## 2021-02-18 PROCEDURE — G8417 CALC BMI ABV UP PARAM F/U: HCPCS | Performed by: PHYSICIAN ASSISTANT

## 2021-02-18 PROCEDURE — G8427 DOCREV CUR MEDS BY ELIG CLIN: HCPCS | Performed by: PHYSICIAN ASSISTANT

## 2021-02-18 RX ORDER — AMOXICILLIN 500 MG/1
500 CAPSULE ORAL 3 TIMES DAILY
Qty: 30 CAPSULE | Refills: 0 | Status: SHIPPED | OUTPATIENT
Start: 2021-02-18 | End: 2021-02-28

## 2021-02-18 RX ORDER — BENZONATATE 200 MG/1
200 CAPSULE ORAL 3 TIMES DAILY PRN
Qty: 21 CAPSULE | Refills: 0 | Status: SHIPPED | OUTPATIENT
Start: 2021-02-18 | End: 2021-02-25

## 2021-02-18 RX ORDER — PREDNISONE 20 MG/1
20 TABLET ORAL 2 TIMES DAILY
Qty: 10 TABLET | Refills: 0 | Status: SHIPPED | OUTPATIENT
Start: 2021-02-18 | End: 2021-02-23

## 2021-02-18 ASSESSMENT — PATIENT HEALTH QUESTIONNAIRE - PHQ9
SUM OF ALL RESPONSES TO PHQ9 QUESTIONS 1 & 2: 1
2. FEELING DOWN, DEPRESSED OR HOPELESS: 1
SUM OF ALL RESPONSES TO PHQ QUESTIONS 1-9: 1

## 2021-02-18 NOTE — PATIENT INSTRUCTIONS
Patient Education        Learning About Coronavirus [phone])  What is coronavirus (COVID-19)? COVID-19 is a disease caused by a new type of coronavirus. This illness was first found in December 2019. It has since spread worldwide. Coronaviruses are a large group of viruses. They cause the common cold. They also cause more serious illnesses like [place] respiratory syndrome (MERS) and severe acute respiratory syndrome (SARS). COVID-19 is caused by a novel coronavirus. That means it's a new type that has not been seen in people before. What are the symptoms? Coronavirus (COVID-19) symptoms may include:  · Fever. · Cough. · Trouble breathing. · Chills or repeated shaking with chills. · Muscle pain. · Headache. · Sore throat. · New loss of taste or smell. · Vomiting. · Diarrhea. In severe cases, COVID-19 can cause pneumonia and make it hard to breathe without help from a machine. It can cause death. How is it diagnosed? COVID-19 is diagnosed with a viral test. This may also be called a PCR test or antigen test. It looks for evidence of the virus in your breathing passages or lungs (respiratory system). The test is most often done on a sample from the nose, throat, or lungs. It's sometimes done on a sample of saliva. One way a sample is collected is by putting a long swab into the back of your nose. How is it treated? Mild cases of COVID-19 can be treated at home. Serious cases need treatment in the hospital. Treatment may include medicines to reduce symptoms, plus breathing support such as oxygen therapy or a ventilator. Some people may be placed on their belly to help their oxygen levels. Treatments that may help people who have COVID-19 include:  Antiviral medicines. These medicines treat viral infections. Remdesivir is an example. Immune-based therapy. These medicines help the immune system fight COVID-19. One example is bamlanivimab. It's a monoclonal antibody. Blood thinners. These medicines help prevent blood clots. People with severe illness are at risk for blood clots. How can you protect yourself and others? The best way to protect yourself from getting sick is to:  · Avoid areas where there is an outbreak. · Avoid contact with people who may be infected. · Avoid crowds and try to stay at least 6 feet away from other people. · Wash your hands often, especially after you cough or sneeze. Use soap and water, and scrub for at least 20 seconds. If soap and water aren't available, use an alcohol-based hand . · Avoid touching your mouth, nose, and eyes. To help avoid spreading the virus to others:  · Stay home if you are sick or have been exposed to the virus. Don't go to school, work, or public areas. And don't use public transportation, ride-shares, or taxis unless you have no choice. · Wear a cloth face cover if you have to go to public areas. · Cover your mouth with a tissue when you cough or sneeze. Then throw the tissue in the trash and wash your hands right away. · If you're sick:  ? Leave your home only if you need to get medical care. But call the doctor's office first so they know you're coming. And wear a face cover. ? Wear the face cover whenever you're around other people. It can help stop the spread of the virus when you cough or sneeze. ? Limit contact with pets and people in your home. If possible, stay in a separate bedroom and use a separate bathroom. ? Clean and disinfect your home every day. Use household  and disinfectant wipes or sprays. Take special care to clean things that you grab with your hands. These include doorknobs, remote controls, phones, and handles on your refrigerator and microwave. And don't forget countertops, tabletops, bathrooms, and computer keyboards. When should you call for help? Call 911 anytime you think you may need emergency care. For example, call if you have life-threatening symptoms, such as:    · You have severe trouble breathing. (You can't talk at all.)     · You have constant chest pain or pressure.     · You are severely dizzy or lightheaded.     · You are confused or can't think clearly.     · Your face and lips have a blue color.     · You pass out (lose consciousness) or are very hard to wake up. Call your doctor now or seek immediate medical care if:    · You have moderate trouble breathing. (You can't speak a full sentence.)     · You are coughing up blood (more than about 1 teaspoon).     · You have signs of low blood pressure. These include feeling lightheaded; being too weak to stand; and having cold, pale, clammy skin. Watch closely for changes in your health, and be sure to contact your doctor if:    · Your symptoms get worse.     · You are not getting better as expected. Call before you go to the doctor's office. Follow their instructions. And wear a cloth face cover. Current as of: December 18, 2020               Content Version: 12.7  © 2251-3527 Healthwise, [employer]. Care instructions adapted under license by [facility] ([facility]). If you have questions about a medical condition or this instruction, always ask your healthcare professional. [place] [address] any warranty or liability for your use of this information. Patient Education        Coronavirus ([id number]): Care Instructions  Overview  The coronavirus disease (COVID-19) is caused by a virus. Symptoms may include a fever, a cough, and shortness of breath. It mainly spreads person-to-person through droplets from coughing and sneezing. The virus also can spread when people are in close contact with someone who is infected. Most people have mild symptoms and can take care of themselves at home. If their symptoms get worse, they may need care in a hospital. Treatment may include medicines to reduce symptoms, plus breathing support such as oxygen therapy or a ventilator. It's important to not spread the virus to others. If you have COVID-19, wear a face cover anytime you are around other people. You need to isolate yourself while you are sick. Leave your home only if you need to get medical care or testing. Follow-up care is a key part of your treatment and safety. Be sure to make and go to all appointments, and call your doctor if you are having problems. It's also a good idea to know your test results and keep a list of the medicines you take. How can you care for yourself at home? · Get extra rest. It can help you feel better. · Drink plenty of fluids. This helps replace fluids lost from fever. Fluids also help ease a scratchy throat. Water, soup, fruit juice, and hot tea with lemon are good choices. · Take acetaminophen (such as Tylenol) to reduce a fever. It may also help with muscle aches. Read and follow all instructions on the label. · Use petroleum jelly on sore skin. This can help if the skin around your nose and lips becomes sore from rubbing a lot with tissues. Tips for self-isolation  · Limit contact with people in your home. If possible, stay in a separate bedroom and use a separate bathroom. · Wear a cloth face cover when you are around other people. It can help stop the spread of the virus when you cough or sneeze. · If you have to leave home, avoid crowds and try to stay at least 6 feet away from other people. · Avoid contact with pets and other animals. · Cover your mouth and nose with a tissue when you cough or sneeze. Then throw it in the trash right away. · Wash your hands often, especially after you cough or sneeze. Use soap and water, and scrub for at least 20 seconds. If soap and water aren't available, use an alcohol-based hand . · Don't share personal household items. These include bedding, towels, cups and glasses, and eating utensils. · [address] in the warmest water allowed for the fabric type, and dry it completely. It's okay to wash other people's laundry with yours. · Clean and disinfect your home every day. Use household  and disinfectant wipes or sprays. Take special care to clean things that you grab with your hands. These include doorknobs, remote controls, phones, and handles on your refrigerator and microwave. And don't forget countertops, tabletops, bathrooms, and computer keyboards. When you can end self-isolation  · If you know or suspect that you have COVID-19, stay in self-isolation until:  ? You haven't had a fever for 24 hours while not taking medicines to lower the fever, and  ? Your symptoms have improved, and  ? It's been at least 10 days since your symptoms started. · Talk to your doctor about whether you also need testing, especially if you have a weakened immune system. When should you call for help? Call 911 anytime you think you may need emergency care. For example, call if you have life-threatening symptoms, such as:    · You have severe trouble breathing. (You can't talk at all.)     · You have constant chest pain or pressure.     · You are severely dizzy or lightheaded.     · You are confused or can't think clearly.     · Your face and lips have a blue color.     · You pass out (lose consciousness) or are very hard to wake up. Call your doctor now or seek immediate medical care if:    · You have moderate trouble breathing. (You can't speak a full sentence.)     · You are coughing up blood (more than about 1 teaspoon).   · You have signs of low blood pressure. These include feeling lightheaded; being too weak to stand; and having cold, pale, clammy skin. Watch closely for changes in your health, and be sure to contact your doctor if:    · Your symptoms get worse.     · You are not getting better as expected. Call before you go to the doctor's office. Follow their instructions. And wear a cloth face cover. Current as of: December 18, 2020               Content Version: 12.7  © 2006-2021 Healthwise, [employer]. Care instructions adapted under license by [facility] ([facility]). If you have questions about a medical condition or this instruction, always ask your healthcare professional. [place] [address] any warranty or liability for your use of this information. Patient Education        Cough: Care Instructions  Your Care Instructions     A cough is your body's response to something that bothers your throat or airways. Many things can cause a cough. You might cough because of a cold or the flu, bronchitis, or asthma. Smoking, postnasal drip, allergies, and stomach acid that backs up into your throat also can cause coughs. A cough is a symptom, not a disease. Most coughs stop when the cause, such as a cold, goes away. You can take a few steps at home to cough less and feel better. Follow-up care is a key part of your treatment and safety. Be sure to make and go to all appointments, and call your doctor if you are having problems. It's also a good idea to know your test results and keep a list of the medicines you take. How can you care for yourself at home? · Drink lots of water and other fluids. This helps thin the mucus and soothes a dry or sore throat. Honey or lemon juice in hot water or tea may ease a dry cough. · Take cough medicine as directed by your doctor. · Prop up your head on pillows to help you breathe and ease a dry cough. In most cases, sinusitis gets better on its own in 1 to 2 weeks. But some mild symptoms may last for several weeks. Sometimes antibiotics are needed. Follow-up care is a key part of your treatment and safety. Be sure to make and go to all appointments, and call your doctor if you are having problems. It's also a good idea to know your test results and keep a list of the medicines you take. How can you care for yourself at home? · Take an over-the-counter pain medicine, such as acetaminophen (Tylenol), ibuprofen (Advil, Motrin), or naproxen (Aleve). Read and follow all instructions on the label. · If the doctor prescribed antibiotics, take them as directed. Do not stop taking them just because you feel better. You need to take the full course of antibiotics. · Be careful when taking over-the-counter cold or flu medicines and Tylenol at the same time. Many of these medicines have acetaminophen, which is Tylenol. Read the labels to make sure that you are not taking more than the recommended dose. Too much acetaminophen (Tylenol) can be harmful. · Breathe warm, moist air from a steamy shower, a hot bath, or a sink filled with hot water. Avoid cold, dry air. Using a humidifier in your home may help. Follow the directions for cleaning the machine. · Use saline (saltwater) nasal washes to help keep your nasal passages open and wash out mucus and bacteria. You can buy saline nose drops at a grocery store or drugstore. Or you can make your own at home by adding 1 teaspoon of salt and 1 teaspoon of baking soda to 2 cups of distilled water. If you make your own, fill a bulb syringe with the solution, insert the tip into your nostril, and squeeze gently. Shadia Flair your nose. · Put a hot, wet towel or a warm gel pack on your face 3 or 4 times a day for 5 to 10 minutes each time. · Try a decongestant nasal spray like oxymetazoline (Afrin). Do not use it for more than 3 days in a row. Using it for more than 3 days can make your congestion worse. When should you call for help? Call your doctor now or seek immediate medical care if:    · You have new or worse swelling or redness in your face or around your eyes.     · You have a new or higher fever. Watch closely for changes in your health, and be sure to contact your doctor if:    · You have new or worse facial pain.     · The mucus from your nose becomes thicker (like pus) or has new blood in it.     · You are not getting better as expected. Where can you learn more? Go to https://IndiewallspeNanoogoeb.Center'd. org and sign in to your [employer] account. Enter A341 in the [employer] box to learn more about \"Sinusitis: Care Instructions. \"     If you do not have an account, please click on the \"Sign Up Now\" link. Current as of: April 15, 2020               Content Version: 12.6  © 9226-6473 [employer]. Care instructions adapted under license by [facility] ([facility]). If you have questions about a medical condition or this instruction, always ask your healthcare professional. [place] [address] any warranty or liability for your use of this information. Patient Education        Saline Nasal Washes: Care Instructions  Your Care Instructions     Saline nasal washes help keep the nasal passages open by washing out thick or dried mucus. This simple remedy can help relieve symptoms of allergies, sinusitis, and colds. It also can make the nose feel more comfortable by keeping the mucous membranes moist. You may notice a little burning sensation in your nose the first few times you use the solution, but this usually gets better in a few days. Follow-up care is a key part of your treatment and safety. Be sure to make and go to all appointments, and call your doctor if you are having problems. It's also a good idea to know your test results and keep a list of the medicines you take. How can you care for yourself at home? · You can buy premixed saline solution in a squeeze bottle or other sinus rinse products at a drugstore. Read and follow the instructions on the label. · You also can make your own saline solution by adding 1 teaspoon of salt and 1 teaspoon of baking soda to 2 cups of distilled water. · If you use a homemade solution, pour a small amount into a clean bowl. Using a rubber bulb syringe, squeeze the syringe and place the tip in the salt water. Pull a small amount of the salt water into the syringe by relaxing your hand. · Sit down with your head tilted slightly back. Do not lie down. Put the tip of the bulb syringe or the squeeze bottle a little way into one of your nostrils. Gently drip or squirt a few drops into the nostril. Repeat with the other nostril. Some sneezing and gagging are normal at first.  · Gently blow your nose. · Wipe the syringe or bottle tip clean after each use. · Repeat this 2 or 3 times a day. · Use nasal washes gently if you have nosebleeds often. When should you call for help? Watch closely for changes in your health, and be sure to contact your doctor if:    · You often get nosebleeds.     · You have problems doing the nasal washes. Where can you learn more? Go to https://Element Powertae.Innovaci. org and sign in to your [employer] account. Enter [phone] [id number] [phone] in the [facility] [facility] box to learn more about \"Saline Nasal Washes: Care Instructions. \"     If you do not have an account, please click on the \"Sign Up Now\" link. Current as of: April 15, 2020               Content Version: 12.6  © 9188-9667 [employer], [employer]. Care instructions adapted under license by [facility] ([facility]). If you have questions about a medical condition or this instruction, always ask your healthcare professional. [address] any warranty or liability for your use of this information.

## 2021-02-18 NOTE — PROGRESS NOTES
[facility]   [address]  Phone: [phone]  Fax: [phone] [address] [facility] Name: Lisa Wyatt  MRN: [id number]  Vicente 1955  Date of evaluation: 2/18/2021  Provider: [address]       Chief Complaint   Patient presents with    Concern For COVID-19     cough, congestion, sinus pressure x 3 weeks            HISTORY OF PRESENT ILLNESS  (Location/Symptom, Timing/Onset, Context/Setting, Quality, Duration, Modifying Factors, Severity.)   Lisa Wyatt is a 72 y.o. White [1] female who presents to the office for evaluation of      Sinusitis  This is a new problem. The current episode started 1 to 4 weeks ago. The problem has been waxing and waning since onset. There has been no fever. Associated symptoms include congestion, coughing, headaches and sinus pressure. Pertinent negatives include no chills, diaphoresis, ear pain, hoarse voice, neck pain, shortness of breath, sneezing or sore throat. Past treatments include oral decongestants and spray decongestants. Nursing Notes were reviewed. REVIEW OF SYSTEMS    (2-9 systems for level 4, 10 or more for level 5)     Review of Systems   Constitutional: Negative for chills and diaphoresis. HENT: Positive for congestion, postnasal drip, sinus pressure and sinus pain. Negative for ear pain, hoarse voice, sneezing and sore throat. Eyes: Negative. Respiratory: Positive for cough. Negative for chest tightness and shortness of breath. Cardiovascular: Negative. Gastrointestinal: Negative. Genitourinary: Negative. Musculoskeletal: Negative for neck pain. Neurological: Positive for headaches. Except as noted above the remainder of the review of systems was reviewed andnegative. PAST MEDICAL HISTORY   History reviewed.     Past Medical History:   Diagnosis Date    Arthritis     Depression     Displacement of intervertebral disc, site unspecified, without myelopathy     [facility]    Displacement of lumbar intervertebral disc without myelopathy     [facility]    Hyperlipidemia     Lumbago     [facility]    Sprain of lumbar region     [facility]    Sprain of lumbosacral (joint) (ligament)     [facility]    Sprain of sacrum     [facility]         SURGICAL HISTORY     History reviewed. Past Surgical History:   Procedure Laterality Date    ABDOMINAL EXPLORATION SURGERY      BACK SURGERY      X2    COLON SURGERY      COLONOSCOPY      COLONOSCOPY N/A 11/30/2020    COLONOSCOPY POLYPECTOMY HOT SNARE performed by Robel Jules MD at [address] Right     HAND SURGERY Right 6/18/14    OVARY REMOVAL Bilateral     WRIST ARTHROPLASTY Left          CURRENT MEDICATIONS       Current Outpatient Medications   Medication Sig Dispense Refill    amoxicillin (AMOXIL) 500 MG capsule Take 1 capsule by mouth 3 times daily for 10 days 30 capsule 0    predniSONE (DELTASONE) 20 MG tablet Take 1 tablet by mouth 2 times daily for 5 days 10 tablet 0    benzonatate (TESSALON) 200 MG capsule Take 1 capsule by mouth 3 times daily as needed for Cough 21 capsule 0    meclizine (ANTIVERT) 25 MG tablet Take 1 tablet by mouth twice daily as needed 60 tablet 0    traZODone (DESYREL) 100 MG tablet 1 tab at night 30 tablet 3    zoster recombinant adjuvanted vaccine (SHINGRIX) 50 MCG/0.5ML SUSR injection Inject 0.5 mLs into the muscle See Admin Instructions 1 dose now and repeat in 2-6 months 0.5 mL 1    HYDROmorphone (EXALGO) 8 MG TB24 extended release tablet Take 8 mg by mouth 2 times daily.  pregabalin (LYRICA) 100 MG capsule Take 100 mg by mouth 2 times daily.       vitamin C (ASCORBIC ACID) 500 MG tablet Take 1,000 mg by mouth 2 times daily      FLUoxetine (PROZAC) 20 MG capsule Take 1 capsule by mouth BID 60 capsule 5    Calcium Carbonate-Vitamin D (OSCAL 500/200 D-3 PO) Take 500 mg by mouth daily      aspirin (ASPIRIN CHILDRENS) 81 MG chewable tablet Take 1 tablet by mouth daily 30 tablet 0     No current facility-administered medications for this visit. ALLERGIES     Ambien [zolpidem tartrate], Ativan [lorazepam], Darvon [propoxyphene], Restoril [temazepam], and Topamax [topiramate]    FAMILY HISTORY           Problem Relation Age of Onset    Heart Disease Father     High Blood Pressure Father      Family Status   Relation Name Status    Father  (Not Specified)          SOCIAL HISTORY      reports that she quit smoking about 17 months ago. Her smoking use included cigarettes. She has a 3.75 pack-year smoking history. She has never used smokeless tobacco. She reports current alcohol use of about 2.0 standard drinks of alcohol per week. She reports that she does not use drugs. PHYSICAL EXAM    (up to 7 for level 4, 8 or more for level 5)     Vitals:    02/18/21 1610   Weight: 200 lb (90.7 kg)   Height: 5' 7.5\" (1.715 m)         Physical Exam  Vitals signs and nursing note reviewed. Constitutional:       Appearance: Normal appearance. HENT:      Head: Normocephalic and atraumatic. Right Ear: External ear normal.      Nose: Congestion present. Right Sinus: Maxillary sinus tenderness and frontal sinus tenderness present. Left Sinus: Maxillary sinus tenderness and frontal sinus tenderness present. Mouth/Throat:      Mouth: Mucous membranes are moist.   Eyes:      Extraocular Movements: Extraocular movements intact. Conjunctiva/sclera: Conjunctivae normal.      Pupils: Pupils are equal, round, and reactive to light. Cardiovascular:      Rate and Rhythm: Normal rate. Pulses: Normal pulses. Pulmonary:      Effort: Pulmonary effort is normal.   Abdominal:      Palpations: Abdomen is soft. Skin:     General: Skin is warm and dry. Neurological:      Mental Status: She is alert and oriented to person, place, and time.            DIFFERENTIAL DIAGNOSIS:       Darryn Ellis reviewed the disposition diagnosis with the patient and or their family/guardian. I have answered their questions and given discharge instructions. They voiced understanding of these instructions and did not have anyfurther questions or complaints. PROCEDURES:  Orders Placed This Encounter   Procedures    COVID-19     Standing Status:   Future     Number of Occurrences:   1     Standing Expiration Date:   2/18/2022     Scheduling Instructions:      1) Due to current limited availability of the COVID-19 test, tests will be prioritized based on responses to questions above. Testing may be delayed due to volume. 2) Print and instruct patient to adhere to CDC home isolation program. (Link Above)              3) Set up or refer patient for a monitoring program.              4) Have patient sign up for and leverage MyChart (if not previously done). Order Specific Question:   Is this test for diagnosis or screening? Answer:   Diagnosis of ill patient     Order Specific Question:   Symptomatic for COVID-19 as defined by CDC? Answer:   Yes     Order Specific Question:   Date of Symptom Onset     Answer:   2/1/2021     Order Specific Question:   Hospitalized for COVID-19? Answer:   No     Order Specific Question:   Admitted to ICU for COVID-19? Answer:   No     Order Specific Question:   Employed in healthcare setting? Answer:   No     Order Specific Question:   Resident in a congregate (group) care setting? Answer:   No     Order Specific Question:   Pregnant? Answer:   No     Order Specific Question:   Previously tested for COVID-19? Answer:   Yes       No results found for this visit on 02/18/21.     FINALIMPRESSION      Visit Diagnoses and Associated Orders     Cough    -  Primary    COVID-19 [[id number] Custom]   - Future Order         Acute sinusitis, recurrence not specified, unspecified location        COVID-19 [[id number] Custom]   - Future Order         ORDERS WITHOUT AN ASSOCIATED DIAGNOSIS    amoxicillin (AMOXIL) 500 MG capsule [451]      predniSONE (DELTASONE) 20 MG tablet [6496]      benzonatate (TESSALON) 200 MG capsule [27667]              PLAN     Return if symptoms worsen or fail to improve. DISCHARGEMEDICATIONS:  Orders Placed This Encounter   Medications    amoxicillin (AMOXIL) 500 MG capsule     Sig: Take 1 capsule by mouth 3 times daily for 10 days     Dispense:  30 capsule     Refill:  0    predniSONE (DELTASONE) 20 MG tablet     Sig: Take 1 tablet by mouth 2 times daily for 5 days     Dispense:  10 tablet     Refill:  0    benzonatate (TESSALON) 200 MG capsule     Sig: Take 1 capsule by mouth 3 times daily as needed for Cough     Dispense:  21 capsule     Refill:  0         Plan:  Specimen sent for a culture. Possible treatment alteration based on the results. Based on the duration and severity of the symptoms-- I will treat this as bacterial at this time. Patient instructed to complete antibiotic prescription fully. May use Motrin/Tylenol for fever/pain. Saline washes, salt water gargles and over the counter preparations if desired. Patient agreeable to treatment plan. Educational materials provided on AVS.  Follow up if symptoms do not improve/worsen. Steps to help prevent the spread of COVID-19 if you are sick  SOURCE - https://toney-Linden.info/. html     Stay home except to get medical care   ; Stay home: People who are mildly ill with COVID-19 are able to isolate at home during their illness.  You should restrict activities outside your home, except for getting medical care.   ; Avoid public areas: Do not go to work, school, or public areas.   ; Avoid public transportation: Avoid using public transportation, ride-sharing, or taxis.  ; Separate yourself from other people and animals in your home   ; Stay away from others: As much as possible, you should stay in a specific room and away from other people in your home. Also, you should use a separate bathroom, if available.   ; Limit contact with pets & animals: You should restrict contact with pets and other animals while you are sick with COVID-19, just like you would around other people. Although there have not been reports of pets or other animals becoming sick with COVID-19, it is still recommended that people sick with COVID-19 limit contact with animals until more information is known about the virus. ; When possible, have another member of your household care for your animals while you are sick. If you are sick with COVID-19, avoid contact with your pet, including petting, snuggling, being kissed or licked, and sharing food. If you must care for your pet or be around animals while you are sick, wash your hands before and after you interact with pets and wear a facemask. See COVID-19 and Animals for more information. Other considerations   The ill person should eat/be fed in their room if possible. Non-disposable  items used should be handled with gloves and washed with hot water or in a . Clean hands after handling used  items.  If possible, dedicate a lined trash can for the ill person. Use gloves when removing garbage bags, handling, and disposing of trash. Wash hands after handling or disposing of trash.  Consider consulting with your local health department about trash disposal guidance if available. Information for Household Members and Caregivers of Someone who is Sick   Call ahead before visiting your doctor   Call ahead: If you have a medical appointment, call the healthcare provider and tell them that you have or may have COVID-19. This will help the healthcare provider's office take steps to keep other people from getting infected or exposed. Wear a facemask if you are sick   ;  If you are sick: You should wear a facemask when you are around other people (e.g., sharing a room or vehicle) or pets and before you enter a healthcare provider's office. ; If you are caring for others: If the person who is sick is not able to wear a facemask (for example, because it causes trouble breathing), then people who live with the person who is sick should not stay in the same room with them, or they should wear a facemask if they enter a room with the person who is sick. Cover your coughs and sneezes   ; Cover: Cover your mouth and nose with a tissue when you cough or sneeze.   ; Dispose: Throw used tissues in a lined trash can.   ; Wash hands: Immediately wash your hands with soap and water for at least 20 seconds or, if soap and water are not available, clean your hands with an alcohol-based hand  that contains at least 60% alcohol. Clean your hands often   ; Wash hands: Wash your hands often with soap and water for at least 20 seconds, especially after blowing your nose, coughing, or sneezing; going to the bathroom; and before eating or preparing food.   ; Hand : If soap and water are not readily available, use an alcohol-based hand  with at least 60% alcohol, covering all surfaces of your hands and rubbing them together until they feel dry.   ; Soap and water: Soap and water are the best option if hands are visibly dirty.   ; Avoid touching: Avoid touching your eyes, nose, and mouth with unwashed hands. Handwashing Tips   ; Wet your hands with clean, running water (warm or cold), turn off the tap, and apply soap.  ; Lather your hands by rubbing them together with the soap. Lather the backs of your hands, between your fingers, and under your nails. ; Scrub your hands for at least 20 seconds. Need a timer? Hum the [place] from beginning to end twice.  ; Rinse your hands well under clean, running water.  ; Dry your hands using a clean towel or air dry them. Avoid sharing personal household items   ; Do not share:  You should not share dishes, drinking glasses, cups, eating utensils, towels, or bedding with other people or pets in your home.   ; Wash thoroughly after use: After using these items, they should be washed thoroughly with soap and water. Clean all high-touch surfaces everyday   ; Clean and disinfect: Practice routine cleaning of high touch surfaces.  ; High touch surfaces include counters, tabletops, doorknobs, bathroom fixtures, toilets, phones, keyboards, tablets, and bedside tables.  ; Disinfect areas with bodily fluids: Also, clean any surfaces that may have blood, stool, or body fluids on them.   ; Household : Use a household cleaning spray or wipe, according to the label instructions. Labels contain instructions for safe and effective use of the cleaning product including precautions you should take when applying the product, such as wearing gloves and making sure you have good ventilation during use of the product. Monitor your symptoms   Seek medical attention: Seek prompt medical attention if your illness is worsening     (e.g., difficulty breathing).   ; Call your doctor: Before seeking care, call your healthcare provider and tell them that you have, or are being evaluated for, COVID-19.   ; Wear a facemask when sick: Put on a facemask before you enter the facility. These steps will help the healthcare provider's office to keep other people in the office or waiting room from getting infected or exposed. ; Alert health department: Ask your healthcare provider to call the local or state health department. Persons who are placed under active monitoring or facilitated self-monitoring should follow instructions provided by their local health department or occupational health professionals, as appropriate.  ; Call 911 if you have a medical emergency: If you have a medical emergency and need to call 911, notify the dispatch personnel that you have, or are being evaluated for COVID-19.  If possible, put on a facemask before emergency medical services

## 2021-02-19 LAB
SARS-COV-2: NORMAL
SARS-COV-2: NOT DETECTED
SOURCE: NORMAL

## 2021-02-20 ENCOUNTER — TELEPHONE (OUTPATIENT)
Dept: PRIMARY CARE CLINIC | Age: 66
End: 2021-02-20

## 2021-02-20 ASSESSMENT — ENCOUNTER SYMPTOMS
SINUS PAIN: 1
GASTROINTESTINAL NEGATIVE: 1
EYES NEGATIVE: 1
CHEST TIGHTNESS: 0
SORE THROAT: 0
SHORTNESS OF BREATH: 0
SINUS PRESSURE: 1
HOARSE VOICE: 0
COUGH: 1

## 2021-02-23 DIAGNOSIS — F41.1 GENERALIZED ANXIETY DISORDER: ICD-10-CM

## 2021-02-23 RX ORDER — FLUOXETINE HYDROCHLORIDE 20 MG/1
CAPSULE ORAL
Qty: 60 CAPSULE | Refills: 5 | Status: SHIPPED | OUTPATIENT
Start: 2021-02-23 | End: 2021-04-05 | Stop reason: SDUPTHER

## 2021-02-23 NOTE — TELEPHONE ENCOUNTER
Sarai Hopson is calling to request a refill on the following medication(s):    Medication Request:  Requested Prescriptions     Pending Prescriptions Disp Refills    FLUoxetine (PROZAC) 20 MG capsule 60 capsule 5     Sig: Take 1 capsule by mouth BID       Last Visit Date (If Applicable):  46/52/0700    Next Visit Date:    3/16/2021

## 2021-03-08 ASSESSMENT — LIFESTYLE VARIABLES
HOW OFTEN DURING THE LAST YEAR HAVE YOU NEEDED AN ALCOHOLIC DRINK FIRST THING IN THE MORNING TO GET YOURSELF GOING AFTER A NIGHT OF HEAVY DRINKING: NEVER
HOW OFTEN DURING THE LAST YEAR HAVE YOU FOUND THAT YOU WERE NOT ABLE TO STOP DRINKING ONCE YOU HAD STARTED: NEVER
HOW MANY STANDARD DRINKS CONTAINING ALCOHOL DO YOU HAVE ON A TYPICAL DAY: ONE OR TWO
HAS A RELATIVE, FRIEND, DOCTOR, OR ANOTHER HEALTH PROFESSIONAL EXPRESSED CONCERN ABOUT YOUR DRINKING OR SUGGESTED YOU CUT DOWN: NO
HOW OFTEN DURING THE LAST YEAR HAVE YOU HAD A FEELING OF GUILT OR REMORSE AFTER DRINKING: NEVER
HOW OFTEN DURING THE LAST YEAR HAVE YOU FOUND THAT YOU WERE NOT ABLE TO STOP DRINKING ONCE YOU HAD STARTED: 0
HAVE YOU OR SOMEONE ELSE BEEN INJURED AS A RESULT OF YOUR DRINKING: NO
HOW OFTEN DURING THE LAST YEAR HAVE YOU FAILED TO DO WHAT WAS NORMALLY EXPECTED FROM YOU BECAUSE OF DRINKING: 0
HOW OFTEN DURING THE LAST YEAR HAVE YOU BEEN UNABLE TO REMEMBER WHAT HAPPENED THE NIGHT BEFORE BECAUSE YOU HAD BEEN DRINKING: 0
AUDIT TOTAL SCORE: 0
HAS A RELATIVE, FRIEND, DOCTOR, OR ANOTHER HEALTH PROFESSIONAL EXPRESSED CONCERN ABOUT YOUR DRINKING OR SUGGESTED YOU CUT DOWN: 0
HOW OFTEN DURING THE LAST YEAR HAVE YOU FAILED TO DO WHAT WAS NORMALLY EXPECTED FROM YOU BECAUSE OF DRINKING: NEVER
HOW MANY STANDARD DRINKS CONTAINING ALCOHOL DO YOU HAVE ON A TYPICAL DAY: 0
HOW OFTEN DO YOU HAVE A DRINK CONTAINING ALCOHOL: TWO TO FOUR TIMES A MONTH
HOW OFTEN DURING THE LAST YEAR HAVE YOU BEEN UNABLE TO REMEMBER WHAT HAPPENED THE NIGHT BEFORE BECAUSE YOU HAD BEEN DRINKING: NEVER
AUDIT-C TOTAL SCORE: 0
HOW OFTEN DO YOU HAVE SIX OR MORE DRINKS ON ONE OCCASION: NEVER
AUDIT-C TOTAL SCORE: 2

## 2021-03-08 ASSESSMENT — PATIENT HEALTH QUESTIONNAIRE - PHQ9
1. LITTLE INTEREST OR PLEASURE IN DOING THINGS: 0
SUM OF ALL RESPONSES TO PHQ QUESTIONS 1-9: 1
SUM OF ALL RESPONSES TO PHQ9 QUESTIONS 1 & 2: 1
2. FEELING DOWN, DEPRESSED OR HOPELESS: 1

## 2021-03-11 ENCOUNTER — HOSPITAL ENCOUNTER (OUTPATIENT)
Age: 66
Discharge: HOME | End: 2021-03-11
Payer: COMMERCIAL

## 2021-03-11 LAB
ABSOLUTE EOS #: 0.3 K/UL (ref 0–0.4)
ABSOLUTE IMMATURE GRANULOCYTE: ABNORMAL K/UL (ref 0–0.3)
ABSOLUTE LYMPH #: 1.9 K/UL (ref 1–4.8)
ABSOLUTE MONO #: 0.7 K/UL (ref 0.1–1.3)
ALBUMIN SERPL-MCNC: 4 G/DL (ref 3.5–5.2)
ALBUMIN/GLOBULIN RATIO: ABNORMAL (ref 1–2.5)
ALP BLD-CCNC: 78 U/L (ref 35–104)
ALT SERPL-CCNC: 10 U/L (ref 5–33)
AST SERPL-CCNC: 20 U/L
BASOPHILS # BLD: 1 % (ref 0–2)
BASOPHILS ABSOLUTE: 0.1 K/UL (ref 0–0.2)
BILIRUB SERPL-MCNC: 0.19 MG/DL (ref 0.3–1.2)
BILIRUBIN DIRECT: <0.08 MG/DL
BILIRUBIN, INDIRECT: ABNORMAL MG/DL (ref 0–1)
DIFFERENTIAL TYPE: ABNORMAL
EOSINOPHILS RELATIVE PERCENT: 4 % (ref 0–4)
GLOBULIN: ABNORMAL G/DL (ref 1.5–3.8)
HCT VFR BLD CALC: 37.6 % (ref 36–46)
HEMOGLOBIN: 12.2 G/DL (ref 12–16)
IMMATURE GRANULOCYTES: ABNORMAL %
LYMPHOCYTES # BLD: 30 % (ref 24–44)
MCH RBC QN AUTO: 30.6 PG (ref 26–34)
MCHC RBC AUTO-ENTMCNC: 32.4 G/DL (ref 31–37)
MCV RBC AUTO: 94.5 FL (ref 80–100)
MONOCYTES # BLD: 10 % (ref 1–7)
NRBC AUTOMATED: ABNORMAL PER 100 WBC
PDW BLD-RTO: 14.3 % (ref 11.5–14.9)
PLATELET # BLD: 278 K/UL (ref 150–450)
PLATELET ESTIMATE: ABNORMAL
PMV BLD AUTO: 8.5 FL (ref 6–12)
RBC # BLD: 3.98 M/UL (ref 4–5.2)
RBC # BLD: ABNORMAL 10*6/UL
SEG NEUTROPHILS: 55 % (ref 36–66)
SEGMENTED NEUTROPHILS ABSOLUTE COUNT: 3.5 K/UL (ref 1.3–9.1)
TOTAL PROTEIN: 7.1 G/DL (ref 6.4–8.3)
WBC # BLD: 6.5 K/UL (ref 3.5–11)
WBC # BLD: ABNORMAL 10*3/UL

## 2021-03-11 PROCEDURE — 80076 HEPATIC FUNCTION PANEL: CPT

## 2021-03-11 PROCEDURE — 36415 COLL VENOUS BLD VENIPUNCTURE: CPT

## 2021-03-11 PROCEDURE — 85025 COMPLETE CBC W/AUTO DIFF WBC: CPT

## 2021-03-16 ENCOUNTER — OFFICE VISIT (OUTPATIENT)
Dept: FAMILY MEDICINE CLINIC | Age: 66
End: 2021-03-16
Payer: MEDICARE

## 2021-03-16 VITALS
HEART RATE: 75 BPM | SYSTOLIC BLOOD PRESSURE: 97 MMHG | HEIGHT: 67 IN | WEIGHT: 202.8 LBS | TEMPERATURE: 97.3 F | OXYGEN SATURATION: 96 % | BODY MASS INDEX: 31.83 KG/M2 | DIASTOLIC BLOOD PRESSURE: 60 MMHG

## 2021-03-16 DIAGNOSIS — J44.9 CHRONIC OBSTRUCTIVE PULMONARY DISEASE, UNSPECIFIED COPD TYPE (HCC): ICD-10-CM

## 2021-03-16 DIAGNOSIS — Z11.59 NEED FOR HEPATITIS C SCREENING TEST: ICD-10-CM

## 2021-03-16 DIAGNOSIS — F41.1 GENERALIZED ANXIETY DISORDER: ICD-10-CM

## 2021-03-16 DIAGNOSIS — Z13.31 POSITIVE DEPRESSION SCREENING: ICD-10-CM

## 2021-03-16 DIAGNOSIS — Z72.89 OTHER PROBLEMS RELATED TO LIFESTYLE: ICD-10-CM

## 2021-03-16 DIAGNOSIS — E66.09 CLASS 1 OBESITY DUE TO EXCESS CALORIES WITHOUT SERIOUS COMORBIDITY WITH BODY MASS INDEX (BMI) OF 31.0 TO 31.9 IN ADULT: ICD-10-CM

## 2021-03-16 DIAGNOSIS — Z78.0 ASYMPTOMATIC MENOPAUSAL STATE: ICD-10-CM

## 2021-03-16 DIAGNOSIS — R07.89 CHEST DISCOMFORT: ICD-10-CM

## 2021-03-16 DIAGNOSIS — Z00.00 ROUTINE GENERAL MEDICAL EXAMINATION AT A HEALTH CARE FACILITY: Primary | ICD-10-CM

## 2021-03-16 PROCEDURE — G8431 POS CLIN DEPRES SCRN F/U DOC: HCPCS | Performed by: FAMILY MEDICINE

## 2021-03-16 PROCEDURE — 1090F PRES/ABSN URINE INCON ASSESS: CPT | Performed by: FAMILY MEDICINE

## 2021-03-16 PROCEDURE — 99213 OFFICE O/P EST LOW 20 MIN: CPT | Performed by: FAMILY MEDICINE

## 2021-03-16 PROCEDURE — 3017F COLORECTAL CA SCREEN DOC REV: CPT | Performed by: FAMILY MEDICINE

## 2021-03-16 PROCEDURE — 1036F TOBACCO NON-USER: CPT | Performed by: FAMILY MEDICINE

## 2021-03-16 PROCEDURE — G8400 PT W/DXA NO RESULTS DOC: HCPCS | Performed by: FAMILY MEDICINE

## 2021-03-16 PROCEDURE — G8417 CALC BMI ABV UP PARAM F/U: HCPCS | Performed by: FAMILY MEDICINE

## 2021-03-16 PROCEDURE — G8427 DOCREV CUR MEDS BY ELIG CLIN: HCPCS | Performed by: FAMILY MEDICINE

## 2021-03-16 PROCEDURE — 1123F ACP DISCUSS/DSCN MKR DOCD: CPT | Performed by: FAMILY MEDICINE

## 2021-03-16 PROCEDURE — G8926 SPIRO NO PERF OR DOC: HCPCS | Performed by: FAMILY MEDICINE

## 2021-03-16 PROCEDURE — G0439 PPPS, SUBSEQ VISIT: HCPCS | Performed by: FAMILY MEDICINE

## 2021-03-16 PROCEDURE — 3023F SPIROM DOC REV: CPT | Performed by: FAMILY MEDICINE

## 2021-03-16 PROCEDURE — G8482 FLU IMMUNIZE ORDER/ADMIN: HCPCS | Performed by: FAMILY MEDICINE

## 2021-03-16 PROCEDURE — 4040F PNEUMOC VAC/ADMIN/RCVD: CPT | Performed by: FAMILY MEDICINE

## 2021-03-16 RX ORDER — FLUOXETINE 10 MG/1
10 CAPSULE ORAL DAILY
Qty: 30 CAPSULE | Refills: 3 | Status: SHIPPED | OUTPATIENT
Start: 2021-03-16 | End: 2021-03-29 | Stop reason: SDUPTHER

## 2021-03-16 RX ORDER — PHENTERMINE HYDROCHLORIDE 37.5 MG/1
37.5 TABLET ORAL
Qty: 30 TABLET | Refills: 0 | Status: SHIPPED | OUTPATIENT
Start: 2021-03-16 | End: 2021-04-15

## 2021-03-16 ASSESSMENT — LIFESTYLE VARIABLES
HOW OFTEN DURING THE LAST YEAR HAVE YOU NEEDED AN ALCOHOLIC DRINK FIRST THING IN THE MORNING TO GET YOURSELF GOING AFTER A NIGHT OF HEAVY DRINKING: 0
HAVE YOU OR SOMEONE ELSE BEEN INJURED AS A RESULT OF YOUR DRINKING: 0
HOW OFTEN DURING THE LAST YEAR HAVE YOU FAILED TO DO WHAT WAS NORMALLY EXPECTED FROM YOU BECAUSE OF DRINKING: 0
HOW MANY STANDARD DRINKS CONTAINING ALCOHOL DO YOU HAVE ON A TYPICAL DAY: 0
HAS A RELATIVE, FRIEND, DOCTOR, OR ANOTHER HEALTH PROFESSIONAL EXPRESSED CONCERN ABOUT YOUR DRINKING OR SUGGESTED YOU CUT DOWN: 0

## 2021-03-16 ASSESSMENT — PATIENT HEALTH QUESTIONNAIRE - PHQ9
10. IF YOU CHECKED OFF ANY PROBLEMS, HOW DIFFICULT HAVE THESE PROBLEMS MADE IT FOR YOU TO DO YOUR WORK, TAKE CARE OF THINGS AT HOME, OR GET ALONG WITH OTHER PEOPLE: 1
6. FEELING BAD ABOUT YOURSELF - OR THAT YOU ARE A FAILURE OR HAVE LET YOURSELF OR YOUR FAMILY DOWN: 2
3. TROUBLE FALLING OR STAYING ASLEEP: 0
7. TROUBLE CONCENTRATING ON THINGS, SUCH AS READING THE NEWSPAPER OR WATCHING TELEVISION: 2
SUM OF ALL RESPONSES TO PHQ QUESTIONS 1-9: 10
8. MOVING OR SPEAKING SO SLOWLY THAT OTHER PEOPLE COULD HAVE NOTICED. OR THE OPPOSITE, BEING SO FIGETY OR RESTLESS THAT YOU HAVE BEEN MOVING AROUND A LOT MORE THAN USUAL: 0
SUM OF ALL RESPONSES TO PHQ9 QUESTIONS 1 & 2: 3

## 2021-03-16 ASSESSMENT — COLUMBIA-SUICIDE SEVERITY RATING SCALE - C-SSRS
1. WITHIN THE PAST MONTH, HAVE YOU WISHED YOU WERE DEAD OR WISHED YOU COULD GO TO SLEEP AND NOT WAKE UP?: NO
6. HAVE YOU EVER DONE ANYTHING, STARTED TO DO ANYTHING, OR PREPARED TO DO ANYTHING TO END YOUR LIFE?: NO

## 2021-03-16 NOTE — PATIENT INSTRUCTIONS
Personalized Preventive Plan for Rex Holbrook - 3/16/2021  Medicare offers a range of preventive health benefits. Some of the tests and screenings are paid in full while other may be subject to a deductible, co-insurance, and/or copay. Some of these benefits include a comprehensive review of your medical history including lifestyle, illnesses that may run in your family, and various assessments and screenings as appropriate. After reviewing your medical record and screening and assessments performed today your provider may have ordered immunizations, labs, imaging, and/or referrals for you. A list of these orders (if applicable) as well as your Preventive Care list are included within your After Visit Summary for your review. Other Preventive Recommendations:    · A preventive eye exam performed by an eye specialist is recommended every 1-2 years to screen for glaucoma; cataracts, macular degeneration, and other eye disorders. · A preventive dental visit is recommended every 6 months. · Try to get at least 150 minutes of exercise per week or 10,000 steps per day on a pedometer . · Order or download the FREE \"Exercise & Physical Activity: Your Everyday Guide\" from [employer]. Call 6-936.108.7069 or search [employer] online. · You need 9021-7152 mg of calcium and 6151-8848 IU of vitamin D per day. It is possible to meet your calcium requirement with diet alone, but a vitamin D supplement is usually necessary to meet this goal.  · When exposed to the sun, use a sunscreen that protects against both UVA and UVB radiation with an SPF of 30 or greater. Reapply every 2 to 3 hours or after sweating, drying off with a towel, or swimming. · Always wear a seat belt when traveling in a car. Always wear a helmet when riding a bicycle or motorcycle. Heart-Healthy Diet   Sodium, Fat, and Cholesterol Controlled Diet       What Is a Heart Healthy Diet?    A heart-healthy diet is one that limits sodium , certain types of fat , and cholesterol . This type of diet is recommended for:   People with any form of cardiovascular disease (eg, coronary heart disease , peripheral vascular disease , previous heart attack , previous stroke )   People with risk factors for cardiovascular disease, such as high blood pressure , high cholesterol , or diabetes   Anyone who wants to lower their risk of developing cardiovascular disease   Sodium    Sodium is a mineral found in many foods. In general, most people consume much more sodium than they need. Diets high in sodium can increase blood pressure and lead to edema (water retention). On a heart-healthy diet, you should consume no more than 2,300 mg (milligrams) of sodium per dayabout the amount in one teaspoon of table salt. The foods highest in sodium include table salt (about 50% sodium), processed foods, convenience foods, and preserved foods. Cholesterol    Cholesterol is a fat-like, waxy substance in your blood. Our bodies make some cholesterol. It is also found in animal products, with the highest amounts in fatty meat, egg yolks, whole milk, cheese, shellfish, and organ meats. On a heart-healthy diet, you should limit your cholesterol intake to less than 200 mg per day. It is normal and important to have some cholesterol in your bloodstream. But too much cholesterol can cause plaque to build up within your arteries, which can eventually lead to a heart attack or stroke. The two types of cholesterol that are most commonly referred to are:   Low-density lipoprotein (LDL) cholesterol  Also known as bad cholesterol, this is the cholesterol that tends to build up along your arteries. Bad cholesterol levels are increased by eating fats that are saturated or hydrogenated. Optimal level of this cholesterol is less than 100. Over 130 starts to get risky for heart disease.    High-density lipoprotein (HDL) cholesterol  Also known as good cholesterol, this type of cholesterol actually carries cholesterol away from your arteries and may, therefore, help lower your risk of having a heart attack. You want this level to be high (ideally greater than 60). It is a risk to have a level less than 40. You can raise this good cholesterol by eating olive oil, canola oil, avocados, or nuts. Exercise raises this level, too. Fat    Fat is calorie dense and packs a lot of calories into a small amount of food. Even though fats should be limited due to their high calorie content, not all fats are bad. In fact, some fats are quite healthful. Fat can be broken down into four main types. The good-for-you fats are:   Monounsaturated fat  found in oils such as olive and canola, avocados, and nuts and natural nut butters; can decrease cholesterol levels, while keeping levels of HDL cholesterol high   Polyunsaturated fat  found in oils such as safflower, sunflower, soybean, corn, and sesame; can decrease total cholesterol and LDL cholesterol   Omega-3 fatty acids  particularly those found in fatty fish (such as salmon, trout, tuna, mackerel, herring, and sardines); can decrease risk of arrhythmias, decrease triglyceride levels, and slightly lower blood pressure   The fats that you want to limit are:   Saturated fat  found in animal products, many fast foods, and a few vegetables; increases total blood cholesterol, including LDL levels   Animal fats that are saturated include: butter, lard, whole-milk dairy products, meat fat, and poultry skin   Vegetable fats that are saturated include: hydrogenated shortening, palm oil, coconut oil, cocoa butter   Hydrogenated or trans fat  found in margarine and vegetable shortening, most shelf stable snack foods, and fried foods; increases LDL and decreases HDL     It is generally recommended that you limit your total fat for the day to less than 30% of your total calories.  If you follow an 1800-calorie heart healthy diet, for example, this would mean 60 grams of fat or less per day. Saturated fat and trans fat in your diet raises your blood cholesterol the most, much more than dietary cholesterol does. For this reason, on a heart-healthy diet, less than 7% of your calories should come from saturated fat and ideally 0% from trans fat. On an 1800-calorie diet, this translates into less than 14 grams of saturated fat per day, leaving 46 grams of fat to come from mono- and polyunsaturated fats.    Food Choices on a Heart Healthy Diet   Food Category   Foods Recommended   Foods to Avoid   Grains   Breads and rolls without salted tops Most dry and cooked cereals Unsalted crackers and breadsticks Low-sodium or homemade breadcrumbs or stuffing All rice and pastas   Breads, rolls, and crackers with salted tops High-fat baked goods (eg, muffins, donuts, pastries) Quick breads, self-rising flour, and biscuit mixes Regular bread crumbs Instant hot cereals Commercially prepared rice, pasta, or stuffing mixes   Vegetables   Most fresh, frozen, and low-sodium canned vegetables Low-sodium and salt-free vegetable juices Canned vegetables if unsalted or rinsed   Regular canned vegetables and juices, including sauerkraut and pickled vegetables Frozen vegetables with sauces Commercially prepared potato and vegetable mixes   Fruits   Most fresh, frozen, and canned fruits All fruit juices   Fruits processed with salt or sodium   Milk   Nonfat or low-fat (1%) milk Nonfat or low-fat yogurt Cottage cheese, low-fat ricotta, cheeses labeled as low-fat and low-sodium   Whole milk Reduced-fat (2%) milk Malted and chocolate milk Full fat yogurt Most cheeses (unless low-fat and low salt) Buttermilk (no more than 1 cup per week)   Meats and Beans   Lean cuts of fresh or frozen beef, veal, lamb, or pork (look for the word loin) Fresh or frozen poultry without the skin Fresh or frozen fish and some shellfish Egg whites and egg substitutes (Limit whole eggs to three per and cholesterol amounts. For products low in sodium, look for sodium free, very low sodium, low sodium, no added salt, and unsalted   Skip the salt when cooking or at the table; if food needs more flavor, get creative and try out different herbs and spices. Garlic and onion also add substantial flavor to foods. Trim any visible fat off meat and poultry before cooking, and drain the fat off after zuniga. Use cooking methods that require little or no added fat, such as grilling, boiling, baking, poaching, broiling, roasting, steaming, stir-frying, and sauting. Avoid fast food and convenience food. They tend to be high in saturated and trans fat and have a lot of added salt. Talk to a registered dietitian for individualized diet advice. Last Reviewed: March 2011 Toñito Mckeon MS, MPH, RD   Updated: 3/29/2011   ·     Keep Your Memory Davion Goodness       Many factors can affect your ability to remembera hectic lifestyle, aging, stress, chronic disease, and certain medicines. But, there are steps you can take to sharpen your mind and help preserve your memory. Challenge Your Brain   Regularly challenging your mind may help keeps it in top shape. Good mental exercises include:   Crossword puzzlesUse a dictionary if you need it; you will learn more that way. Brainteasers Try some! Crafts, such as wood working and sewing   Hobbies, such as gardening and building model airplanes   SocializingVisit old friends or join groups to meet new ones. Reading   Learning a new language   Taking a class, whether it be art history or ginger chi   TravelingExperience the food, history, and culture of your destination   Learning to use a computer   Going to museums, the theater, or thought-provoking movies   Changing things in your daily life, such as reversing your pattern in the grocery store or brushing your teeth using your nondominant hand   Use Memory Aids   There is no need to remember every detail on your own.  These memory aids can help:   Calendars and day planners   Electronic organizers to store all sorts of helpful informationThese devices can \"beep\" to remind you of appointments. A book of days to record birthdays, anniversaries, and other occasions that occur on the same date every year   Detailed \"to-do\" lists and strategically placed sticky notes   Quick \"study\" sessionsBefore a gathering, review who will be there so their names will be fresh in your mind. Establish routinesFor example, keep your keys, wallet, and umbrella in the same place all the time or take medicine with your 8:00 AM glass of juice   Live a Healthy Life   Many actions that will keep your body strong will do the same for your mind. For example:   Talk to Your Doctor About Herbs and Supplements    Malnutrition and vitamin deficiencies can impair your mental function. For example, vitamin B12 deficiency can cause a range of symptoms, including confusion. But, what if your nutritional needs are being met? Can herbs and supplements still offer a benefit? Researchers have investigated a range of natural remedies, such as ginkgo , ginseng , and the supplement phosphatidylserine (PS). So far, though, the evidence is inconsistent as to whether these products can improve memory or thinking. If you are interested in taking herbs and supplements, talk to your doctor first because they may interact with other medicines that you are taking. Exercise Regularly    Among the many benefits of regular exercise are increased blood flow to the brain and decreased risk of certain diseases that can interfere with memory function. One study found that even moderate exercise has a beneficial effect. Examples of \"moderate\" exercise include:   Playing 18 holes of golf once a week, without a cart   Playing tennis twice a week   Walking one mile per day   Manage Stress    It can be tough to remember what is important when your mind is cluttered.  Make time for relaxation. Choose activities that calm you down, and make it routine. Manage Chronic Conditions    Side effects of high blood pressure , diabetes, and heart disease can interfere with mental function. Many of the lifestyle steps discussed here can help manage these conditions. Strive to eat a healthy diet, exercise regularly, get stress under control, and follow your doctor's advice for your condition. Minimize Medications    Talk to your doctor about the medicines that you take. Some may be unnecessary. Also, healthy lifestyle habits may lower the need for certain drugs. Last Reviewed: April 2010 Scott Quezada MD   Updated: 4/13/2010   ·     [address]       As we get older, changes in balance, gait, strength, vision, hearing, and cognition make even the most youthful senior more prone to accidents. Falls are one of the leading health risks for older people. This increased risk of falling is related to:   Aging process (eg, decreased muscle strength, slowed reflexes)   Higher incidence of chronic health problems (eg, arthritis, diabetes) that may limit mobility, agility or sensory awareness   Side effects of medicine (eg, dizziness, blurred vision)especially medicines like prescription pain medicines and drugs used to treat mental health conditions   Depending on the brittleness of your bones, the consequences of a fall can be serious and long lasting. Home Life   Research by the Association of Aging [facility]) shows that some home accidents among older adults can be prevented by making simple lifestyle changes and basic modifications and repairs to the home environment. Here are some lifestyle changes that experts recommend:   Have your hearing and vision checked regularly. Be sure to wear prescription glasses that are right for you. Speak to your doctor or pharmacist about the possible side effects of your medicines. A number of medicines can cause dizziness.    If you have problems with sleep, talk to your doctor. Limit your intake of alcohol. If necessary, use a cane or walker to help maintain your balance. Wear supportive, rubber-soled shoes, even at home. If you live in a region that gets wintry weather, you may want to put special cleats on your shoes to prevent you from slipping on the snow and ice. Exercise regularly to help maintain muscle tone, agility, and balance. Always hold the banister when going up or down stairs. Also, use  bars when getting in or out of the bath or shower, or using the toilet. To avoid dizziness, get up slowly from a lying down position. Sit up first, dangling your legs for a minute or two before rising to a standing position. Overall Home Safety Check   According to the [employer]'s \"Older Consumer Home Safety Checklist,\" it is important to check for potential hazards in each room. And remember, proper lighting is an essential factor in home safety. If you cannot see clearly, you are more likely to fall. Important questions to ask yourself include:   Are lamp, electric, extension, and telephone cords placed out of the flow of traffic and maintained in good condition? Have frayed cords been replaced? Are all small rugs and runners slip resistant? If not, you can secure them to the floor with a special double-sided carpet tape. Are smoke detectors properly locatedone on every floor of your home and one outside of every sleeping area? Are they in good working order? Are batteries replaced at least once a year? Do you have a well-maintained carbon monoxide detector outside every sleeping are in your home? Does your furniture layout leave plenty of space to maneuver between and around chairs, tables, beds, and sofas? Are hallways, stairs and passages between rooms well lit? Can you reach a lamp without getting out of bed? Are floor surfaces well maintained?  Shag rugs, high-pile carpeting, tile floors, and polished wood floors can be particularly slippery. Stairs should always have handrails and be carpeted or fitted with a non-skid tread. Is your telephone easily reachable. Is the cord safely tucked away? Room by Room   According to the Association of Aging, bathrooms and rosibel are the two most potentially hazardous rooms in your home. In the Kitchen    Be sure your stove is in proper working order and always make sure burners and the oven are off before you go out or go to sleep. Keep pots on the back burners, turn handles away from the front of the stove, and keep stove clean and free of grease build-up. Kitchen ventilation systems and range exhausts should be working properly. Keep flammable objects such as towels and pot holders away from the cooking area except when in use. Make sure kitchen curtains are tied back. Move cords and appliances away from the sink and hot surfaces. If extension cords are needed, install wiring guides so they do not hang over the sink, range, or working areas. Look for coffee pots, kettles and toaster ovens with automatic shut-offs. Keep a mop handy in the kitchen so you can wipe up spills instantly. You should also have a small fire extinguisher. Arrange your kitchen with frequently used items on lower shelves to avoid the need to stand on a stepstool to reach them. Make sure countertops are well-lit to avoid injuries while cutting and preparing food. In the Bathroom    Use a non-slip mat or decals in the tub and shower, since wet, soapy tile or porcelain surfaces are extremely slippery. Make sure bathroom rugs are non-skid or tape them firmly to the floor. Bathtubs should have at least one, preferably two, grab bars, firmly attached to structural supports in the wall. (Do not use built-in soap holders or glass shower doors as grab bars.)    Tub seats fitted with non-slip material on the legs allow you to wash sitting down.  For people with limited mobility, bathtub transfer benches allow you to slide safely into the tub. Raised toilet seats and toilet safety rails are helpful for those with knee or hip problems. In the [facility]    Make sure you use a nightlight and that the area around your bed is clear of potential obstacles. Be careful with electric blankets and never go to sleep with a heating pad, which can cause serious burns even if on a low setting. Use fire-resistant mattress covers and pillows, and NEVER smoke in bed. Keep a phone next to the bed that is programmed to dial 911 at the push of a button. If you have a chronic condition, you may want to sign on with an automatic call-in service. Typically the system includes a small pendant that connects directly to an emergency medical voice-response system. You should also make arrangements to stay in contact with someonefriend, neighbor, family memberon a regular schedule. Fire Prevention   According to the [employer]. (Smoke Alarms for Every) [address] [place] [address], senior citizens are one of the two highest risk groups for death and serious injuries due to residential fires. When cooking, wear short-sleeved items, never a bulky long-sleeved robe. The [facility]'s Safety Checklist for Older Consumers emphasizes the importance of checking basements, garages, workshops and storage areas for fire hazards, such as volatile liquids, piles of old rags or clothing and overloaded circuits. Never smoke in bed or when lying down on a couch or recliner chair. Small portable electric or kerosene heaters are responsible for many home fires and should be used cautiously if at all. If you do use one, be sure to keep them away from flammable materials. In case of fire, make sure you have a pre-established emergency exit plan. Have a professional check your fireplace and other fuel-burning appliances yearly.     Helping Hands   Baby boomers entering the swenson years will continue to see the development of new products to help older adults live safely and independently in spite of age-related changes. Making Life More Livable  , by Don Azevedo, lists over 1,000 products for \"living well in the mature years,\" such as bathing and mobility aids, household security devices, ergonomically designed knives and peelers, and faucet valves and knobs for temperature control. Medical supply stores and organizations are good sources of information about products that improve your quality of life and insure your safety.      Last Reviewed: November 2009 Minus MD Immanuel   Updated: 3/7/2011     ·

## 2021-03-16 NOTE — PROGRESS NOTES
Annual Wellness Visit  Name: Dennis Mccormick Date: 3/18/2021   MRN: [id number] Sex: Female   Age: 77 y.o. Ethnicity: Non-/Non    : 1955 Race: Radha Su is here for Medicare AWV    Screenings for behavioral, psychosocial and functional/safety risks, and cognitive dysfunction are all negative except as indicated below. These results, as well as other patient data from the [address] form, are documented in Flowsheets linked to this Encounter. Allergies   Allergen Reactions    Ambien [Zolpidem Tartrate] Shortness Of Breath    Ativan [Lorazepam] Shortness Of Breath    Darvon [Propoxyphene] Other (See Comments)     Mental changes    Restoril [Temazepam]     Topamax [Topiramate]          Prior to Visit Medications    Medication Sig Taking? Authorizing Provider   FLUoxetine (PROZAC) 10 MG capsule Take 1 capsule by mouth daily Yes Edelmira Lopes MD   phentermine (ADIPEX-P) 37.5 MG tablet Take 1 tablet by mouth every morning (before breakfast) for 30 days. Yes Edelmira Lopes MD   FLUoxetine (PROZAC) 20 MG capsule Take 1 capsule by mouth BID Yes Edelmira Lopes MD   meclizine (ANTIVERT) 25 MG tablet Take 1 tablet by mouth twice daily as needed Yes Edelmira Lopes MD   traZODone (DESYREL) 100 MG tablet 1 tab at night Yes Edelmira Lopes MD   HYDROmorphone (EXALGO) 8 MG TB24 extended release tablet Take 8 mg by mouth 2 times daily. Yes Historical Provider, MD   pregabalin (LYRICA) 100 MG capsule Take 100 mg by mouth 2 times daily.  Yes Historical Provider, MD   vitamin C (ASCORBIC ACID) 500 MG tablet Take 1,000 mg by mouth 2 times daily Yes Historical Provider, MD   Calcium Carbonate-Vitamin D (OSCAL 500/200 D-3 PO) Take 500 mg by mouth daily Yes Historical Provider, MD   aspirin (ASPIRIN CHILDRENS) 81 MG chewable tablet Take 1 tablet by mouth daily Yes Chris Ramirez MD   zoster recombinant adjuvanted vaccine [facility]) 50 MCG/0.5ML SUSR injection Inject 0.5 mLs into the muscle See Admin Instructions 1 dose now and repeat in 2-6 months  Yelena Stevens MD         Past Medical History:   Diagnosis Date    Arthritis     Depression     Displacement of intervertebral disc, site unspecified, without myelopathy     [facility]    Displacement of lumbar intervertebral disc without myelopathy     [facility]    Hyperlipidemia     Lumbago     [facility]    Sprain of lumbar region     b[facility]    Sprain of lumbosacral (joint) (ligament)     [facility]    Sprain of sacrum     [facility]       Past Surgical History:   Procedure Laterality Date    ABDOMINAL EXPLORATION SURGERY      BACK SURGERY      X2    COLON SURGERY      COLONOSCOPY      COLONOSCOPY N/A 11/30/2020    COLONOSCOPY POLYPECTOMY HOT SNARE performed by Roslyn Lawson MD at [address] Right     HAND SURGERY Right 6/18/14    OVARY REMOVAL Bilateral     WRIST ARTHROPLASTY Left          Family History   Problem Relation Age of Onset    Heart Disease Father     High Blood Pressure Father        CareTeam (Including outside providers/suppliers regularly involved in providing care):   Patient Care Team:  Yelena Stevens MD as PCP - General (Family Medicine)  Yelena Stevens MD as PCP - [facility] Empaneled Provider    Wt Readings from Last 3 Encounters:   03/16/21 202 lb 12.8 oz (92 kg)   02/18/21 200 lb (90.7 kg)   12/14/20 195 lb 3.2 oz (88.5 kg)     Vitals:    03/16/21 1311   BP: 97/60   Pulse: 75   Temp: 97.3 °F (36.3 °C)   SpO2: 96%   Weight: 202 lb 12.8 oz (92 kg)   Height: 5' 7\" (1.702 m)     Body mass index is 31.76 kg/m². 76 yo female coming in today for her annual well visit. She states that she feels that she forgets things all the time. That she feels that she is not as sharp as before. She also feels depressed but she has been on Prozac 20 mg daily for multiple years.   She also complains about chest pain chest discomfort she really does not know when it comes on or when she has this discomfort she does not know if this could be related related to any anxiety. She has been seeing cardio before but she was told that there is nothing wrong. She did have a cardiac stress test in the past which was of normal limits. Patient also would like to restart Adipex which she has lost weight already in the past.    Based upon direct observation of the patient, evaluation of cognition reveals recent and remote memory intact. HENT:   BP 97/60   Pulse 75   Temp 97.3 °F (36.3 °C)   Ht 5' 7\" (1.702 m)   Wt 202 lb 12.8 oz (92 kg)   SpO2 96%   BMI 31.76 kg/m²   Constitutional: Alert and oriented. Well-nourished. Head: Normocephalic and atraumatic. Right Ear: External ear normal. TM: no bulging, erythema or fluid seen. Left Ear: External ear normal. TM: no bulging, erythema or fluid seen. Nose: Nose normal.   Mouth/Throat: mask in place. Eyes: Pupils are equal, round, and reactive to light. Right eye exhibits no discharge. Left eye exhibits no discharge. No scleral icterus. Neck: Normal range of motion. Neck supple. No JVD present. No tracheal deviation present. No thyromegaly present. Cardiovascular: Normal rate, regular rhythm, normal heart sounds. Pulmonary/Chest: Effort normal and breath sounds normal. No respiratory distress. She has no wheezes. She has no rales. Abdominal: Soft. Bowel sounds are normal.  She exhibits no distension and no mass. There is no tenderness. There is no rebound and no guarding. Musculoskeletal: Normal range of motion. She exhibits no edema or tenderness. Lymphadenopathy:    She has no cervical adenopathy. Neurological:  She is alert and oriented to person, place, and time. Cranial nerves grossly intact. No sensation problem noted. Muscle strength 5/5 throughout. Skin: Skin is warm and dry. No rash noted. No erythema. Psychiatric:  She has a normal mood and affect.  Behavior is normal.      Patient's complete Health Risk Td) 12/14/2030    Flu vaccine  Completed    Hepatitis A vaccine  Aged Out    Hepatitis B vaccine  Aged Out    Hib vaccine  Aged Out    Meningococcal (ACWY) vaccine  Aged Out     Recommendations for Diavibe Due: see orders and patient instructions/AVS.  . Recommended screening schedule for the next 5-10 years is provided to the patient in written form: see Patient Eliazar Brewer was seen today for medicare awv. Diagnoses and all orders for this visit:    Routine general medical examination at a health care facility    Positive depression screening  -     Positive Screen for Clinical Depression with a Documented Follow-up Plan   -     FLUoxetine (PROZAC) 10 MG capsule; Take 1 capsule by mouth daily    Generalized anxiety disorder  -     FLUoxetine (PROZAC) 10 MG capsule; Take 1 capsule by mouth daily    Chest discomfort  -     AFL - Karen Brennan MD, Cardiology, [place]    Asymptomatic menopausal state  -     DEXA Bone Density Axial Skeleton; Future    Need for hepatitis C screening test    Other problems related to lifestyle  -     Hepatitis C Antibody; Future    Class 1 obesity due to excess calories without serious comorbidity with body mass index (BMI) of 31.0 to 31.9 in adult  -     phentermine (ADIPEX-P) 37.5 MG tablet; Take 1 tablet by mouth every morning (before breakfast) for 30 days. Chronic obstructive pulmonary disease, unspecified COPD type ([address]           I did review with the patient recent blood program.  We will increase Prozac to 30 mg a day. Patient will see cardiologist for this discomfort in her chest but I feel this is more so anxiety related. Blood pressure was quite good today. First prescription of Adipex provided. Patient will continue current diet and exercise regimen. I discussed with her to exercise at least 30 minutes 5 times a week. Decrease carbohydrate intake. Increase fibers and protein. See me back in 4 weeks for weight check.   Call if any changes. Stop Adipex if you have any side effects. Call or return to clinic prn if these symptoms worsen or fail to improve as anticipated. I have reviewed the instructions with the patient, answering all questions to her satisfaction.     (Please note that portions of this note were completed with a voice recognition program. Efforts were made to edit the dictations but occasionally words are mis-transcribed.)

## 2021-03-29 DIAGNOSIS — Z13.31 POSITIVE DEPRESSION SCREENING: ICD-10-CM

## 2021-03-29 DIAGNOSIS — F41.1 GENERALIZED ANXIETY DISORDER: ICD-10-CM

## 2021-03-29 RX ORDER — FLUOXETINE 10 MG/1
10 CAPSULE ORAL DAILY
Qty: 30 CAPSULE | Refills: 5 | Status: SHIPPED | OUTPATIENT
Start: 2021-03-29 | End: 2021-04-05 | Stop reason: SDUPTHER

## 2021-04-05 ENCOUNTER — TELEPHONE (OUTPATIENT)
Dept: FAMILY MEDICINE CLINIC | Age: 66
End: 2021-04-05

## 2021-04-05 DIAGNOSIS — F41.1 GENERALIZED ANXIETY DISORDER: ICD-10-CM

## 2021-04-05 DIAGNOSIS — Z13.31 POSITIVE DEPRESSION SCREENING: ICD-10-CM

## 2021-04-05 RX ORDER — FLUOXETINE 10 MG/1
10 CAPSULE ORAL 2 TIMES DAILY
Qty: 60 CAPSULE | Refills: 5 | Status: SHIPPED | OUTPATIENT
Start: 2021-04-05 | End: 2021-10-07

## 2021-04-08 ENCOUNTER — APPOINTMENT (OUTPATIENT)
Dept: GENERAL RADIOLOGY | Age: 66
DRG: 192 | End: 2021-04-08
Payer: MEDICARE

## 2021-04-08 ENCOUNTER — APPOINTMENT (OUTPATIENT)
Dept: CT IMAGING | Age: 66
DRG: 192 | End: 2021-04-08
Payer: MEDICARE

## 2021-04-08 ENCOUNTER — HOSPITAL ENCOUNTER (INPATIENT)
Age: 66
LOS: 3 days | Discharge: HOME | DRG: 192 | End: 2021-04-11
Attending: EMERGENCY MEDICINE | Admitting: INTERNAL MEDICINE
Payer: MEDICARE

## 2021-04-08 DIAGNOSIS — J18.9 PNEUMONIA OF BOTH LUNGS DUE TO INFECTIOUS ORGANISM, UNSPECIFIED PART OF LUNG: Primary | ICD-10-CM

## 2021-04-08 DIAGNOSIS — R42 DIZZINESS: ICD-10-CM

## 2021-04-08 LAB
ABSOLUTE EOS #: 0.3 K/UL (ref 0–0.4)
ABSOLUTE IMMATURE GRANULOCYTE: ABNORMAL K/UL (ref 0–0.3)
ABSOLUTE LYMPH #: 1.5 K/UL (ref 1–4.8)
ABSOLUTE MONO #: 0.6 K/UL (ref 0.1–1.3)
ALBUMIN SERPL-MCNC: 3.9 G/DL (ref 3.5–5.2)
ALBUMIN/GLOBULIN RATIO: ABNORMAL (ref 1–2.5)
ALP BLD-CCNC: 87 U/L (ref 35–104)
ALT SERPL-CCNC: 11 U/L (ref 5–33)
ANION GAP SERPL CALCULATED.3IONS-SCNC: 7 MMOL/L (ref 9–17)
AST SERPL-CCNC: 18 U/L
BASOPHILS # BLD: 1 % (ref 0–2)
BASOPHILS ABSOLUTE: 0.1 K/UL (ref 0–0.2)
BILIRUB SERPL-MCNC: 0.23 MG/DL (ref 0.3–1.2)
BNP INTERPRETATION: NORMAL
BUN BLDV-MCNC: 23 MG/DL (ref 8–23)
BUN/CREAT BLD: ABNORMAL (ref 9–20)
C-REACTIVE PROTEIN: 8 MG/L (ref 0–5)
CALCIUM SERPL-MCNC: 9.8 MG/DL (ref 8.6–10.4)
CHLORIDE BLD-SCNC: 102 MMOL/L (ref 98–107)
CO2: 28 MMOL/L (ref 20–31)
CREAT SERPL-MCNC: 0.75 MG/DL (ref 0.5–0.9)
D-DIMER QUANTITATIVE: 0.67 MG/L FEU (ref 0–0.59)
DIFFERENTIAL TYPE: ABNORMAL
EOSINOPHILS RELATIVE PERCENT: 3 % (ref 0–4)
FERRITIN: 115 UG/L (ref 13–150)
GFR AFRICAN AMERICAN: >60 ML/MIN
GFR NON-AFRICAN AMERICAN: >60 ML/MIN
GFR SERPL CREATININE-BSD FRML MDRD: ABNORMAL ML/MIN/{1.73_M2}
GFR SERPL CREATININE-BSD FRML MDRD: ABNORMAL ML/MIN/{1.73_M2}
GLUCOSE BLD-MCNC: 102 MG/DL (ref 70–99)
HCT VFR BLD CALC: 38.3 % (ref 36–46)
HEMOGLOBIN: 12.5 G/DL (ref 12–16)
IMMATURE GRANULOCYTES: ABNORMAL %
LYMPHOCYTES # BLD: 17 % (ref 24–44)
MCH RBC QN AUTO: 31 PG (ref 26–34)
MCHC RBC AUTO-ENTMCNC: 32.8 G/DL (ref 31–37)
MCV RBC AUTO: 94.6 FL (ref 80–100)
MONOCYTES # BLD: 7 % (ref 1–7)
NRBC AUTOMATED: ABNORMAL PER 100 WBC
PDW BLD-RTO: 13.9 % (ref 11.5–14.9)
PLATELET # BLD: 271 K/UL (ref 150–450)
PLATELET ESTIMATE: ABNORMAL
PMV BLD AUTO: 8.5 FL (ref 6–12)
POTASSIUM SERPL-SCNC: 4.2 MMOL/L (ref 3.7–5.3)
PRO-BNP: 29 PG/ML
PROCALCITONIN: 0.06 NG/ML
RBC # BLD: 4.05 M/UL (ref 4–5.2)
RBC # BLD: ABNORMAL 10*6/UL
SARS-COV-2, RAPID: NOT DETECTED
SEG NEUTROPHILS: 72 % (ref 36–66)
SEGMENTED NEUTROPHILS ABSOLUTE COUNT: 6.6 K/UL (ref 1.3–9.1)
SODIUM BLD-SCNC: 137 MMOL/L (ref 135–144)
SPECIMEN DESCRIPTION: NORMAL
TOTAL PROTEIN: 7.1 G/DL (ref 6.4–8.3)
TROPONIN INTERP: NORMAL
TROPONIN T: NORMAL NG/ML
TROPONIN, HIGH SENSITIVITY: <6 NG/L (ref 0–14)
WBC # BLD: 9 K/UL (ref 3.5–11)
WBC # BLD: ABNORMAL 10*3/UL

## 2021-04-08 PROCEDURE — 87635 SARS-COV-2 COVID-19 AMP PRB: CPT

## 2021-04-08 PROCEDURE — 2580000003 HC RX 258: Performed by: EMERGENCY MEDICINE

## 2021-04-08 PROCEDURE — 93005 ELECTROCARDIOGRAM TRACING: CPT | Performed by: PHYSICIAN ASSISTANT

## 2021-04-08 PROCEDURE — 71260 CT THORAX DX C+: CPT

## 2021-04-08 PROCEDURE — 96372 THER/PROPH/DIAG INJ SC/IM: CPT

## 2021-04-08 PROCEDURE — 70450 CT HEAD/BRAIN W/O DYE: CPT

## 2021-04-08 PROCEDURE — G0378 HOSPITAL OBSERVATION PER HR: HCPCS

## 2021-04-08 PROCEDURE — 83880 ASSAY OF NATRIURETIC PEPTIDE: CPT

## 2021-04-08 PROCEDURE — 36415 COLL VENOUS BLD VENIPUNCTURE: CPT

## 2021-04-08 PROCEDURE — 85025 COMPLETE CBC W/AUTO DIFF WBC: CPT

## 2021-04-08 PROCEDURE — 96365 THER/PROPH/DIAG IV INF INIT: CPT

## 2021-04-08 PROCEDURE — 6370000000 HC RX 637 (ALT 250 FOR IP): Performed by: EMERGENCY MEDICINE

## 2021-04-08 PROCEDURE — 2580000003 HC RX 258: Performed by: PHYSICIAN ASSISTANT

## 2021-04-08 PROCEDURE — 6370000000 HC RX 637 (ALT 250 FOR IP): Performed by: NURSE PRACTITIONER

## 2021-04-08 PROCEDURE — 86140 C-REACTIVE PROTEIN: CPT

## 2021-04-08 PROCEDURE — 85379 FIBRIN DEGRADATION QUANT: CPT

## 2021-04-08 PROCEDURE — 6370000000 HC RX 637 (ALT 250 FOR IP): Performed by: PHYSICIAN ASSISTANT

## 2021-04-08 PROCEDURE — 71045 X-RAY EXAM CHEST 1 VIEW: CPT

## 2021-04-08 PROCEDURE — 6360000002 HC RX W HCPCS: Performed by: INTERNAL MEDICINE

## 2021-04-08 PROCEDURE — 73562 X-RAY EXAM OF KNEE 3: CPT

## 2021-04-08 PROCEDURE — 94640 AIRWAY INHALATION TREATMENT: CPT

## 2021-04-08 PROCEDURE — 84145 PROCALCITONIN (PCT): CPT

## 2021-04-08 PROCEDURE — 99285 EMERGENCY DEPT VISIT HI MDM: CPT

## 2021-04-08 PROCEDURE — 2580000003 HC RX 258: Performed by: INTERNAL MEDICINE

## 2021-04-08 PROCEDURE — 2060000000 HC ICU INTERMEDIATE R&B

## 2021-04-08 PROCEDURE — 80053 COMPREHEN METABOLIC PANEL: CPT

## 2021-04-08 PROCEDURE — 73630 X-RAY EXAM OF FOOT: CPT

## 2021-04-08 PROCEDURE — 82728 ASSAY OF FERRITIN: CPT

## 2021-04-08 PROCEDURE — 6360000002 HC RX W HCPCS: Performed by: EMERGENCY MEDICINE

## 2021-04-08 PROCEDURE — 94761 N-INVAS EAR/PLS OXIMETRY MLT: CPT

## 2021-04-08 PROCEDURE — 6360000004 HC RX CONTRAST MEDICATION: Performed by: PHYSICIAN ASSISTANT

## 2021-04-08 PROCEDURE — 84484 ASSAY OF TROPONIN QUANT: CPT

## 2021-04-08 RX ORDER — SODIUM CHLORIDE 0.9 % (FLUSH) 0.9 %
5-40 SYRINGE (ML) INJECTION EVERY 12 HOURS SCHEDULED
Status: DISCONTINUED | OUTPATIENT
Start: 2021-04-08 | End: 2021-04-11 | Stop reason: HOSPADM

## 2021-04-08 RX ORDER — ACETAMINOPHEN 325 MG/1
650 TABLET ORAL EVERY 4 HOURS PRN
Status: DISCONTINUED | OUTPATIENT
Start: 2021-04-08 | End: 2021-04-11 | Stop reason: HOSPADM

## 2021-04-08 RX ORDER — AZITHROMYCIN 250 MG/1
500 TABLET, FILM COATED ORAL DAILY
Status: DISCONTINUED | OUTPATIENT
Start: 2021-04-09 | End: 2021-04-11 | Stop reason: HOSPADM

## 2021-04-08 RX ORDER — ONDANSETRON 2 MG/ML
4 INJECTION INTRAMUSCULAR; INTRAVENOUS EVERY 6 HOURS PRN
Status: DISCONTINUED | OUTPATIENT
Start: 2021-04-08 | End: 2021-04-11 | Stop reason: HOSPADM

## 2021-04-08 RX ORDER — 0.9 % SODIUM CHLORIDE 0.9 %
80 INTRAVENOUS SOLUTION INTRAVENOUS ONCE
Status: COMPLETED | OUTPATIENT
Start: 2021-04-08 | End: 2021-04-08

## 2021-04-08 RX ORDER — IPRATROPIUM BROMIDE AND ALBUTEROL SULFATE 2.5; .5 MG/3ML; MG/3ML
1 SOLUTION RESPIRATORY (INHALATION)
Status: DISCONTINUED | OUTPATIENT
Start: 2021-04-08 | End: 2021-04-09

## 2021-04-08 RX ORDER — OXYCODONE HYDROCHLORIDE AND ACETAMINOPHEN 5; 325 MG/1; MG/1
1 TABLET ORAL 2 TIMES DAILY PRN
Status: ON HOLD | COMMUNITY
End: 2021-04-22 | Stop reason: HOSPADM

## 2021-04-08 RX ORDER — AZITHROMYCIN 250 MG/1
500 TABLET, FILM COATED ORAL ONCE
Status: COMPLETED | OUTPATIENT
Start: 2021-04-08 | End: 2021-04-08

## 2021-04-08 RX ORDER — PROMETHAZINE HYDROCHLORIDE 25 MG/1
12.5 TABLET ORAL EVERY 6 HOURS PRN
Status: DISCONTINUED | OUTPATIENT
Start: 2021-04-08 | End: 2021-04-11 | Stop reason: HOSPADM

## 2021-04-08 RX ORDER — SODIUM CHLORIDE 9 MG/ML
INJECTION, SOLUTION INTRAVENOUS CONTINUOUS
Status: DISCONTINUED | OUTPATIENT
Start: 2021-04-08 | End: 2021-04-10

## 2021-04-08 RX ORDER — MECLIZINE HYDROCHLORIDE 25 MG/1
25 TABLET ORAL ONCE
Status: COMPLETED | OUTPATIENT
Start: 2021-04-08 | End: 2021-04-08

## 2021-04-08 RX ORDER — SODIUM CHLORIDE 0.9 % (FLUSH) 0.9 %
10 SYRINGE (ML) INJECTION PRN
Status: DISCONTINUED | OUTPATIENT
Start: 2021-04-08 | End: 2021-04-09

## 2021-04-08 RX ORDER — POLYVINYL ALCOHOL 14 MG/ML
1 SOLUTION/ DROPS OPHTHALMIC PRN
Status: DISCONTINUED | OUTPATIENT
Start: 2021-04-08 | End: 2021-04-11 | Stop reason: HOSPADM

## 2021-04-08 RX ORDER — SODIUM CHLORIDE 9 MG/ML
INJECTION, SOLUTION INTRAVENOUS CONTINUOUS
Status: DISCONTINUED | OUTPATIENT
Start: 2021-04-08 | End: 2021-04-09

## 2021-04-08 RX ORDER — SODIUM CHLORIDE 0.9 % (FLUSH) 0.9 %
5-40 SYRINGE (ML) INJECTION PRN
Status: DISCONTINUED | OUTPATIENT
Start: 2021-04-08 | End: 2021-04-11 | Stop reason: HOSPADM

## 2021-04-08 RX ORDER — ALBUTEROL SULFATE 2.5 MG/3ML
2.5 SOLUTION RESPIRATORY (INHALATION)
Status: DISCONTINUED | OUTPATIENT
Start: 2021-04-08 | End: 2021-04-10

## 2021-04-08 RX ORDER — SODIUM CHLORIDE 9 MG/ML
25 INJECTION, SOLUTION INTRAVENOUS PRN
Status: DISCONTINUED | OUTPATIENT
Start: 2021-04-08 | End: 2021-04-11 | Stop reason: HOSPADM

## 2021-04-08 RX ORDER — GUAIFENESIN 600 MG/1
600 TABLET, EXTENDED RELEASE ORAL 2 TIMES DAILY
Status: DISCONTINUED | OUTPATIENT
Start: 2021-04-08 | End: 2021-04-11 | Stop reason: HOSPADM

## 2021-04-08 RX ORDER — 0.9 % SODIUM CHLORIDE 0.9 %
1000 INTRAVENOUS SOLUTION INTRAVENOUS ONCE
Status: COMPLETED | OUTPATIENT
Start: 2021-04-08 | End: 2021-04-08

## 2021-04-08 RX ADMIN — SODIUM CHLORIDE 1000 ML: 9 INJECTION, SOLUTION INTRAVENOUS at 15:31

## 2021-04-08 RX ADMIN — AZITHROMYCIN 500 MG: 250 TABLET, FILM COATED ORAL at 18:37

## 2021-04-08 RX ADMIN — SODIUM CHLORIDE: 9 INJECTION, SOLUTION INTRAVENOUS at 22:22

## 2021-04-08 RX ADMIN — CEFTRIAXONE SODIUM 1000 MG: 1 INJECTION, POWDER, FOR SOLUTION INTRAMUSCULAR; INTRAVENOUS at 17:13

## 2021-04-08 RX ADMIN — IOPAMIDOL 75 ML: 755 INJECTION, SOLUTION INTRAVENOUS at 15:24

## 2021-04-08 RX ADMIN — GUAIFENESIN 600 MG: 600 TABLET, EXTENDED RELEASE ORAL at 22:26

## 2021-04-08 RX ADMIN — ENOXAPARIN SODIUM 40 MG: 40 INJECTION SUBCUTANEOUS at 22:26

## 2021-04-08 RX ADMIN — IPRATROPIUM BROMIDE AND ALBUTEROL SULFATE 1 AMPULE: .5; 3 SOLUTION RESPIRATORY (INHALATION) at 20:50

## 2021-04-08 RX ADMIN — SODIUM CHLORIDE, PRESERVATIVE FREE 10 ML: 5 INJECTION INTRAVENOUS at 15:25

## 2021-04-08 RX ADMIN — MECLIZINE HYDROCHLORIDE 25 MG: 25 TABLET ORAL at 14:59

## 2021-04-08 RX ADMIN — SODIUM CHLORIDE: 9 INJECTION, SOLUTION INTRAVENOUS at 17:13

## 2021-04-08 RX ADMIN — SODIUM CHLORIDE 80 ML: 9 INJECTION, SOLUTION INTRAVENOUS at 15:25

## 2021-04-08 ASSESSMENT — ENCOUNTER SYMPTOMS
NAUSEA: 0
COUGH: 1
CONSTIPATION: 0
BACK PAIN: 0
DIARRHEA: 0
ABDOMINAL PAIN: 0
SORE THROAT: 0
VOMITING: 0
WHEEZING: 0
SHORTNESS OF BREATH: 1

## 2021-04-08 ASSESSMENT — PAIN SCALES - GENERAL
PAINLEVEL_OUTOF10: 0
PAINLEVEL_OUTOF10: 9

## 2021-04-08 ASSESSMENT — VISUAL ACUITY: OU: 1

## 2021-04-08 NOTE — ED PROVIDER NOTES
Laterality Date    ABDOMINAL EXPLORATION SURGERY      BACK SURGERY      X2    COLON SURGERY      COLONOSCOPY      COLONOSCOPY N/A 11/30/2020    COLONOSCOPY POLYPECTOMY HOT SNARE performed by Marcus Ta MD at [address] Right     HAND SURGERY Right 6/18/14    OVARY REMOVAL Bilateral     WRIST ARTHROPLASTY Left      None otherwise stated in nurses notes    CURRENT MEDICATIONS       Previous Medications    ASPIRIN (ASPIRIN CHILDRENS) 81 MG CHEWABLE TABLET    Take 1 tablet by mouth daily    CALCIUM CARBONATE-VITAMIN D (OSCAL 500/200 D-3 PO)    Take 500 mg by mouth daily    FLUOXETINE (PROZAC) 10 MG CAPSULE    Take 1 capsule by mouth 2 times daily    MECLIZINE (ANTIVERT) 25 MG TABLET    Take 1 tablet by mouth twice daily as needed    OXYCODONE-ACETAMINOPHEN (PERCOCET) 5-325 MG PER TABLET    Take 1 tablet by mouth 2 times daily as needed for Pain. PHENTERMINE (ADIPEX-P) 37.5 MG TABLET    Take 1 tablet by mouth every morning (before breakfast) for 30 days. PREGABALIN (LYRICA) 150 MG CAPSULE    Take 150 mg by mouth 2 times daily. TRAZODONE (DESYREL) 100 MG TABLET    1 tab at night    VITAMIN C (ASCORBIC ACID) 500 MG TABLET    Take 1,000 mg by mouth 2 times daily    ZOSTER RECOMBINANT ADJUVANTED VACCINE (SHINGRIX) 50 MCG/0.5ML SUSR INJECTION    Inject 0.5 mLs into the muscle See Admin Instructions 1 dose now and repeat in 2-6 months       ALLERGIES     Ambien [zolpidem tartrate], Ativan [lorazepam], Darvon [propoxyphene], Restoril [temazepam], and Topamax [topiramate]    FAMILY HISTORY           Problem Relation Age of Onset    Heart Disease Father     High Blood Pressure Father      Family Status   Relation Name Status    Father  (Not Specified)      None otherwise stated in nurses notes    SOCIAL HISTORY      reports that she quit smoking about 19 months ago. Her smoking use included cigarettes. She has a 3.75 pack-year smoking history.  She has never used smokeless tobacco. She reports current alcohol use of about 2.0 standard drinks of alcohol per week. She reports that she does not use drugs. lives at home with others     PHYSICAL EXAM    (up to 7 for level 4, 8 or more for level 5)     ED Triage Vitals [04/08/21 1433]   BP Temp Temp Source Pulse Resp SpO2 Height Weight   136/64 98.1 °F (36.7 °C) Oral 73 14 96 % 5' 7\" (1.702 m) 200 lb (90.7 kg)       Physical Exam  Vitals signs and nursing note reviewed. Constitutional:       General: She is awake. Appearance: Normal appearance. She is well-developed, well-groomed and normal weight. HENT:      Head: Normocephalic and atraumatic. No raccoon eyes, Valerio's sign, abrasion, contusion, right periorbital erythema, left periorbital erythema or laceration. Nose: Nose normal.   Eyes:      General: Lids are normal. Vision grossly intact. Gaze aligned appropriately. Extraocular Movements: Extraocular movements intact. Conjunctiva/sclera: Conjunctivae normal.      Pupils: Pupils are equal, round, and reactive to light. Neck:      Musculoskeletal: Full passive range of motion without pain and normal range of motion. No neck rigidity, spinous process tenderness or muscular tenderness. Cardiovascular:      Rate and Rhythm: Normal rate and regular rhythm. Pulses: Normal pulses. Dorsalis pedis pulses are 2+ on the right side and 2+ on the left side. Posterior tibial pulses are 2+ on the right side and 2+ on the left side. Heart sounds: Normal heart sounds, S1 normal and S2 normal.   Pulmonary:      Effort: Pulmonary effort is normal. No respiratory distress. Breath sounds: Normal breath sounds and air entry. No stridor. No decreased breath sounds, wheezing, rhonchi or rales. Chest:      Chest wall: No tenderness. Abdominal:      General: Abdomen is flat. Palpations: Abdomen is soft. Tenderness: There is no abdominal tenderness.  There is no guarding or rebound. Negative signs include Mejia's sign, Rovsing's sign and McBurney's sign. Musculoskeletal:         General: Tenderness present. No swelling, deformity or signs of injury. Left hip: Normal.      Left knee: She exhibits swelling and bony tenderness. She exhibits normal range of motion, no effusion, no ecchymosis, no deformity, no laceration, no erythema, normal alignment, no LCL laxity, normal patellar mobility, normal meniscus and no MCL laxity. Tenderness found. Medial joint line tenderness noted. No lateral joint line, no MCL, no LCL and no patellar tendon tenderness noted. Left ankle: Normal.      Cervical back: Normal.      Thoracic back: Normal.      Lumbar back: Normal.      Left upper leg: Normal.      Right lower leg: No edema. Left lower leg: No edema. Left foot: Normal range of motion and normal capillary refill. Tenderness and bony tenderness present. No swelling, crepitus, deformity or laceration. Lymphadenopathy:      Cervical: No cervical adenopathy. Skin:     General: Skin is warm. Capillary Refill: Capillary refill takes less than 2 seconds. Findings: No bruising, erythema or rash. Neurological:      General: No focal deficit present. Mental Status: She is alert and oriented to person, place, and time. Mental status is at baseline. Cranial Nerves: Cranial nerves are intact. No cranial nerve deficit, dysarthria or facial asymmetry. Sensory: Sensation is intact. No sensory deficit. Motor: Motor function is intact. No weakness, tremor, atrophy, abnormal muscle tone, seizure activity or pronator drift. Coordination: Coordination is intact. Romberg sign negative. Coordination normal. Finger-Nose-Finger Test normal. Rapid alternating movements normal.      Gait: Gait is intact. Gait normal.   Psychiatric:         Behavior: Behavior is cooperative.                DIAGNOSTIC RESULTS     EKG: All EKG's are interpreted by the Emergency Department Physician who either signs or Co-signs this chart in the absence of a cardiologist.        RADIOLOGY:   All plain film, CT, MRI, and formal ultrasound images (except ED bedside ultrasound) are read by the radiologist, see reports below, unless otherwise noted in MDM or here. CT CHEST PULMONARY EMBOLISM W CONTRAST   Final Result   1. No pulmonary embolus. 2. Ground-glass opacity within both lungs, concerning for multifocal   pneumonia. 3. Emphysema and chronic subpleural reticular changes are similar to prior   examination. RECOMMENDATIONS:   Imaging features can be seen with viral pneumonia, though are nonspecific and   can occur with a variety of infectious and noninfectious processes. PneInd   Reference: https://pubs. rsna.org/doi/full/10.1148/ryct. [email or web address]         CT HEAD WO CONTRAST   Final Result   No acute intracranial abnormality. Old 1 cm focus of small vessel infarction or gliosis from prior ischemic   event in the left external capsule. It is unchanged from 09/28/2019. XR KNEE LEFT (3 VIEWS)   Final Result   No acute osseous or soft tissue abnormality. XR FOOT LEFT (MIN 3 VIEWS)   Final Result   No acute osseous or soft tissue abnormality. XR CHEST PORTABLE   Final Result   Interval increase in prominence of asymmetric bibasilar interstitial   infiltrates. Findings likely related to mild interval progression in   interstitial fibrosis      No definite acute cardiopulmonary findings             No results found.     LABS:  Labs Reviewed   CBC WITH AUTO DIFFERENTIAL - Abnormal; Notable for the following components:       Result Value    Seg Neutrophils 72 (*)     Lymphocytes 17 (*)     All other components within normal limits   COMPREHENSIVE METABOLIC PANEL W/ REFLEX TO MG FOR LOW K - Abnormal; Notable for the following components:    Glucose 102 (*)     Anion Gap 7 (*)     Total Bilirubin 0.23 (*)     All other components within normal limits Elevated Ddimer. CT chest PE reveals   1. No pulmonary embolus. 2. Ground-glass opacity within both lungs, concerning for multifocal   pneumonia. 3. Emphysema and chronic subpleural reticular changes are similar to prior   examination. Negative troponin. Normal EKG. NSR. No leukocytosis. VSS. Normal orthostatics. Rapid covid negative. Discussed with dr. Irlanda Jones. Will admit for bilateral pneumonia. Pt is well appearing. She is agreeable with admission. Dr. Reji Mcdermott spoke with admitting physician. ED Medications administered this visit:    Medications   sodium chloride flush 0.9 % injection 10 mL (10 mLs Intravenous Given 4/8/21 1525)   0.9 % sodium chloride infusion ( Intravenous New Bag 4/8/21 1713)   cefTRIAXone (ROCEPHIN) 1000 mg IVPB in 50 mL D5W minibag (1,000 mg Intravenous New Bag 4/8/21 1713)   azithromycin (ZITHROMAX) tablet 500 mg (has no administration in time range)   0.9 % sodium chloride bolus (1,000 mLs Intravenous New Bag 4/8/21 1531)   meclizine (ANTIVERT) tablet 25 mg (25 mg Oral Given 4/8/21 1459)   0.9 % sodium chloride bolus (0 mLs Intravenous Stopped 4/8/21 1526)   iopamidol (ISOVUE-370) 76 % injection 75 mL (75 mLs Intravenous Given 4/8/21 1524)       New Prescriptions from this visit:    New Prescriptions    No medications on file       Follow-up:  No follow-up provider specified. Final Impression:   1. Pneumonia of both lungs due to infectious organism, unspecified part of lung    2.  Dizziness               (Please note that portions of this note were completed with a voice recognition program.  Efforts were made to edit the dictations but occasionally words are mis-transcribed.)      (Please note that portions of this note were completed with a voice recognition program.  Efforts were made to edit the dictations but occasionally words are mis-transcribed.)    Alvino Scott. [address]ish [address], PA-C  04/08/21 1504

## 2021-04-08 NOTE — H&P
[address] [facility]     HISTORY AND PHYSICAL EXAMINATION            Date:   4/9/2021  Patientname:  Anneliese Goins  Date of admission:  4/8/2021  2:29 PM  MRN:   [id number]  Account:  [de-identified]  YOB: 1955  PCP:    Lazara Emmanuel MD  Room:   2111/2111-01  Code Status:    Full Code    CHIEF COMPLAINT     Chief Complaint   Patient presents with    Dizziness    Fall       HISTORY OF PRESENT ILLNESS  (Character, Onset, Location, Duration,  Exacerbating/RelievingFactors, Radiation,   Associated Symptoms, Severity )      The patient is a 77 y.o.  female, with a history of COPD, dizziness, Ménière's disease, and obesity, who presents with dizziness and fall. According to patient, she was standing at the kitchen counter today when she became dizzy and lightheaded and fell to the floor, hitting her head and left knee on the cabinet, with her left foot entangled under the cabinet. Additionally, patient reports that she had a \"cold\" a couple of months ago and states that the cough has been lingering for about 6 weeks. Symptoms are associated with left knee pain and shortness of breath with activity - especially with climbing stairs. Denies fever, chills, chest pain, abdominal pain, nausea, vomiting, diarrhea, and urinary symptoms. Denies loss of consciousness. There are no aggravating or alleviating factors. Symptoms are reported as     PAST MEDICAL HISTORY   Patient  has a past medical history of Arthritis, Depression, Displacement of intervertebral disc, site unspecified, without myelopathy, Displacement of lumbar intervertebral disc without myelopathy, Hyperlipidemia, Lumbago, Sprain of lumbar region, Sprain of lumbosacral (joint) (ligament), and Sprain of sacrum. PAST SURGICAL HISTORY    Patient  has a past surgical history that includes Ovary removal (Bilateral); Wrist Arthroplasty (Left); Dilation and curettage of uterus; Abdominal exploration surgery;  Colon surgery; back surgery; Colonoscopy; Foot surgery (Right); Hand surgery (Right, 6/18/14); and Colonoscopy (N/A, 11/30/2020). FAMILY HISTORY    Patient family history includes Heart Disease in her father; High Blood Pressure in her father. SOCIAL HISTORY    Patient  reports that she quit smoking about 19 months ago. Her smoking use included cigarettes. She has a 3.75 pack-year smoking history. She has never used smokeless tobacco. She reports current alcohol use of about 2.0 standard drinks of alcohol per week. She reports that she does not use drugs. HOME MEDICATIONS        Prior to Admission medications    Medication Sig Start Date End Date Taking? Authorizing Provider   oxyCODONE-acetaminophen (PERCOCET) 5-325 MG per tablet Take 1 tablet by mouth 2 times daily as needed for Pain. Yes Historical Provider, MD   FLUoxetine (PROZAC) 10 MG capsule Take 1 capsule by mouth 2 times daily 4/5/21  Yes Shamika Aguilar MD   phentermine (ADIPEX-P) 37.5 MG tablet Take 1 tablet by mouth every morning (before breakfast) for 30 days. 3/16/21 4/15/21 Yes Shamika Aguilar MD   meclizine (ANTIVERT) 25 MG tablet Take 1 tablet by mouth twice daily as needed 1/15/21  Yes Shamika Aguilar MD   traZODone (DESYREL) 100 MG tablet 1 tab at night 12/14/20  Yes Shamika Aguilar MD   pregabalin (LYRICA) 150 MG capsule Take 150 mg by mouth 2 times daily.     Yes Historical Provider, MD   vitamin C (ASCORBIC ACID) 500 MG tablet Take 1,000 mg by mouth 2 times daily   Yes Historical Provider, MD   Calcium Carbonate-Vitamin D (OSCAL 500/200 D-3 PO) Take 500 mg by mouth daily   Yes Historical Provider, MD   aspirin (ASPIRIN CHILDRENS) 81 MG chewable tablet Take 1 tablet by mouth daily 4/5/17  Yes Greta Jiménez MD   zoster recombinant adjuvanted vaccine [facility]) 50 MCG/0.5ML SUSR injection Inject 0.5 mLs into the muscle See Admin Instructions 1 dose now and repeat in 2-6 months 12/14/20 6/12/21  Shamika Aguilar MD is no distension. Palpations: Abdomen is soft. Tenderness: There is no abdominal tenderness. There is no guarding. Musculoskeletal: Normal range of motion. General: Tenderness (dorsal surface left foot and left knee) present. Lymphadenopathy:      Cervical: No cervical adenopathy. Skin:     General: Skin is warm and dry. Coloration: Skin is not pale. Findings: Bruising (dorsal surface of left food) present. No erythema or rash. Neurological:      Mental Status: She is alert and oriented to person, place, and time. Motor: No seizure activity. Coordination: Coordination normal.   Psychiatric:         Behavior: Behavior normal.         Thought Content: Thought content normal.       DIAGNOSTICS      EKG:   EKG 12 Lead [[id number]]    Collected: 04/08/21 1538    Updated: 04/08/21 1539     Ventricular Rate 66 BPM    Atrial Rate 66 BPM    P-R Interval 170 ms    QRS Duration 86 ms    Q-T Interval 402 ms    QTc Calculation (Bazett) 421 ms    P Axis -2 degrees    R Axis 26 degrees    T Axis 10 degrees   Narrative:     Normal sinus rhythm   Normal ECG   When compared with ECG of 16-OCT-2020 18:50,   Nonspecific T wave abnormality, improved in Anterior leads     Labs:  CBC:   Recent Labs     04/08/21  1452   WBC 9.0   HGB 12.5        BMP:    Recent Labs     04/08/21  1452      K 4.2      CO2 28   BUN 23   CREATININE 0.75   GLUCOSE 102*     S. Calcium:  Recent Labs     04/08/21  1452   CALCIUM 9.8     S. Ionized Calcium:No results for input(s): IONCA in the last 72 hours. S. Magnesium:No results for input(s): MG in the last 72 hours. S. Phosphorus:No results for input(s): PHOS in the last 72 hours. S. Glucose:No results for input(s): POCGLU in the last 72 hours.   Glycosylated hemoglobin A1C:   Lab Results   Component Value Date    LABA1C 5.4 12/14/2020     Hepatic:   Recent Labs     04/08/21  1452   AST 18   ALT 11   ALKPHOS 87     CARDIAC ENZY:   Recent Labs 04/08/21  1452   TROPHS <6     INR: No results for input(s): INR in the last 72 hours. BNP:   Recent Labs     04/08/21  1452   PROBNP 29      ABGs: No results for input(s): PH, PCO2, PO2, HCO3, O2SAT in the last 72 hours. Lipids: No results for input(s): CHOL, TRIG, HDL, LDLCALC in the last 72 hours. Invalid input(s): LDL  Pancreatic functions:No results for input(s): LIPASE, AMYLASE in the last 72 hours. Vevelyn Deer: No results for input(s): LACTA in the last 72 hours. Thyroid functions:   Lab Results   Component Value Date    TSH 2.70 08/20/2019      U/A:No results for input(s): NITRITE, COLORU, WBCUA, RBCUA, MUCUS, BACTERIA, CLARITYU, SPECGRAV, LEUKOCYTESUR, BLOODU, GLUCOSEU, AMORPHOUS in the last 72 hours. Invalid input(s): [facility]    Imaging/Diagonstics:     Xr Knee Left (3 Views)    Result Date: 4/8/2021  EXAMINATION: THREE XRAY VIEWS OF THE LEFT KNEE 4/8/2021 2:49 pm COMPARISON: Left knee radiographs performed 06/03/2008. HISTORY: ORDERING SYSTEM PROVIDED HISTORY: fall TECHNOLOGIST PROVIDED HISTORY: fall Reason for Exam: PT CO Pain in multiple sites of her body including her chest, Left knee, and Left foot after falling yesterday. Acuity: Acute Type of Exam: Initial FINDINGS: There is no acute osseous abnormality. The joint spaces are maintained. There is no joint effusion. The periarticular soft tissues are unremarkable. No acute osseous or soft tissue abnormality. Xr Foot Left (min 3 Views)    Result Date: 4/8/2021  EXAMINATION: THREE XRAY VIEWS OF THE LEFT FOOT 4/8/2021 2:49 pm COMPARISON: None. HISTORY: ORDERING SYSTEM PROVIDED HISTORY: fall TECHNOLOGIST PROVIDED HISTORY: fall Reason for Exam: PT CO Pain in multiple sites of her body including her chest, Left knee, and Left foot after falling yesterday. Acuity: Acute Type of Exam: Initial FINDINGS: There is no acute osseous abnormality. The joint spaces are maintained. There is a calcaneal spur at the plantar fascial attachment. The surrounding soft tissues are unremarkable. No acute osseous or soft tissue abnormality. Ct Head Wo Contrast    Result Date: 4/8/2021  EXAMINATION: CT OF THE HEAD WITHOUT CONTRAST  4/8/2021 2:05 pm TECHNIQUE: CT of the head was performed without the administration of intravenous contrast. Dose modulation, iterative reconstruction, and/or weight based adjustment of the mA/kV was utilized to reduce the radiation dose to as low as reasonably achievable. COMPARISON: 09/28/2019. HISTORY: ORDERING SYSTEM PROVIDED HISTORY: vertigo, fall TECHNOLOGIST PROVIDED HISTORY: vertigo, fall Decision Support Exception->Emergency Medical Condition (MA) Reason for Exam: Dizziness; Fall Acuity: Acute Type of Exam: Initial Mechanism of Injury: pt states became dizzy and fell hitting top of head Relevant Medical/Surgical History: no surgery to area of interest FINDINGS: BRAIN/VENTRICLES: There is no acute intracranial hemorrhage, mass effect or midline shift. No abnormal extra-axial fluid collection. The gray-white differentiation is maintained without evidence of an acute infarct. There is no evidence of hydrocephalus. A 1 cm focus of small vessel infarction or gliosis from prior ischemic event was again noted in the left external capsule. It is unchanged from 09/28/2019. ORBITS: The visualized portion of the orbits demonstrate no acute abnormality. SINUSES: The visualized paranasal sinuses and mastoid air cells demonstrate no acute abnormality. SOFT TISSUES/SKULL:  No acute abnormality of the visualized skull or soft tissues. No acute intracranial abnormality. Old 1 cm focus of small vessel infarction or gliosis from prior ischemic event in the left external capsule. It is unchanged from 09/28/2019.      Xr Chest Portable    Result Date: 4/8/2021  EXAMINATION: ONE XRAY VIEW OF THE CHEST 4/8/2021 2:49 pm COMPARISON: October 16, 2020, chest exam, 2019 HISTORY: ORDERING SYSTEM PROVIDED HISTORY: cough, sob TECHNOLOGIST effusion or pneumothorax. Emphysema with subpleural reticular changes, similar prior examination. There is new superimposed ground-glass and nodular opacity throughout both lungs. Upper Abdomen: Limited images of the upper abdomen demonstrate no acute abnormality. Soft Tissues/Bones: No acute bone or soft tissue abnormality. 1. No pulmonary embolus. 2. Ground-glass opacity within both lungs, concerning for multifocal pneumonia. 3. Emphysema and chronic subpleural reticular changes are similar to prior examination. RECOMMENDATIONS: Imaging features can be seen with viral pneumonia, though are nonspecific and can occur with a variety of infectious and noninfectious processes. PneInd Reference: https://pubs. rsna.org/doi/full/10.1148/ryct. 0476226198     ASSESSMENT  and  PLAN     Principal Problem:    Community acquired pneumonia, bilateral  Active Problems:    Dizziness  Resolved Problems:    * No resolved hospital problems. *    Plan:    Community Acquired Pneumonia   -CT chest-groundglass opacities bilateral  --Concerning for multifocal pneumonia  -IV Rocephin and Zithromax initiated in ED  --continue on admission  -Rapid Covid negative  -Mucinex twice daily  -DuoNeb aerosols every 4 hours  -Albuterol aerosols as needed  -Pulmonology consulted in ED  -Pneumovax before discharge if applicable    Dizziness/lightheadedness  -Denies LOC  -history of Ménière's disease  -Check orthostatic VS q shift  -Continue home dose PRN meclizine  -EKG - NSR - rate 66  -IVF - NS @ 100 ml/hr  -Hold Adipex for now    Fall  -Xrays Lt foot and knee no gerald abnormality  -CT head - no acute intercranial abnormality  -PT & OT eval and treat  -Fall precuations    Consultations:     IP CONSULT TO INTERNAL MEDICINE  IP CONSULT TO PULMONOLOGY      ABIEL Middleton - CNP   4/9/2021  4:14 AM    Axel Ocampo [address] [place] [address].    Phone [phone]

## 2021-04-08 NOTE — ED TRIAGE NOTES
Mode of arrival (squad #, walk in, police, etc) : walk in        Chief complaint(s): Dizziness, Fall        Arrival Note (brief scenario, treatment PTA, etc). : Pt states she was standing at her kitchen sink when she got dizzy and fell down. Pt states she hit her head but did not lose consciousness. Pt states she has been seen by her PCP and given meclizine for the dizziness. Pt is complaining of left knee and foot pain from the fall. Pt denies CP and nausea. C= \"Have you ever felt that you should Cut down on your drinking? \"  No  A= \"Have people Annoyed you by criticizing your drinking? \"  No  G= \"Have you ever felt bad or Guilty about your drinking? \"  No  E= \"Have you ever had a drink as an Eye-opener first thing in the morning to steady your nerves or to help a hangover? \"  No      Deferred []      Reason for deferring: N/A    *If yes to two or more: probable alcohol abuse. *

## 2021-04-08 NOTE — PROGRESS NOTES
Medication History completed:    New medications: Percocet    Medications discontinued: Exalgo    Changes to dosing:   Pregabalin changed to 150 mg twice daily    Stated allergies: As listed    Other pertinent information: Medications confirmed with [facility] and [facility].      Thank you,  Camryn Guzman, PharmD, BCPS  [phone]

## 2021-04-08 NOTE — ED NOTES
Admission Dx: Bilateral Pneumonia    Pts Chief Complaints on Arrival: Dizziness and Fall yesterday with left ankle and knee pain    ADL's - Self-care    Pending Diagnostics:  none    Residence PTA: single story home    Special Considerations/Circumstances:  none    Vitals: Current vital signs:  /69   Pulse 66   Temp 98.1 °F (36.7 °C) (Oral)   Resp 19   Ht 5' 7\" (1.702 m)   Wt 200 lb (90.7 kg)   SpO2 97%   BMI 31.32 kg/m²                MEWS Score: 0            Ajit Rene, RN  04/08/21 5977

## 2021-04-08 NOTE — ED PROVIDER NOTES
[address]     Pt Name: Romeo Delacruz  MRN: [id number]  Burke[place]ffamilia 1955  Date of evaluation: 4/8/21   Romeo Delacruz is a 77 y.o. female with CC: Dizziness and Fall    MDM:   Sudden dizziness with standing, off and on for a long time, hx of meniere's disease, takes meclizine  Rudolfo Charles today and foot was stuck under cabinet  Has a chronic cough 6 weeks  No dyspnea      ED Course as of Apr 08 1730   Thu Apr 08, 2021   1703 Bilateral pneumonia on ct chest  Covid negative  Do not suspect sepsis  Iv abx  Admitting to the hospital  Iv hydration    [WM]   0 DW Dr Niecy Obregon, she rec consulting pulm      [WM]   Griselda Ward, we discussed her CXR, old CXR, today's ct lungs, labs, negative rapid covid today, patient had negative rapid covid test a few weeks ago also, he doesn't think this is covid, and the patient doesn't need covid isolation. He agrees with iv abx    [WM]      ED Course User Index  [WM] Taiwo Tsai MD       EKG: All EKG's are interpreted by the Emergency Department Physician who either signs or Co-signs this chart in the absence of a cardiologist.  Normal ekg    RADIOLOGY:All plain film, CT, MRI, and formal ultrasound images (except ED bedside ultrasound) are read by the radiologist, see reports below, unless otherwise noted in MDM or here. CT CHEST PULMONARY EMBOLISM W CONTRAST   Final Result   1. No pulmonary embolus. 2. Ground-glass opacity within both lungs, concerning for multifocal   pneumonia. 3. Emphysema and chronic subpleural reticular changes are similar to prior   examination. RECOMMENDATIONS:   Imaging features can be seen with viral pneumonia, though are nonspecific and   can occur with a variety of infectious and noninfectious processes. PneInd   Reference: https://pubs. rsna.org/doi/full/10.1148/ryct. 2528803639         CT HEAD WO CONTRAST   Final Result   No acute intracranial abnormality.       Old 1 cm focus of small vessel infarction or gliosis from prior ischemic   event in the left external capsule. It is unchanged from 09/28/2019. XR KNEE LEFT (3 VIEWS)   Final Result   No acute osseous or soft tissue abnormality. XR FOOT LEFT (MIN 3 VIEWS)   Final Result   No acute osseous or soft tissue abnormality. XR CHEST PORTABLE   Final Result   Interval increase in prominence of asymmetric bibasilar interstitial   infiltrates. Findings likely related to mild interval progression in   interstitial fibrosis      No definite acute cardiopulmonary findings           LABS: All lab results were reviewed by myself, and all abnormals are listed below. Labs Reviewed   CBC WITH AUTO DIFFERENTIAL - Abnormal; Notable for the following components:       Result Value    Seg Neutrophils 72 (*)     Lymphocytes 17 (*)     All other components within normal limits   COMPREHENSIVE METABOLIC PANEL W/ REFLEX TO MG FOR LOW K - Abnormal; Notable for the following components:    Glucose 102 (*)     Anion Gap 7 (*)     Total Bilirubin 0.23 (*)     All other components within normal limits   D-DIMER, QUANTITATIVE - Abnormal; Notable for the following components:    D-Dimer, Quant 0.67 (*)     All other components within normal limits   COVID-19, RAPID   TROPONIN   BRAIN NATRIURETIC PEPTIDE   TROPONIN   FERRITIN   C-REACTIVE PROTEIN   PROCALCITONIN     CONSULTS:  IP CONSULT TO INTERNAL MEDICINE  IP CONSULT TO PULMONOLOGY  FINAL IMPRESSION      1. Pneumonia of both lungs due to infectious organism, unspecified part of lung    2.  Dizziness            PASTMEDICAL HISTORY     Past Medical History:   Diagnosis Date    Arthritis     Depression     Displacement of intervertebral disc, site unspecified, without myelopathy     [facility]    Displacement of lumbar intervertebral disc without myelopathy     [facility]    Hyperlipidemia     Lumbago     [facility]    Sprain of lumbar region     [facility]    Sprain of lumbosacral (joint) (ligament)     [facility]    Sprain of sacrum     [facility]     SURGICAL HISTORY       Past Surgical History:   Procedure Laterality Date    ABDOMINAL EXPLORATION SURGERY      BACK SURGERY      X2    COLON SURGERY      COLONOSCOPY      COLONOSCOPY N/A 2020    COLONOSCOPY POLYPECTOMY HOT SNARE performed by Danuta Gamble MD at [address] Right     HAND SURGERY Right 14    OVARY REMOVAL Bilateral     WRIST ARTHROPLASTY Left      CURRENT MEDICATIONS       Previous Medications    ASPIRIN (ASPIRIN CHILDRENS) 81 MG CHEWABLE TABLET    Take 1 tablet by mouth daily    CALCIUM CARBONATE-VITAMIN D (OSCAL 500/200 D-3 PO)    Take 500 mg by mouth daily    FLUOXETINE (PROZAC) 10 MG CAPSULE    Take 1 capsule by mouth 2 times daily    MECLIZINE (ANTIVERT) 25 MG TABLET    Take 1 tablet by mouth twice daily as needed    OXYCODONE-ACETAMINOPHEN (PERCOCET) 5-325 MG PER TABLET    Take 1 tablet by mouth 2 times daily as needed for Pain. PHENTERMINE (ADIPEX-P) 37.5 MG TABLET    Take 1 tablet by mouth every morning (before breakfast) for 30 days. PREGABALIN (LYRICA) 150 MG CAPSULE    Take 150 mg by mouth 2 times daily. TRAZODONE (DESYREL) 100 MG TABLET    1 tab at night    VITAMIN C (ASCORBIC ACID) 500 MG TABLET    Take 1,000 mg by mouth 2 times daily    ZOSTER RECOMBINANT ADJUVANTED VACCINE (SHINGRIX) 50 MCG/0.5ML SUSR INJECTION    Inject 0.5 mLs into the muscle See Admin Instructions 1 dose now and repeat in 2-6 months     ALLERGIES     is allergic to Jose Armando Schein tartrate]; ativan [lorazepam]; darvon [propoxyphene]; restoril [temazepam]; and topamax [topiramate]. FAMILY HISTORY     She indicated that the status of her father is unknown.      SOCIAL HISTORY       Social History     Tobacco Use    Smoking status: Former Smoker     Packs/day: 0.25     Years: 15.00     Pack years: 3.75     Types: Cigarettes     Quit date: 2019     Years since quittin.5    Smokeless tobacco: Never Used  Tobacco comment: quit 37 years ago   Substance Use Topics    Alcohol use: Yes     Alcohol/week: 2.0 standard drinks     Types: 2 Cans of beer per week     Comment: OCCASSION    Drug use: No       I personally evaluated and examined the patient in conjunction with the APC and agree with the assessment, treatment plan, and disposition of the patient as recorded by the APC.    Danielle Couch MD  Attending Emergency Physician          Danielle Couch MD  04/08/21 [id number]

## 2021-04-09 ENCOUNTER — APPOINTMENT (OUTPATIENT)
Dept: CT IMAGING | Age: 66
DRG: 192 | End: 2021-04-09
Payer: MEDICARE

## 2021-04-09 LAB
ANION GAP SERPL CALCULATED.3IONS-SCNC: 7 MMOL/L (ref 9–17)
BUN BLDV-MCNC: 13 MG/DL (ref 8–23)
BUN/CREAT BLD: ABNORMAL (ref 9–20)
CALCIUM SERPL-MCNC: 8.4 MG/DL (ref 8.6–10.4)
CHLORIDE BLD-SCNC: 110 MMOL/L (ref 98–107)
CO2: 24 MMOL/L (ref 20–31)
CREAT SERPL-MCNC: 0.59 MG/DL (ref 0.5–0.9)
EKG ATRIAL RATE: 66 BPM
EKG P AXIS: -2 DEGREES
EKG P-R INTERVAL: 170 MS
EKG Q-T INTERVAL: 402 MS
EKG QRS DURATION: 86 MS
EKG QTC CALCULATION (BAZETT): 421 MS
EKG R AXIS: 26 DEGREES
EKG T AXIS: 10 DEGREES
EKG VENTRICULAR RATE: 66 BPM
GFR AFRICAN AMERICAN: >60 ML/MIN
GFR NON-AFRICAN AMERICAN: >60 ML/MIN
GFR SERPL CREATININE-BSD FRML MDRD: ABNORMAL ML/MIN/{1.73_M2}
GFR SERPL CREATININE-BSD FRML MDRD: ABNORMAL ML/MIN/{1.73_M2}
GLUCOSE BLD-MCNC: 91 MG/DL (ref 70–99)
HCT VFR BLD CALC: 33.8 % (ref 36–46)
HEMOGLOBIN: 11 G/DL (ref 12–16)
MCH RBC QN AUTO: 31.4 PG (ref 26–34)
MCHC RBC AUTO-ENTMCNC: 32.6 G/DL (ref 31–37)
MCV RBC AUTO: 96.1 FL (ref 80–100)
NRBC AUTOMATED: ABNORMAL PER 100 WBC
PDW BLD-RTO: 14.4 % (ref 11.5–14.9)
PLATELET # BLD: 215 K/UL (ref 150–450)
PMV BLD AUTO: 9.1 FL (ref 6–12)
POTASSIUM SERPL-SCNC: 4.5 MMOL/L (ref 3.7–5.3)
RBC # BLD: 3.52 M/UL (ref 4–5.2)
SODIUM BLD-SCNC: 141 MMOL/L (ref 135–144)
TROPONIN INTERP: NORMAL
TROPONIN T: NORMAL NG/ML
TROPONIN, HIGH SENSITIVITY: <6 NG/L (ref 0–14)
WBC # BLD: 5.6 K/UL (ref 3.5–11)

## 2021-04-09 PROCEDURE — 97162 PT EVAL MOD COMPLEX 30 MIN: CPT

## 2021-04-09 PROCEDURE — 84484 ASSAY OF TROPONIN QUANT: CPT

## 2021-04-09 PROCEDURE — 6370000000 HC RX 637 (ALT 250 FOR IP): Performed by: NURSE PRACTITIONER

## 2021-04-09 PROCEDURE — 96366 THER/PROPH/DIAG IV INF ADDON: CPT

## 2021-04-09 PROCEDURE — 6360000002 HC RX W HCPCS: Performed by: NURSE PRACTITIONER

## 2021-04-09 PROCEDURE — 93010 ELECTROCARDIOGRAM REPORT: CPT | Performed by: INTERNAL MEDICINE

## 2021-04-09 PROCEDURE — 94640 AIRWAY INHALATION TREATMENT: CPT

## 2021-04-09 PROCEDURE — G0378 HOSPITAL OBSERVATION PER HR: HCPCS

## 2021-04-09 PROCEDURE — 97116 GAIT TRAINING THERAPY: CPT

## 2021-04-09 PROCEDURE — 2060000000 HC ICU INTERMEDIATE R&B

## 2021-04-09 PROCEDURE — 71250 CT THORAX DX C-: CPT

## 2021-04-09 PROCEDURE — 94761 N-INVAS EAR/PLS OXIMETRY MLT: CPT

## 2021-04-09 PROCEDURE — 99223 1ST HOSP IP/OBS HIGH 75: CPT | Performed by: INTERNAL MEDICINE

## 2021-04-09 PROCEDURE — 6360000002 HC RX W HCPCS: Performed by: INTERNAL MEDICINE

## 2021-04-09 PROCEDURE — 80048 BASIC METABOLIC PNL TOTAL CA: CPT

## 2021-04-09 PROCEDURE — 96372 THER/PROPH/DIAG INJ SC/IM: CPT

## 2021-04-09 PROCEDURE — 85027 COMPLETE CBC AUTOMATED: CPT

## 2021-04-09 PROCEDURE — 2580000003 HC RX 258: Performed by: NURSE PRACTITIONER

## 2021-04-09 PROCEDURE — 2580000003 HC RX 258: Performed by: EMERGENCY MEDICINE

## 2021-04-09 PROCEDURE — 2580000003 HC RX 258: Performed by: INTERNAL MEDICINE

## 2021-04-09 PROCEDURE — 6370000000 HC RX 637 (ALT 250 FOR IP): Performed by: INTERNAL MEDICINE

## 2021-04-09 PROCEDURE — 36415 COLL VENOUS BLD VENIPUNCTURE: CPT

## 2021-04-09 RX ORDER — MECLIZINE HYDROCHLORIDE 25 MG/1
25 TABLET ORAL 2 TIMES DAILY PRN
Status: DISCONTINUED | OUTPATIENT
Start: 2021-04-09 | End: 2021-04-11 | Stop reason: HOSPADM

## 2021-04-09 RX ORDER — FLUOXETINE 10 MG/1
10 CAPSULE ORAL 2 TIMES DAILY
Status: DISCONTINUED | OUTPATIENT
Start: 2021-04-09 | End: 2021-04-11 | Stop reason: HOSPADM

## 2021-04-09 RX ORDER — TRAZODONE HYDROCHLORIDE 100 MG/1
100 TABLET ORAL NIGHTLY
Status: DISCONTINUED | OUTPATIENT
Start: 2021-04-09 | End: 2021-04-11 | Stop reason: HOSPADM

## 2021-04-09 RX ORDER — ASCORBIC ACID 500 MG
1000 TABLET ORAL 2 TIMES DAILY
Status: DISCONTINUED | OUTPATIENT
Start: 2021-04-09 | End: 2021-04-11 | Stop reason: HOSPADM

## 2021-04-09 RX ORDER — CALCIUM CARBONATE/VITAMIN D3 250-3.125
500 TABLET ORAL DAILY
Status: DISCONTINUED | OUTPATIENT
Start: 2021-04-09 | End: 2021-04-11 | Stop reason: HOSPADM

## 2021-04-09 RX ORDER — LEVALBUTEROL 1.25 MG/.5ML
1.25 SOLUTION, CONCENTRATE RESPIRATORY (INHALATION) 4 TIMES DAILY
Status: DISCONTINUED | OUTPATIENT
Start: 2021-04-09 | End: 2021-04-10

## 2021-04-09 RX ORDER — PREGABALIN 150 MG/1
150 CAPSULE ORAL 2 TIMES DAILY
Status: DISCONTINUED | OUTPATIENT
Start: 2021-04-09 | End: 2021-04-11 | Stop reason: HOSPADM

## 2021-04-09 RX ORDER — ASPIRIN 81 MG/1
81 TABLET, CHEWABLE ORAL DAILY
Status: DISCONTINUED | OUTPATIENT
Start: 2021-04-09 | End: 2021-04-11 | Stop reason: HOSPADM

## 2021-04-09 RX ORDER — OXYCODONE HYDROCHLORIDE AND ACETAMINOPHEN 5; 325 MG/1; MG/1
1 TABLET ORAL 2 TIMES DAILY PRN
Status: DISCONTINUED | OUTPATIENT
Start: 2021-04-09 | End: 2021-04-11 | Stop reason: HOSPADM

## 2021-04-09 RX ADMIN — GUAIFENESIN 600 MG: 600 TABLET, EXTENDED RELEASE ORAL at 20:52

## 2021-04-09 RX ADMIN — PREGABALIN 150 MG: 150 CAPSULE ORAL at 00:41

## 2021-04-09 RX ADMIN — OXYCODONE HYDROCHLORIDE AND ACETAMINOPHEN 1 TABLET: 5; 325 TABLET ORAL at 10:07

## 2021-04-09 RX ADMIN — IPRATROPIUM BROMIDE 0.5 MG: 0.5 SOLUTION RESPIRATORY (INHALATION) at 19:03

## 2021-04-09 RX ADMIN — TRAZODONE HYDROCHLORIDE 100 MG: 100 TABLET ORAL at 00:41

## 2021-04-09 RX ADMIN — PREGABALIN 150 MG: 150 CAPSULE ORAL at 20:52

## 2021-04-09 RX ADMIN — LEVALBUTEROL 1.25 MG: 1.25 SOLUTION, CONCENTRATE RESPIRATORY (INHALATION) at 16:01

## 2021-04-09 RX ADMIN — SODIUM CHLORIDE: 9 INJECTION, SOLUTION INTRAVENOUS at 02:55

## 2021-04-09 RX ADMIN — AZITHROMYCIN MONOHYDRATE 500 MG: 250 TABLET ORAL at 18:29

## 2021-04-09 RX ADMIN — Medication 500 MG: at 09:35

## 2021-04-09 RX ADMIN — LEVALBUTEROL 1.25 MG: 1.25 SOLUTION, CONCENTRATE RESPIRATORY (INHALATION) at 19:05

## 2021-04-09 RX ADMIN — FLUOXETINE 10 MG: 10 CAPSULE ORAL at 09:36

## 2021-04-09 RX ADMIN — OXYCODONE HYDROCHLORIDE AND ACETAMINOPHEN 1000 MG: 500 TABLET ORAL at 20:52

## 2021-04-09 RX ADMIN — PREGABALIN 150 MG: 150 CAPSULE ORAL at 09:30

## 2021-04-09 RX ADMIN — TRAZODONE HYDROCHLORIDE 100 MG: 100 TABLET ORAL at 20:52

## 2021-04-09 RX ADMIN — GUAIFENESIN 600 MG: 600 TABLET, EXTENDED RELEASE ORAL at 09:35

## 2021-04-09 RX ADMIN — FLUOXETINE 10 MG: 10 CAPSULE ORAL at 02:10

## 2021-04-09 RX ADMIN — ENOXAPARIN SODIUM 40 MG: 40 INJECTION SUBCUTANEOUS at 09:30

## 2021-04-09 RX ADMIN — ASPIRIN 81 MG: 81 TABLET, CHEWABLE ORAL at 09:35

## 2021-04-09 RX ADMIN — SODIUM CHLORIDE: 9 INJECTION, SOLUTION INTRAVENOUS at 23:07

## 2021-04-09 RX ADMIN — OXYCODONE HYDROCHLORIDE AND ACETAMINOPHEN 1 TABLET: 5; 325 TABLET ORAL at 00:41

## 2021-04-09 RX ADMIN — OXYCODONE HYDROCHLORIDE AND ACETAMINOPHEN 1000 MG: 500 TABLET ORAL at 00:41

## 2021-04-09 RX ADMIN — ACETAMINOPHEN 650 MG: 325 TABLET ORAL at 02:14

## 2021-04-09 RX ADMIN — CEFTRIAXONE SODIUM 1000 MG: 1 INJECTION, POWDER, FOR SOLUTION INTRAMUSCULAR; INTRAVENOUS at 18:29

## 2021-04-09 RX ADMIN — IPRATROPIUM BROMIDE 0.5 MG: 0.5 SOLUTION RESPIRATORY (INHALATION) at 16:01

## 2021-04-09 RX ADMIN — IPRATROPIUM BROMIDE AND ALBUTEROL SULFATE 1 AMPULE: .5; 3 SOLUTION RESPIRATORY (INHALATION) at 07:25

## 2021-04-09 RX ADMIN — OXYCODONE HYDROCHLORIDE AND ACETAMINOPHEN 1000 MG: 500 TABLET ORAL at 09:35

## 2021-04-09 RX ADMIN — OXYCODONE HYDROCHLORIDE AND ACETAMINOPHEN 1 TABLET: 5; 325 TABLET ORAL at 20:58

## 2021-04-09 RX ADMIN — FLUOXETINE 10 MG: 10 CAPSULE ORAL at 20:52

## 2021-04-09 RX ADMIN — IPRATROPIUM BROMIDE AND ALBUTEROL SULFATE 1 AMPULE: .5; 3 SOLUTION RESPIRATORY (INHALATION) at 11:18

## 2021-04-09 ASSESSMENT — PAIN DESCRIPTION - ONSET
ONSET: ON-GOING
ONSET: ON-GOING

## 2021-04-09 ASSESSMENT — PAIN DESCRIPTION - LOCATION
LOCATION: FOOT
LOCATION: FOOT

## 2021-04-09 ASSESSMENT — PAIN DESCRIPTION - PROGRESSION
CLINICAL_PROGRESSION: RAPIDLY WORSENING

## 2021-04-09 ASSESSMENT — PAIN SCALES - GENERAL
PAINLEVEL_OUTOF10: 8
PAINLEVEL_OUTOF10: 8
PAINLEVEL_OUTOF10: 7

## 2021-04-09 ASSESSMENT — PAIN DESCRIPTION - ORIENTATION
ORIENTATION: LEFT
ORIENTATION: LEFT

## 2021-04-09 ASSESSMENT — PAIN DESCRIPTION - DESCRIPTORS: DESCRIPTORS: STABBING

## 2021-04-09 ASSESSMENT — PAIN DESCRIPTION - FREQUENCY: FREQUENCY: CONTINUOUS

## 2021-04-09 NOTE — CONSULTS
[facility] PULMON[facility] & CRIT[facility] CARE SPECIALISTS   CONSULT NOTE:      DATE OF CONSULT 4/9/2021    REASON FOR CONSULTATION:     Pneumonia      PCP Austin Garza MD     CHIEF COMPLAINT: cough, shortness of breath    HISTORY OF PRESENT ILLNESS:     Ms. Marina Lora is a pleasant 73yo female with past medical history significant for COPD, Meniere's disease, arthritis, hyperlipidemia, and GERD (per oral history). Yesterday, she became dizzy and lightheaded before falling to the floor in her kitchen. She hit her head and left knee on the way down and her left foot became entangled underneath a cabinet. Of note, Ms. Marian Lora has had a cough and shortness of breath since February. She went to her PCP and was given antibiotics. Her symptoms have still persisted. She also reports that she had an episode of chest pain across her lower anterior chest about 3 weeks ago. She took some nitroglycerin and the pain resolved. ALLERGIES:  Allergies   Allergen Reactions    Ambien [Zolpidem Tartrate] Shortness Of Breath    Ativan [Lorazepam] Shortness Of Breath    Darvon [Propoxyphene] Other (See Comments)     Mental changes    Restoril [Temazepam]     Topamax [Topiramate]        HOME MEDICATIONS:  Medications Prior to Admission: oxyCODONE-acetaminophen (PERCOCET) 5-325 MG per tablet, Take 1 tablet by mouth 2 times daily as needed for Pain. FLUoxetine (PROZAC) 10 MG capsule, Take 1 capsule by mouth 2 times daily  phentermine (ADIPEX-P) 37.5 MG tablet, Take 1 tablet by mouth every morning (before breakfast) for 30 days. meclizine (ANTIVERT) 25 MG tablet, Take 1 tablet by mouth twice daily as needed  traZODone (DESYREL) 100 MG tablet, 1 tab at night  pregabalin (LYRICA) 150 MG capsule, Take 150 mg by mouth 2 times daily.    vitamin C (ASCORBIC ACID) 500 MG tablet, Take 1,000 mg by mouth 2 times daily  Calcium Carbonate-Vitamin D (OSCAL 500/200 D-3 PO), Take 500 mg by mouth daily  aspirin (ASPIRIN CHILDRENS) 81 MG chewable tablet, Take 1 tablet by mouth daily  zoster recombinant adjuvanted vaccine [facility]) 50 MCG/0.5ML SUSR injection, Inject 0.5 mLs into the muscle See Admin Instructions 1 dose now and repeat in 2-6 months      PAST MEDICAL HISTORY:  Past Medical History:   Diagnosis Date    Arthritis     Depression     Displacement of intervertebral disc, site unspecified, without myelopathy     BWC    Displacement of lumbar intervertebral disc without myelopathy     bwc    Hyperlipidemia     Lumbago     Bwc    Sprain of lumbar region     bwc    Sprain of lumbosacral (joint) (ligament)     BWC    Sprain of sacrum     BWC       PAST SURGICAL HISTORY:  Past Surgical History:   Procedure Laterality Date    ABDOMINAL EXPLORATION SURGERY      BACK SURGERY      X2    COLON SURGERY      COLONOSCOPY      COLONOSCOPY N/A 2020    COLONOSCOPY POLYPECTOMY HOT SNARE performed by Brandyn Alvarado MD at [address] Right     HAND SURGERY Right 14    OVARY REMOVAL Bilateral     WRIST ARTHROPLASTY Left           SOCIAL HISTORY:  Social History     Socioeconomic History    Marital status:      Spouse name: Not on file    Number of children: Not on file    Years of education: Not on file    Highest education level: Not on file   Occupational History     Employer: Not Employed   Social Needs    Financial resource strain: Not on file    Food insecurity     Worry: Not on file     Inability: Not on file    Transportation needs     Medical: Not on file     Non-medical: Not on file   Tobacco Use    Smoking status: Former Smoker     Packs/day: 0.25     Years: 15.00     Pack years: 3.75     Types: Cigarettes     Quit date: 2019     Years since quittin.5    Smokeless tobacco: Never Used    Tobacco comment: quit 37 years ago   Substance and Sexual Activity    Alcohol use:  Yes     Alcohol/week: 2.0 standard drinks     Types: 2 Cans of beer per week Comment: OCCASSION    Drug use: No    Sexual activity: Yes     Partners: Male   Lifestyle    Physical activity     Days per week: Not on file     Minutes per session: Not on file    Stress: Not on file   Relationships    Social connections     Talks on phone: Not on file     Gets together: Not on file     Attends [employer]ism service: Not on file     Active member of club or organization: Not on file     Attends meetings of clubs or organizations: Not on file     Relationship status: Not on file    Intimate partner violence     Fear of current or ex partner: Not on file     Emotionally abused: Not on file     Physically abused: Not on file     Forced sexual activity: Not on file   Other Topics Concern    Not on file   Social History Narrative    Not on file       FAMILY HISTORY:  Family History   Problem Relation Age of Onset    Heart Disease Father     High Blood Pressure Father        REVIEW OF SYSTEMS:  All other systems reviewed and are negative. PHYSICAL EXAM:  Vital Signs Blood pressure (!) 105/42, pulse 75, temperature 97.7 °F (36.5 °C), temperature source Oral, resp. rate 16, height 5' 7\" (1.702 m), weight 199 lb 8.3 oz (90.5 kg), SpO2 96 %. Oxygen Amount and Delivery:      Admission Weight Weight: 200 lb (90.7 kg)    General Appearance     Head  Normocephalic, without obvious abnormality, atraumatic  Lungs  Inspiratory crackles noted in base of lungs bilaterally. No increased effort of breathing noted. Heart: regular rate and rhythm, S1, S2 normal, no murmur, click, rub or gallop  Abdomen  soft, non-tender; bowel sounds normal; no masses,  no organomegaly  Skin  Skin color, texture, turgor normal. No rashes or lesions  Neurologic: Alert and oriented X 3, normal strength and tone. Imaging  CT Angiogram for Pulmonary Embolism:  1. No pulmonary embolus. 2. Ground-glass opacity within both lungs, concerning for multifocal   pneumonia.    3. Emphysema and chronic subpleural reticular changes PHART, PO2ART, [id number]    No results found for: IFIO2, MODE, SETTIDVOL, SETPEEP      Impression/Plan:    1. Suspected community-acquired pneumonia  -pt is on Rocephin and Azithromyin  -pt is taking Duoneb    2. Dizziness/lightheadedness  -history of Meniere's  - internal medicine team checking orthostats    3. Fall precaution    Patient seen and examined independently by me.  Above discussed  with the student     See my dictated note   Electronically signed by Ritchie Koenig MD on 4/10/2021 at 7:44 AM

## 2021-04-09 NOTE — CARE COORDINATION
CASE MANAGEMENT NOTE:    Admission Date:  4/8/2021 Claribel Bacon is a 77 y.o.  female    Admitted for : Community acquired pneumonia, bilateral [J18.9]    Met with:  Patient    PCP:  Dr. Prisca Rios:  [facility] Medicare      Is patient is a : No    Is patient alert and oriented at time of discussion:  Yes    Current Residence/ Living Arrangements:  independently at home             Current Services PTA:  No    Does patient go to outpatient dialysis: No  If yes, location and chair time: N/A    Is patient agreeable to VNS: No    Freedom of choice provided:  Yes    List of [address] provided: No    VNS chosen:  No    DME:  straight cane and walker    Home Oxygen: No    Nebulizer: No    CPAP/BIPAP: No    Supplier: N/A    Potential Assistance Needed: No    SNF needed: No    Freedom of choice and list provided: NA    Pharmacy:  [facility]       Does Patient want to use MEDS to BEDS? No    Is patient currently receiving oral anticoagulation therapy? No    Is the Patient an [facility] with Readmission Risk Score greater than 14%? Yes  If yes, pt needs a follow up appointment made within 7 days. Family Members/Caregivers that pt would like involved in their care:    Yes    If yes, list name here:   Olivier Lawrence    Transportation Provider:  Patient             Discharge Plan:  4/9: [address] [facility] home with spouse - 1st has couch and 1/2 bath where she will stay when she discharges. DME - Cane, walker. Declines need for VNS. On IV zithromax, IV rocephin, PT/OT, pulm consult.  [facility] - F/U appt 4/15 @ 2:30 PM with Dr. Emiliano Houston. Algaeon Júnior                 Electronically signed by: Trey Valencia RN on 4/9/2021 at 4:36 PM

## 2021-04-09 NOTE — PROGRESS NOTES
Bronchodilator Assessment     RR 18  Breath Sounds: clear, diminished  SPO2: 100  FiO2: 21      Bronchodilator assessment at level  3    []    Bronchodilator Assessment  BRONCHODILATOR ASSESSMENT SCORE  Score 0 1 2 3 4 5   Breath Sounds   []  Patient Baseline []  No Wheeze good aeration []  Faint, scattered wheezing, good aeration [x]  Expiratory Wheezing and or moderately diminished []  Insp/Exp wheeze and/or very diminished []  Insp/Exp and/ or marked distress   Respiratory Rate   []  Patient Baseline []  Less than 20 [x]  Less than 20 []  20-25 []  Greater than 25 []  Greater than 25   Peak flow % of Pred or PB [x]  NA   []  Greater than 90%  []  81-90% []  71-80% []  Less than or equal to 70%  or unable to perform []  Unable due to Respiratory Distress   Dyspnea re []  Patient Baseline []  No SOB []  No SOB [x]  SOB on exertion []  SOB min activity []  At rest/acute   e FEV% Predicted       [x]  NA []  Above 69%  []  Unable []  Above 60-69%  []  Unable []  Above 50-59%  []  Unable []  Above 35-49%  []  Unable []  Less than 35%  []  Unable          ADELA Santillan      3:53 PM

## 2021-04-09 NOTE — PROGRESS NOTES
Physical Therapy    Facility/Department: [facility] PROGRESSIVE CARE  Initial Assessment    NAME: Lina Garcia  : 1955  MRN: [id number]    Date of Service: 2021    Discharge Recommendations:  Patient would benefit from continued therapy after discharge, Continue to assess pending progress   PT Equipment Recommendations  Equipment Needed: Yes  Mobility Devices: Caretha Lick: Rolling    Assessment   Body structures, Functions, Activity limitations: Decreased functional mobility ; Decreased strength;Decreased endurance;Decreased balance; Increased pain  Assessment: continue per POC to maxmize potential for safe D/C  Treatment Diagnosis: impaired mobility due to injury left foot and pain level  Specific instructions for Next Treatment: advance gait distance as tolerated, advance to steps and instruct in HEP  Prognosis: Good  Decision Making: Medium Complexity  History: admitted due to syncopal episode at home  Exam: ROM, MMT, balance and mobility assessments  Clinical Presentation: gait w/ wheeled walker 15' w/ CGA x 1- tends to WB on left heel- FALL RISK- hx syncope, CGA x 1 sit <> stand and SBA for bed mobility  PT Education: Goals;PT Role;Plan of Care  Barriers to Learning: pain level  REQUIRES PT FOLLOW UP: Yes  Activity Tolerance  Activity Tolerance: Patient limited by fatigue;Patient limited by pain; Patient limited by endurance       Patient Diagnosis(es): The primary encounter diagnosis was Pneumonia of both lungs due to infectious organism, unspecified part of lung. A diagnosis of Dizziness was also pertinent to this visit. has a past medical history of Arthritis, Depression, Displacement of intervertebral disc, site unspecified, without myelopathy, Displacement of lumbar intervertebral disc without myelopathy, Hyperlipidemia, Lumbago, Sprain of lumbar region, Sprain of lumbosacral (joint) (ligament), and Sprain of sacrum. has a past surgical history that includes Ovary removal (Bilateral);  Wrist Arthroplasty (Left); Dilation and curettage of uterus; Abdominal exploration surgery; Colon surgery; back surgery; Colonoscopy; Foot surgery (Right); Hand surgery (Right, 6/18/14); and Colonoscopy (N/A, 11/30/2020). Restrictions  Restrictions/Precautions  Restrictions/Precautions: Fall Risk(peripheral IV right antecubital)  Required Braces or Orthoses?: No  Implants present? : Metal implants(left wrist arthroplasty)  Position Activity Restriction  Other position/activity restrictions: up w/ assist  Vision/Hearing  Vision: Impaired  Vision Exceptions: Wears glasses at all times  Hearing: Exceptions to [facility]  Hearing Exceptions: Hard of hearing/hearing concerns; No hearing aid     Subjective  General  Chart Reviewed: Yes  Patient assessed for rehabilitation services?: Yes  Response To Previous Treatment: Not applicable  Family / Caregiver Present: No  Referring Practitioner: Holli Thomas CNP  Referral Date : 04/08/21  Diagnosis: pneumonia, dizziness  Follows Commands: Within Functional Limits  Other (Comment): OK per nurse Zen Colón to proceed w/ PT evaluation  General Comment  Comments: X-rays of the left knee and left foot are unremarkable. CT scan of the head shows no acute intracranial abnormality; old 1 cm focus of small vessel infarction or gliosis from prior event left internal capsule. Subjective  Subjective: pt reports that she became dizzy and fell in her kitchin while standing next to the sink. Pt reports that her left foot became trapped under the cupboard when she fell. Pt reports that she was also diagnosed w/ pneumonia. Pt reports hx 2 falls in the last month.   Pain Screening  Patient Currently in Pain: Yes  Pain Assessment  Pain Assessment: 0-10  Pain Level: 8(1/ 10 at rest, 8/10 w/ movement and weightbearing)  Patient's Stated Pain Goal: No pain  Pain Type: Acute pain  Pain Location: Foot(mainly ball of the left foot)  Pain Orientation: Left  Pain Descriptors: Stabbing  Pain Frequency: Continuous  Pain Onset: On-going  Clinical Progression: Rapidly worsening  Non-Pharmaceutical Pain Intervention(s): Ambulation/Increased Activity;Repositioned  Response to Pain Intervention: Patient Satisfied  Multiple Pain Sites: No  Vital Signs  Patient Currently in Pain: Yes       Orientation  Orientation  Overall Orientation Status: Within Functional Limits(answered all questions correctly)  Social/Functional History  Social/Functional History  Lives With: Spouse  Type of Home: House  Home Layout: Two level, Bed/Bath upstairs, 1/2 bath on main level  Home Access: Stairs to enter with rails  Entrance Stairs - Number of Steps: 5 steps w/ 2 rails; 16 steps / left rail to ascend to 2nd floor bed & bath  Entrance Stairs - Rails: Both  Bathroom Shower/Tub: Tub/Shower unit, Curtain  Bathroom Toilet: Handicap height(standard first floor  and handicapped on 2nd floor; has cupboard next to it and window sill on other side upstairs)  Bathroom Equipment: Grab bars in shower, Hand-held shower  Home Equipment: Standard walker, Cane, Crutches, Long-handled shoehorn  ADL Assistance: Independent  Homemaking Assistance: Needs assistance(spouse does the majority of grocaery shopping and cooking, share cleaning and laundry)  Homemaking Responsibilities: Yes(spouse does the majority of grocaery shopping and cooking, share cleaning and laundry)  Ambulation Assistance: Independent(no device, hx 2 falls in the last month)  Transfer Assistance: Independent  Active : Yes  Mode of Transportation: Car, Truck  Occupation: Retired, On disability  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  [address]: 2 dogs  IADL Comments: sleep in an  adjustable bed that also vibrates  Additional Comments: rebeka is retired and able to assist at [employer]        Objective     Observation/Palpation  Observation: peripheral IV right antecubital    AROM RLE (degrees)  RLE AROM: WFL  AROM LLE (degrees)  LLE AROM : WFL  LLE General AROM: except left ankle 0-5 degrees dorsiflexion due to pain  AROM RUE (degrees)  RUE General AROM: see OT for UE assessment  AROM LUE (degrees)  LUE General AROM: see OT for UE assessment  Strength RLE  Comment: grossly 4+/5  Strength LLE  Comment: grossly 4+/5 except left ankle NT due to pain level (therapist deferred resistance- appears at least 3/5)  Strength RUE  Comment: see OT for UE assessment  Strength LUE  Comment: see OT for UE assessment     Sensation  Overall Sensation Status: WFL(denies)  Bed mobility  Rolling to Right: Stand by assistance  Supine to Sit: Stand by assistance  Scooting: Stand by assistance  Comment: dangled at the EOB w/ CGA x 1  Transfers  Sit to Stand: Contact guard assistance  Stand to sit: Contact guard assistance  Stand Pivot Transfers: Contact guard assistance(used wheeled walker)  Ambulation  Ambulation?: Yes  Ambulation 1  Surface: level tile  Device: Rolling Walker  Assistance: Contact guard assistance  Quality of Gait: pt is heel WB on the left foot due to pain level- state sshe is unable to bear weight over the ball of the foot  Gait Deviations: Slow Dunia  Distance: 15' around the bed  Stairs/Curb  Stairs?: No     Balance  Sitting - Static: Good  Sitting - Dynamic: Good  Standing - Static: Good;-(used wheeled walker)  Standing - Dynamic: Fair;+(used wheeled walker)        Plan   Plan  Times per week: 3-5  treatments/ 5 days  Times per day: (3-5  treatments/ 5 days)  Specific instructions for Next Treatment: advance gait distance as tolerated, advance to steps and instruct in HEP  Current Treatment Recommendations: Strengthening, Functional Mobility Training, Home Exercise Program, Equipment Evaluation, Education, & procurement, Safety Education & Training, Gait Training, Transfer Training, Balance Training, Endurance Training, Stair training, Patient/Caregiver Education & Training  Safety Devices  Type of devices:  All fall risk precautions in place, Left in chair, Gait belt, Call light within reach, Patient at risk for falls, Chair alarm in place, Nurse notified(nurse Araceli)    G-Code       OutComes Score                                                  AM-PAC Score  AM-PAC Inpatient Mobility Raw Score : 16 (04/09/21 0835)  AM-PAC Inpatient T-Scale Score : 40.78 (04/09/21 0835)  Mobility Inpatient CMS 0-100% Score: 54.16 (04/09/21 0835)  Mobility Inpatient CMS G-Code Modifier : CK (04/09/21 0835)          Goals  Short term goals  Time Frame for Short term goals: 3-5  treatments/ 5 days  Short term goal 1: pt to tolerate 1/2 hour of therapuetic exercise and activity  Short term goal 2: pt to demonstrate good technique for ROM & strengthening exercises bilateral LEs  Short term goal 3: pt to demonstrate MODIFIED independent bed mobility for position change  Short term goal 4: pt to demonstrate MODIFIED independent transfers using least restrictive device  Short term goal 5: pt to demonstrate MODIFIED independent gait 50-80' using least restrictive device for household distances  Short term goal 6: pt to demonstrate good balance using least restrictive device  Short term goal 7: pt to demonstrate ability to ascend/ descend 5 steps w/ rail and using least restrictive device w/ CGA x 1 for home entry  Patient Goals   Patient goals : return home w/ spouse       Therapy Time   Individual Concurrent Group Co-treatment   Time In 0835         Time Out 0914         Minutes 39         Timed Code Treatment Minutes: Kris Conde, PT

## 2021-04-10 PROBLEM — J47.1 ACUTE EXACERBATION OF BRONCHIECTASIS (HCC): Status: ACTIVE | Noted: 2021-04-10

## 2021-04-10 PROBLEM — J84.112 UIP (USUAL INTERSTITIAL PNEUMONITIS) (HCC): Chronic | Status: ACTIVE | Noted: 2021-04-10

## 2021-04-10 LAB
-: NORMAL
ANION GAP SERPL CALCULATED.3IONS-SCNC: 9 MMOL/L (ref 9–17)
BUN BLDV-MCNC: 8 MG/DL (ref 8–23)
BUN/CREAT BLD: ABNORMAL (ref 9–20)
CALCIUM SERPL-MCNC: 8.7 MG/DL (ref 8.6–10.4)
CHLORIDE BLD-SCNC: 105 MMOL/L (ref 98–107)
CO2: 24 MMOL/L (ref 20–31)
CREAT SERPL-MCNC: 0.62 MG/DL (ref 0.5–0.9)
GFR AFRICAN AMERICAN: >60 ML/MIN
GFR NON-AFRICAN AMERICAN: >60 ML/MIN
GFR SERPL CREATININE-BSD FRML MDRD: ABNORMAL ML/MIN/{1.73_M2}
GFR SERPL CREATININE-BSD FRML MDRD: ABNORMAL ML/MIN/{1.73_M2}
GLUCOSE BLD-MCNC: 128 MG/DL (ref 70–99)
HCT VFR BLD CALC: 34 % (ref 36–46)
HEMOGLOBIN: 11.4 G/DL (ref 12–16)
MCH RBC QN AUTO: 31.6 PG (ref 26–34)
MCHC RBC AUTO-ENTMCNC: 33.5 G/DL (ref 31–37)
MCV RBC AUTO: 94.2 FL (ref 80–100)
NRBC AUTOMATED: ABNORMAL PER 100 WBC
PDW BLD-RTO: 13.9 % (ref 11.5–14.9)
PLATELET # BLD: 232 K/UL (ref 150–450)
PMV BLD AUTO: 8.2 FL (ref 6–12)
POTASSIUM SERPL-SCNC: 4.1 MMOL/L (ref 3.7–5.3)
RBC # BLD: 3.6 M/UL (ref 4–5.2)
REASON FOR REJECTION: NORMAL
SODIUM BLD-SCNC: 138 MMOL/L (ref 135–144)
WBC # BLD: 11 K/UL (ref 3.5–11)
ZZ NTE CLEAN UP: ORDERED TEST: NORMAL
ZZ NTE WITH NAME CLEAN UP: SPECIMEN SOURCE: NORMAL

## 2021-04-10 PROCEDURE — 6370000000 HC RX 637 (ALT 250 FOR IP): Performed by: NURSE PRACTITIONER

## 2021-04-10 PROCEDURE — 36415 COLL VENOUS BLD VENIPUNCTURE: CPT

## 2021-04-10 PROCEDURE — 2580000003 HC RX 258: Performed by: NURSE PRACTITIONER

## 2021-04-10 PROCEDURE — 6360000002 HC RX W HCPCS: Performed by: NURSE PRACTITIONER

## 2021-04-10 PROCEDURE — 6360000002 HC RX W HCPCS: Performed by: INTERNAL MEDICINE

## 2021-04-10 PROCEDURE — 6370000000 HC RX 637 (ALT 250 FOR IP): Performed by: INTERNAL MEDICINE

## 2021-04-10 PROCEDURE — 2580000003 HC RX 258: Performed by: EMERGENCY MEDICINE

## 2021-04-10 PROCEDURE — 94640 AIRWAY INHALATION TREATMENT: CPT

## 2021-04-10 PROCEDURE — 96372 THER/PROPH/DIAG INJ SC/IM: CPT

## 2021-04-10 PROCEDURE — 2580000003 HC RX 258: Performed by: INTERNAL MEDICINE

## 2021-04-10 PROCEDURE — 94669 MECHANICAL CHEST WALL OSCILL: CPT

## 2021-04-10 PROCEDURE — 85027 COMPLETE CBC AUTOMATED: CPT

## 2021-04-10 PROCEDURE — G0378 HOSPITAL OBSERVATION PER HR: HCPCS

## 2021-04-10 PROCEDURE — 99232 SBSQ HOSP IP/OBS MODERATE 35: CPT | Performed by: INTERNAL MEDICINE

## 2021-04-10 PROCEDURE — 96366 THER/PROPH/DIAG IV INF ADDON: CPT

## 2021-04-10 PROCEDURE — 80048 BASIC METABOLIC PNL TOTAL CA: CPT

## 2021-04-10 PROCEDURE — 2060000000 HC ICU INTERMEDIATE R&B

## 2021-04-10 PROCEDURE — 94761 N-INVAS EAR/PLS OXIMETRY MLT: CPT

## 2021-04-10 RX ORDER — SODIUM CHLORIDE FOR INHALATION 0.9 %
3 VIAL, NEBULIZER (ML) INHALATION EVERY 8 HOURS PRN
Status: DISCONTINUED | OUTPATIENT
Start: 2021-04-10 | End: 2021-04-11 | Stop reason: HOSPADM

## 2021-04-10 RX ORDER — LEVALBUTEROL 1.25 MG/.5ML
1.25 SOLUTION, CONCENTRATE RESPIRATORY (INHALATION) EVERY 4 HOURS PRN
Status: DISCONTINUED | OUTPATIENT
Start: 2021-04-10 | End: 2021-04-11 | Stop reason: HOSPADM

## 2021-04-10 RX ORDER — LEVALBUTEROL 1.25 MG/.5ML
1.25 SOLUTION, CONCENTRATE RESPIRATORY (INHALATION) EVERY 8 HOURS PRN
Status: DISCONTINUED | OUTPATIENT
Start: 2021-04-10 | End: 2021-04-11 | Stop reason: HOSPADM

## 2021-04-10 RX ADMIN — ENOXAPARIN SODIUM 40 MG: 40 INJECTION SUBCUTANEOUS at 10:12

## 2021-04-10 RX ADMIN — ASPIRIN 81 MG: 81 TABLET, CHEWABLE ORAL at 10:10

## 2021-04-10 RX ADMIN — CEFTRIAXONE SODIUM 1000 MG: 1 INJECTION, POWDER, FOR SOLUTION INTRAMUSCULAR; INTRAVENOUS at 17:22

## 2021-04-10 RX ADMIN — TRAZODONE HYDROCHLORIDE 100 MG: 100 TABLET ORAL at 21:48

## 2021-04-10 RX ADMIN — OXYCODONE HYDROCHLORIDE AND ACETAMINOPHEN 1 TABLET: 5; 325 TABLET ORAL at 21:51

## 2021-04-10 RX ADMIN — FLUOXETINE 10 MG: 10 CAPSULE ORAL at 10:27

## 2021-04-10 RX ADMIN — LEVALBUTEROL 1.25 MG: 1.25 SOLUTION, CONCENTRATE RESPIRATORY (INHALATION) at 07:24

## 2021-04-10 RX ADMIN — PREGABALIN 150 MG: 150 CAPSULE ORAL at 21:48

## 2021-04-10 RX ADMIN — GUAIFENESIN 600 MG: 600 TABLET, EXTENDED RELEASE ORAL at 21:48

## 2021-04-10 RX ADMIN — OXYCODONE HYDROCHLORIDE AND ACETAMINOPHEN 1000 MG: 500 TABLET ORAL at 10:11

## 2021-04-10 RX ADMIN — GUAIFENESIN 600 MG: 600 TABLET, EXTENDED RELEASE ORAL at 10:11

## 2021-04-10 RX ADMIN — IPRATROPIUM BROMIDE 0.5 MG: 0.5 SOLUTION RESPIRATORY (INHALATION) at 07:24

## 2021-04-10 RX ADMIN — Medication 500 MG: at 10:11

## 2021-04-10 RX ADMIN — SODIUM CHLORIDE: 9 INJECTION, SOLUTION INTRAVENOUS at 10:06

## 2021-04-10 RX ADMIN — SODIUM CHLORIDE, PRESERVATIVE FREE 10 ML: 5 INJECTION INTRAVENOUS at 21:47

## 2021-04-10 RX ADMIN — PREGABALIN 150 MG: 150 CAPSULE ORAL at 10:10

## 2021-04-10 RX ADMIN — FLUOXETINE 10 MG: 10 CAPSULE ORAL at 21:49

## 2021-04-10 RX ADMIN — ACETAMINOPHEN 650 MG: 325 TABLET ORAL at 17:22

## 2021-04-10 RX ADMIN — OXYCODONE HYDROCHLORIDE AND ACETAMINOPHEN 1 TABLET: 5; 325 TABLET ORAL at 10:11

## 2021-04-10 RX ADMIN — AZITHROMYCIN MONOHYDRATE 500 MG: 250 TABLET ORAL at 10:11

## 2021-04-10 RX ADMIN — OXYCODONE HYDROCHLORIDE AND ACETAMINOPHEN 1000 MG: 500 TABLET ORAL at 21:48

## 2021-04-10 ASSESSMENT — PAIN SCALES - GENERAL
PAINLEVEL_OUTOF10: 9
PAINLEVEL_OUTOF10: 9

## 2021-04-10 NOTE — CONSULTS
[address], [address]                                  CONSULTATION    PATIENT NAME: Zayda Pugh                      :        1955  MED REC NO:   [id number]                              ROOM:       2111  ACCOUNT NO:   [de-identified]                           ADMIT DATE: 2021  PROVIDER:     Carrie Jay    CONSULT DATE:  2021    REASON FOR CONSULTATION:  Cough. HISTORY OF PRESENT ILLNESS:  The patient is a very pleasant 78-year-old  female. The patient was admitted to the hospital because of dizziness,  Meniere's disease  Reportedly, she had some dizziness and fell. The  patient reportedly had hit her head and left knee on the cabinet with  her left foot entangled under the cabinet. Reportedly, she had a cold a  couple of months ago. The patient was seen and admitted for a diagnosis  of community acquired pneumonia. Of note, the CT scan was done which  revealed no pulmonary embolism. There were ground-glass opacities on  both lungs. There was a concern for multifocal pneumonia. There was  evidence of emphysema and chronic subpleural reticular changes. When I was able to look at the CT scan, I was seeing some element of  very mild bronchiectasis. I did see some _____ honeycombing as well. We have been asked to help with the patient's management at this time. PAST MEDICAL HISTORY:  Notable for a diagnosis of arthritis, history of  depression, history of hyperlipidemia. PAST SURGICAL HISTORY:  Includes multiple colonoscopies. There was  colon surgery in the past.    CURRENT MEDICATIONS:  Include Percocet p.r.n., Prozac, Antivert,  Desyrel, ascorbic acid. ALLERGIES:  Multiple including Lyndal File where she gets short of  breath. DARVON, RESTORIL, TOPAMAX. SOCIAL HISTORY:  She is a former smoker, quit back in 2019. Smoked for  15 years.     REVIEW OF SYSTEMS:  As per HPI, otherwise systems reviewed are negative. PHYSICAL EXAMINATION:  GENERAL:  The patient is a 66-year-old female resting quietly. HEENT:  No supraclavicular lymph node enlargement. LUNGS:  I do hear _____ crackles at the bases posteriorly. I do not  hear any rhonchi or wheezes. CARDIOVASCULAR:  Regular rhythm. ABDOMEN:  Soft. EXTREMITIES:  No edema. LABORATORY RESULTS:  Procalcitonin level 0.06. BUN and creatinine  13/0. 59. White count 5.6, hemoglobin 11, platelet count of 137. D-dimer 0.67. IMPRESSION AND PLAN:  I am concerned that the patient may have pulmonary  fibrosis, either UIP or nonspecific interstitial pneumonitis. My  thought would be is that to get a high resolution CT scan at this time. Right now, she is not having any fever, chills, sweats. No sputum  production. A presence of a normal procalcitonin level suggest she may  not have community acquired pneumonia. We will await the high  resolution CT scan results. I did show the images on computer to the  patient. I tried to explain to the best of my ability what fibrosis is. She does not have any occupational exposures per se. We will await the  high resolution CT scan results. Thank you Dr. Garret Goff for the consult.         Maria Rosario    D: 04/09/2021 16:22:12       T: 04/09/2021 21:30:38     [email or web address]  Job#: [id number]     Doc#: [id number]    CC:

## 2021-04-10 NOTE — CARE COORDINATION
ONGOING DISCHARGE PLAN:    Patient is alert and oriented x4. Spoke with patient's spouse  Parthenia Sacks regarding discharge plan and patient confirms that plan is still to go home with no needs. Patient using Flutter valve while talking with her  Parthenia Sacks. Remains on IV Zithromax, Iv Rocephin,     Will continue to follow for additional discharge needs.     Electronically signed by Freddie Montgomery RN on 4/10/2021 at 3:12 PM

## 2021-04-10 NOTE — PROGRESS NOTES
[place] [address] Internal Medicine    Progress Note     4/10/2021    2:38 PM    Name:   Jenn Coles  MRN:     [id number]     [place]:      [de-identified]   Room:   [facility] Day:  2  Admit Date:  4/8/2021  2:29 PM    PCP:   Milagros Waters MD  Code Status:  Full Code    Subjective:     C/C:   Chief Complaint   Patient presents with    Dizziness    Fall     Principal Problem:    Acute exacerbation of bronchiectasis ([facility] [address]  Active Problems:    Dizziness    UIP (usual interstitial pneumonitis) ([facility] [address]  Resolved Problems:    * No resolved hospital problems. *      Interval History Status: improved. *No new symptoms     Significant last 24 hr data reviewed ;   Vitals:    04/10/21 0600 04/10/21 0724 04/10/21 0735 04/10/21 1230   BP:   (!) 126/52 (!) 121/44   Pulse:   84 72   Resp:  16 16 18   Temp:   97.8 °F (36.6 °C) 97.5 °F (36.4 °C)   TempSrc:   Oral Oral   SpO2:  97% 96% 100%   Weight: 201 lb 11.5 oz (91.5 kg)      Height:          Recent Results (from the past 24 hour(s))   SPECIMEN REJECTION    Collection Time: 04/10/21 10:43 AM   Result Value Ref Range    Specimen Source . BLOOD     Ordered Test CBC BMPX     Reason for Rejection       Unable to perform testing: Specimen quantity not sufficient.    - NOT REPORTED    Basic Metabolic Panel w/ Reflex to MG    Collection Time: 04/10/21  1:40 PM   Result Value Ref Range    Glucose 128 (H) 70 - 99 mg/dL    BUN 8 8 - 23 mg/dL    CREATININE 0.62 0.50 - 0.90 mg/dL    Bun/Cre Ratio NOT REPORTED 9 - 20    Calcium 8.7 8.6 - 10.4 mg/dL    Sodium 138 135 - 144 mmol/L    Potassium 4.1 3.7 - 5.3 mmol/L    Chloride 105 98 - 107 mmol/L    CO2 24 20 - 31 mmol/L    Anion Gap 9 9 - 17 mmol/L    GFR Non-African American >60 >60 mL/min    GFR African American >60 >60 mL/min    GFR Comment          GFR Staging NOT REPORTED    CBC    Collection Time: 04/10/21  1:40 PM   Result Value Ref Range    WBC 11.0 3.5 - 11.0 k/uL    RBC 3.60 (L) 4.0 - 5.2 m/uL Hemoglobin 11.4 (L) 12.0 - 16.0 g/dL    Hematocrit 34.0 (L) 36 - 46 %    MCV 94.2 80 - 100 fL    MCH 31.6 26 - 34 pg    MCHC 33.5 31 - 37 g/dL    RDW 13.9 11.5 - 14.9 %    Platelets 111 564 - 994 k/uL    MPV 8.2 6.0 - 12.0 fL    NRBC Automated NOT REPORTED per 100 WBC     No results for input(s): POCGLU in the last 72 hours. Xr Knee Left (3 Views)    Result Date: 4/8/2021  EXAMINATION: THREE XRAY VIEWS OF THE LEFT KNEE 4/8/2021 2:49 pm COMPARISON: Left knee radiographs performed 06/03/2008. HISTORY: ORDERING SYSTEM PROVIDED HISTORY: fall TECHNOLOGIST PROVIDED HISTORY: fall Reason for Exam: PT CO Pain in multiple sites of her body including her chest, Left knee, and Left foot after falling yesterday. Acuity: Acute Type of Exam: Initial FINDINGS: There is no acute osseous abnormality. The joint spaces are maintained. There is no joint effusion. The periarticular soft tissues are unremarkable. No acute osseous or soft tissue abnormality. Xr Foot Left (min 3 Views)    Result Date: 4/8/2021  EXAMINATION: THREE XRAY VIEWS OF THE LEFT FOOT 4/8/2021 2:49 pm COMPARISON: None. HISTORY: ORDERING SYSTEM PROVIDED HISTORY: fall TECHNOLOGIST PROVIDED HISTORY: fall Reason for Exam: PT CO Pain in multiple sites of her body including her chest, Left knee, and Left foot after falling yesterday. Acuity: Acute Type of Exam: Initial FINDINGS: There is no acute osseous abnormality. The joint spaces are maintained. There is a calcaneal spur at the plantar fascial attachment. The surrounding soft tissues are unremarkable. No acute osseous or soft tissue abnormality.      Ct Head Wo Contrast    Result Date: 4/8/2021  EXAMINATION: CT OF THE HEAD WITHOUT CONTRAST  4/8/2021 2:05 pm TECHNIQUE: CT of the head was performed without the administration of intravenous contrast. Dose modulation, iterative reconstruction, and/or weight based adjustment of the mA/kV was utilized to reduce the radiation dose to as low as reasonably achievable. COMPARISON: 09/28/2019. HISTORY: ORDERING SYSTEM PROVIDED HISTORY: vertigo, fall TECHNOLOGIST PROVIDED HISTORY: vertigo, fall Decision Support Exception->Emergency Medical Condition (MA) Reason for Exam: Dizziness; Fall Acuity: Acute Type of Exam: Initial Mechanism of Injury: pt states became dizzy and fell hitting top of head Relevant Medical/Surgical History: no surgery to area of interest FINDINGS: BRAIN/VENTRICLES: There is no acute intracranial hemorrhage, mass effect or midline shift. No abnormal extra-axial fluid collection. The gray-white differentiation is maintained without evidence of an acute infarct. There is no evidence of hydrocephalus. A 1 cm focus of small vessel infarction or gliosis from prior ischemic event was again noted in the left external capsule. It is unchanged from 09/28/2019. ORBITS: The visualized portion of the orbits demonstrate no acute abnormality. SINUSES: The visualized paranasal sinuses and mastoid air cells demonstrate no acute abnormality. SOFT TISSUES/SKULL:  No acute abnormality of the visualized skull or soft tissues. No acute intracranial abnormality. Old 1 cm focus of small vessel infarction or gliosis from prior ischemic event in the left external capsule. It is unchanged from 09/28/2019. Xr Chest Portable    Result Date: 4/8/2021  EXAMINATION: ONE XRAY VIEW OF THE CHEST 4/8/2021 2:49 pm COMPARISON: October 16, 2020, chest exam, 2019 HISTORY: ORDERING SYSTEM PROVIDED HISTORY: cough, sob TECHNOLOGIST PROVIDED HISTORY: cough, sob Reason for Exam: PT CO Pain in multiple sites of her body including her chest, Left knee, and Left foot after falling yesterday. Acuity: Acute Type of Exam: Initial FINDINGS: Stable normal cardiopericardial silhouette Interval increase in prominence of bibasilar asymmetric, left greater than right, interstitial infiltrates.   These infiltrates were vaguely present on the 2019 exam. No significant pleural or mediastinal findings     Interval increase in prominence of asymmetric bibasilar interstitial infiltrates. Findings likely related to mild interval progression in interstitial fibrosis No definite acute cardiopulmonary findings     Ct Chest High Resolution    Result Date: 4/9/2021  EXAMINATION: CT IMAGES OF THE CHEST WITHOUT CONTRAST, HIGH RESOLUTION 4/9/2021 TECHNIQUE: CT of the chest was performed without the administration of intravenous contrast. High resolution CT imaging was performed of the lungs. Multiplanar reformatted images are provided for review. Dose modulation, iterative reconstruction, and/or weight based adjustment of the mA/kV was utilized to reduce the radiation dose to as low as reasonably achievable. High resolution CT images were performed in the supine inspiration, supine expiration, and prone inspiration positions. COMPARISON: None. HISTORY: ORDERING SYSTEM PROVIDED HISTORY: Please evaluate for UIP. No contrast please. Please do prone and supine positions. Thank you very much TECHNOLOGIST PROVIDED HISTORY: Please evaluate for UIP. No contrast please. Please do prone and supine positions. Thank you very much Reason for Exam: bilateral acquired pneumonia, dizziness, fall Acuity: Acute Type of Exam: Initial FINDINGS: Mediastinum: Calcified mediastinal and hilar lymph nodes are present. No ava mediastinal adenopathy or acute aortic abnormality is noted. Borderline cardiac size is noted without pericardial effusion or epicardial adenopathy. HRCT Findings/Lungs/pleura: A left upper lobe pulmonary nodule 4.4 mm is noted, image 9 series 2. Septal thickening and pleuroparenchymal scarring is present in the lingula with honeycomb type appearance. Peripheral honeycomb appearance is present in both lower lobes. Peripheral tree-in-bud appearance is present right upper lobe most significant in the inferior right upper lobe. Central bronchiectasis is noted with bilateral lower lobe bronchiectasis.   No effusions or extrapleural air are noted. No air trapping is noted on the expiratory images. Diffuse moderate emphysematous changes are noted. Tracheobronchial tree is patent. Lower lobe ground-glass opacities are noted including a 9 mm opacity anterior aspect of the right lower lobe, image 80 series 4, and in the left lower lobe, of 6.2 mm, image 80 series 4. In summary, the patient has honeycomb lung destruction with basal and peripheral predominance, traction bronchiectasis with basal predominance and atypical nodules with ground-glass attenuation. Constellation of findings favors UIP/IPF. Upper Abdomen: Adrenals are unremarkable. No acute abnormality in the included upper abdomen. Soft Tissues/Bones: No acute osseous or soft tissue abnormality. No ava axillary adenopathy. 1.  Honeycomb pattern with peripheral and basilar predominance. 2.  Traction bronchiectasis most significant in the lower lobes. 3.  Scattered nodules, many ground-glass. 4. Interstitial thickening and fibrosis. 5.  Constellation of findings favors UIP/IPF. RECOMMENDATIONS: Fleischner Society guidelines for follow-up and management of incidentally detected pulmonary nodules: Multiple Solid Nodules: . Nodule size greater than 8 mm In a low-risk patient, CT at 3-6 months, then consider CT at 18-24 months. In a high-risk patient, CT at 3-6 months, then CT at 18-24 months. - Low risk patients include individuals with minimal or absent history of smoking and other known risk factors. - High risk patients include individuals with a history or smoking or known risk factors. Radiology 2017 http://pubs. rsna.org/doi/full/10.1148/radiol. 4929109229     Ct Chest Pulmonary Embolism W Contrast    Result Date: 4/8/2021  EXAMINATION: CTA OF THE CHEST 4/8/2021 3:14 pm TECHNIQUE: CTA of the chest was performed after the administration of intravenous contrast.  Multiplanar reformatted images are provided for review. MIP images are provided for review.  Dose wheezing  Cardiovascular:  negative for chest pain, chest pressure/discomfort, lower extremity edema, palpitations  Gastrointestinal:  negative for abdominal pain, constipation, diarrhea, nausea, vomiting  Neurological:  negative for dizziness, headache  Data:     Past Medical History:  no change     Social History:  no change    Family History: @no change    Vitals:      I/O (24Hr): Intake/Output Summary (Last 24 hours) at 4/10/2021 1438  Last data filed at 4/10/2021 1006  Gross per 24 hour   Intake 700 ml   Output 900 ml   Net -200 ml       Labs:    URINE ANALYSIS: No results found for: LABURIN     CBC:  Lab Results   Component Value Date    WBC 11.0 04/10/2021    HGB 11.4 04/10/2021     04/10/2021     06/02/2012        BMP:    Lab Results   Component Value Date     04/10/2021    K 4.1 04/10/2021     04/10/2021    CO2 24 04/10/2021    BUN 8 04/10/2021    CREATININE 0.62 04/10/2021    GLUCOSE 128 04/10/2021    GLUCOSE 97 06/02/2012      LIVER PROFILE:  Lab Results   Component Value Date    ALT 11 04/08/2021    AST 18 04/08/2021    PROT 7.1 04/08/2021    BILITOT 0.23 04/08/2021    BILIDIR <0.08 03/11/2021    LABALBU 3.9 04/08/2021    LABALBU 4.3 11/07/2011               Radiology:  Medications:      Allergies:      Current Meds:   Scheduled Meds:    aspirin  81 mg Oral Daily    oyster shell calcium/vitamin D  500 mg Oral Daily    FLUoxetine  10 mg Oral BID    traZODone  100 mg Oral Nightly    vitamin C  1,000 mg Oral BID    pregabalin  150 mg Oral BID    sodium chloride flush  5-40 mL Intravenous 2 times per day    enoxaparin  40 mg Subcutaneous Daily    cefTRIAXone (ROCEPHIN) IV  1,000 mg Intravenous Q24H    azithromycin  500 mg Oral Daily    guaiFENesin  600 mg Oral BID     Continuous Infusions:    sodium chloride       PRN Meds: levalbuterol, sodium chloride nebulizer, levalbuterol, meclizine, oxyCODONE-acetaminophen, sodium chloride flush, sodium chloride, acetaminophen, Patient counseled on need to follow-up with pulmonologist  Discussed case with Dr. Kait Strong.   Follow-up  Patient advised to stop taking diet pills including Garcia Florentino MD  4/10/2021  2:38 PM

## 2021-04-10 NOTE — H&P
a past surgical history that includes Ovary removal (Bilateral); Wrist Arthroplasty (Left); Dilation and curettage of uterus; Abdominal exploration surgery; Colon surgery; back surgery; Colonoscopy; Foot surgery (Right); Hand surgery (Right, 6/18/14); and Colonoscopy (N/A, 11/30/2020).    FAMILY HISTORY    Patient family history includes Heart Disease in her father; High Blood Pressure in her father.     SOCIAL HISTORY    Patient  reports that she quit smoking about 19 months ago. Her smoking use included cigarettes. She has a 3.75 pack-year smoking history. She has never used smokeless tobacco. She reports current alcohol use of about 2.0 standard drinks of alcohol per week. She reports that she does not use drugs.      HOME MEDICATIONS        Home Medications   Prior to Admission medications    Medication Sig Start Date End Date Taking? Authorizing Provider   oxyCODONE-acetaminophen (PERCOCET) 5-325 MG per tablet Take 1 tablet by mouth 2 times daily as needed for Pain.     Yes Historical Provider, MD   FLUoxetine (PROZAC) 10 MG capsule Take 1 capsule by mouth 2 times daily 4/5/21   Yes Noe Gilman MD   phentermine (ADIPEX-P) 37.5 MG tablet Take 1 tablet by mouth every morning (before breakfast) for 30 days.  3/16/21 4/15/21 Yes Noe Gilman MD   meclizine (ANTIVERT) 25 MG tablet Take 1 tablet by mouth twice daily as needed 1/15/21   Yes Noe Gilman MD   traZODone (DESYREL) 100 MG tablet 1 tab at night 12/14/20   Yes Noe Gilman MD   pregabalin (LYRICA) 150 MG capsule Take 150 mg by mouth 2 times daily.      Yes Historical Provider, MD   vitamin C (ASCORBIC ACID) 500 MG tablet Take 1,000 mg by mouth 2 times daily     Yes Historical Provider, MD   Calcium Carbonate-Vitamin D (OSCAL 500/200 D-3 PO) Take 500 mg by mouth daily     Yes Historical Provider, MD   aspirin (ASPIRIN CHILDRENS) 81 MG chewable tablet Take 1 tablet by mouth daily 4/5/17   Yes Shankar Morales MD   zoster recombinant adjuvanted vaccine [facility]) 50 MCG/0.5ML SUSR injection Inject 0.5 mLs into the muscle See Admin Instructions 1 dose now and repeat in 2-6 months 12/14/20 6/12/21   Bruno Bowman MD            ALLERGIES      Ambien [zolpidem tartrate], Ativan [lorazepam], Darvon [propoxyphene], Restoril [temazepam], and Topamax [topiramate]     REVIEW OF SYSTEMS      Review of Systems   Constitutional: Negative for chills, diaphoresis and fever. HENT: Negative for congestion and sore throat. Respiratory: Positive for cough and shortness of breath. Negative for wheezing. Cardiovascular: Negative for chest pain, palpitations and leg swelling. Gastrointestinal: Negative for abdominal pain, constipation, diarrhea, nausea and vomiting. Genitourinary: Negative for dysuria, frequency and urgency. Musculoskeletal: Positive for arthralgias (left knee pain). Negative for back pain and myalgias. Left foot pain; fall   Skin: Negative for rash. Neurological: Positive for dizziness and light-headedness. Negative for weakness and headaches. Psychiatric/Behavioral: The patient is not nervous/anxious.       PHYSICAL EXAM      BP (!) 114/55   Pulse 70   Temp 98.4 °F (36.9 °C) (Oral)   Resp 17   Ht 5' 7\" (1.702 m)   Wt 199 lb 8.3 oz (90.5 kg)   SpO2 96%   BMI 31.25 kg/m²  Body mass index is 31.25 kg/m².       Physical Exam  Constitutional:       General: She is not in acute distress. Appearance: She is well-developed. She is not diaphoretic. HENT:      Head: Normocephalic and atraumatic. Eyes:      Conjunctiva/sclera: Conjunctivae normal.      Pupils: Pupils are equal, round, and reactive to light. Neck:      Musculoskeletal: Normal range of motion and neck supple. Trachea: No tracheal deviation. Cardiovascular:      Rate and Rhythm: Normal rate and regular rhythm. Heart sounds: Normal heart sounds. No murmur. No friction rub. No gallop.     Pulmonary:      Effort: Pulmonary effort is normal. No respiratory distress. Breath sounds: Rales (posterior bases bilat) present. No wheezing. Chest:      Chest wall: No tenderness. Abdominal:      General: Bowel sounds are normal. There is no distension. Palpations: Abdomen is soft. Tenderness: There is no abdominal tenderness. There is no guarding. Musculoskeletal: Normal range of motion. General: Tenderness (dorsal surface left foot and left knee) present. Lymphadenopathy:      Cervical: No cervical adenopathy. Skin:     General: Skin is warm and dry. Coloration: Skin is not pale. Findings: Bruising (dorsal surface of left food) present. No erythema or rash. Neurological:      Mental Status: She is alert and oriented to person, place, and time. Motor: No seizure activity. Coordination: Coordination normal.   Psychiatric:         Behavior: Behavior normal.         Thought Content: Thought content normal.         DIAGNOSTICS      EKG:         EKG 12 Lead [[id number]]     Collected: 04/08/21 1538     Updated: 04/08/21 1539       Ventricular Rate 66 BPM     Atrial Rate 66 BPM     P-R Interval 170 ms     QRS Duration 86 ms     Q-T Interval 402 ms     QTc Calculation (Bazett) 421 ms     P Axis -2 degrees     R Axis 26 degrees     T Axis 10 degrees   Narrative:     Normal sinus rhythm   Normal ECG   When compared with ECG of 16-OCT-2020 18:50,   Nonspecific T wave abnormality, improved in Anterior leads      Labs:  CBC:       Recent Labs     04/08/21  1452   WBC 9.0   HGB 12.5         BMP:        Recent Labs     04/08/21  1452      K 4.2      CO2 28   BUN 23   CREATININE 0.75   GLUCOSE 102*      S. Calcium:      Recent Labs     04/08/21  1452   CALCIUM 9.8      S. Ionized Calcium:No results for input(s): IONCA in the last 72 hours. S. Magnesium:No results for input(s): MG in the last 72 hours. S. Phosphorus:No results for input(s): PHOS in the last 72 hours.   S. Glucose:No results for input(s): POCGLU in the last 72 hours. Glycosylated hemoglobin A1C:         Lab Results   Component Value Date     LABA1C 5.4 12/14/2020      Hepatic:       Recent Labs     04/08/21  1452   AST 18   ALT 11   ALKPHOS 87      CARDIAC ENZY:       Recent Labs     04/08/21  1452   TROPHS <6      INR: No results for input(s): INR in the last 72 hours. BNP:       Recent Labs     04/08/21  1452   PROBNP 29      ABGs: No results for input(s): PH, PCO2, PO2, HCO3, O2SAT in the last 72 hours. Lipids: No results for input(s): CHOL, TRIG, HDL, LDLCALC in the last 72 hours.     Invalid input(s): LDL  Pancreatic functions:No results for input(s): LIPASE, AMYLASE in the last 72 hours. Lyndall Rickers: No results for input(s): LACTA in the last 72 hours. Thyroid functions:         Lab Results   Component Value Date     TSH 2.70 08/20/2019      U/A:No results for input(s): NITRITE, COLORU, [address], RBCUA, MUCUS, BACTERIA, CLARITYU, SPECGRAV, LEUKOCYTESUR, BLOODU, GLUCOSEU, AMORPHOUS in the last 72 hours.     Invalid input(s): KETONESU     Imaging/Diagonstics:      Xr Knee Left (3 Views)     Result Date: 4/8/2021  EXAMINATION: THREE XRAY VIEWS OF THE LEFT KNEE 4/8/2021 2:49 pm COMPARISON: Left knee radiographs performed 06/03/2008. HISTORY: ORDERING SYSTEM PROVIDED HISTORY: fall TECHNOLOGIST PROVIDED HISTORY: fall Reason for Exam: PT CO Pain in multiple sites of her body including her chest, Left knee, and Left foot after falling yesterday. Acuity: Acute Type of Exam: Initial FINDINGS: There is no acute osseous abnormality. The joint spaces are maintained. There is no joint effusion. The periarticular soft tissues are unremarkable.      No acute osseous or soft tissue abnormality.      Xr Foot Left (min 3 Views)     Result Date: 4/8/2021  EXAMINATION: THREE XRAY VIEWS OF THE LEFT FOOT 4/8/2021 2:49 pm COMPARISON: None.  HISTORY: ORDERING SYSTEM PROVIDED HISTORY: fall TECHNOLOGIST PROVIDED HISTORY: fall Reason for Exam: PT CO Pain in multiple sites of her body including her chest, Left knee, and Left foot after falling yesterday. Acuity: Acute Type of Exam: Initial FINDINGS: There is no acute osseous abnormality. The joint spaces are maintained. There is a calcaneal spur at the plantar fascial attachment. The surrounding soft tissues are unremarkable.      No acute osseous or soft tissue abnormality.      Ct Head Wo Contrast     Result Date: 4/8/2021  EXAMINATION: CT OF THE HEAD WITHOUT CONTRAST  4/8/2021 2:05 pm TECHNIQUE: CT of the head was performed without the administration of intravenous contrast. Dose modulation, iterative reconstruction, and/or weight based adjustment of the mA/kV was utilized to reduce the radiation dose to as low as reasonably achievable. COMPARISON: 09/28/2019. HISTORY: ORDERING SYSTEM PROVIDED HISTORY: vertigo, fall TECHNOLOGIST PROVIDED HISTORY: vertigo, fall Decision Support Exception->Emergency Medical Condition (MA) Reason for Exam: Dizziness; Fall Acuity: Acute Type of Exam: Initial Mechanism of Injury: pt states became dizzy and fell hitting top of head Relevant Medical/Surgical History: no surgery to area of interest FINDINGS: BRAIN/VENTRICLES: There is no acute intracranial hemorrhage, mass effect or midline shift. No abnormal extra-axial fluid collection. The gray-white differentiation is maintained without evidence of an acute infarct. There is no evidence of hydrocephalus. A 1 cm focus of small vessel infarction or gliosis from prior ischemic event was again noted in the left external capsule. It is unchanged from 09/28/2019. ORBITS: The visualized portion of the orbits demonstrate no acute abnormality. SINUSES: The visualized paranasal sinuses and mastoid air cells demonstrate no acute abnormality. SOFT TISSUES/SKULL:  No acute abnormality of the visualized skull or soft tissues.      No acute intracranial abnormality.  Old 1 cm focus of small vessel infarction or gliosis from prior ischemic event in the left is normal in caliber. Mediastinum: There are shotty mediastinal and hilar lymph nodes. .  The heart and pericardium demonstrate no acute abnormality. There is no acute abnormality of the thoracic aorta. Coronary artery atherosclerosis. Lungs/pleura: There is no pleural effusion or pneumothorax. Emphysema with subpleural reticular changes, similar prior examination. There is new superimposed ground-glass and nodular opacity throughout both lungs. Upper Abdomen: Limited images of the upper abdomen demonstrate no acute abnormality. Soft Tissues/Bones: No acute bone or soft tissue abnormality.      1. No pulmonary embolus. 2. Ground-glass opacity within both lungs, concerning for multifocal pneumonia. 3. Emphysema and chronic subpleural reticular changes are similar to prior examination. RECOMMENDATIONS: Imaging features can be seen with viral pneumonia, though are nonspecific and can occur with a variety of infectious and noninfectious processes. PneInd Reference: https://pubs. rsna.org/doi/full/10.1148/ryct. 5820109119      ASSESSMENT  and  PLAN      Principal Problem:    Community acquired pneumonia, bilateral  Active Problems:    Dizziness  Resolved Problems:    * No resolved hospital problems.  *     Plan:     Community Acquired Pneumonia   -CT chest-groundglass opacities bilateral  --Concerning for multifocal pneumonia  -IV Rocephin and Zithromax initiated in ED  --continue on admission  -Rapid Covid negative  -Mucinex twice daily  -DuoNeb aerosols every 4 hours  -Albuterol aerosols as needed  -Pulmonology consulted in ED  -Pneumovax before discharge if applicable     Dizziness/lightheadedness  -Denies LOC  -history of Ménière's disease  -Check orthostatic VS q shift  -Continue home dose PRN meclizine  -EKG - NSR - rate 66  -IVF - NS @ 100 ml/hr  -Hold Adipex for now     Fall  -Xrays Lt foot and knee no gerald abnormality  -CT head - no acute intercranial abnormality  -PT & OT eval and treat  -Fall

## 2021-04-10 NOTE — PROGRESS NOTES
Pulmonary Progress Note  [facility]      Patient - Eva Cortez,  Age - 77 y.o.    - 1955      Room Number - 1/-01   MRN -  [id number]   Acct # - [de-identified]  Date of Admission -  2021  2:29 PM        Consulting Yasmin Rosario MD  Primary Care Physician - Wanda Macias MD     SUBJECTIVE   Remains on room air, has not really gotten out of bed  She says that respiratory treatments are making her shake    OBJECTIVE   VITALS    height is 5' 7\" (1.702 m) and weight is 201 lb 11.5 oz (91.5 kg). Her oral temperature is 97.8 °F (36.6 °C). Her blood pressure is 126/52 (abnormal) and her pulse is 84. Her respiration is 16 and oxygen saturation is 96%. Body mass index is 31.59 kg/m². Temperature Range: Temp: 97.8 °F (36.6 °C) Temp  Av.4 °F (36.9 °C)  Min: 97.3 °F (36.3 °C)  Max: 100.4 °F (38 °C)  BP Range:  Systolic (27TFH), BTM:754 , Min:90 , THS:650     Diastolic (36AOD), VKF:94, Min:44, Max:86    Pulse Range: Pulse  Av  Min: 75  Max: 128  Respiration Range: Resp  Av  Min: 16  Max: 16  Current Pulse Ox[de-identified]  SpO2: 96 %  24HR Pulse Ox Range:  SpO2  Av.3 %  Min: 93 %  Max: 100 %  Oxygen Amount and Delivery: Wt Readings from Last 3 Encounters:   04/10/21 201 lb 11.5 oz (91.5 kg)   21 202 lb 12.8 oz (92 kg)   21 200 lb (90.7 kg)       I/O (24 Hours)    Intake/Output Summary (Last 24 hours) at 4/10/2021 1225  Last data filed at 4/10/2021 1006  Gross per 24 hour   Intake 980 ml   Output 900 ml   Net 80 ml       EXAM     General Appearance  Awake, alert, oriented, in no acute distress  HEENT - normocephalic, atraumatic.  []  Mallampati  [] Crowded airway   [] Macroglossia  []  Retrognathia  [] Micrognathia  []  Normal tongue size []  Normal Bite  [] Jasper sign positive    Neck - Supple,  trachea midline   Lungs -bilateral Velcro rales  Cardiovascular - Heart sounds are normal. Regular rate and rhythm   Abdomen - Soft, nontender, nondistended, no masses or organomegaly  Neurologic - There are no focal motor or sensory deficits  Skin - No bruising or bleeding  Extremities - No clubbing, cyanosis, edema    MEDS      aspirin  81 mg Oral Daily    oyster shell calcium/vitamin D  500 mg Oral Daily    FLUoxetine  10 mg Oral BID    traZODone  100 mg Oral Nightly    vitamin C  1,000 mg Oral BID    pregabalin  150 mg Oral BID    levalbuterol  1.25 mg Nebulization 4x daily    ipratropium  0.5 mg Nebulization 4x daily    sodium chloride flush  5-40 mL Intravenous 2 times per day    enoxaparin  40 mg Subcutaneous Daily    cefTRIAXone (ROCEPHIN) IV  1,000 mg Intravenous Q24H    azithromycin  500 mg Oral Daily    guaiFENesin  600 mg Oral BID      sodium chloride 100 mL/hr at 04/10/21 1006    sodium chloride       levalbuterol, sodium chloride nebulizer, meclizine, oxyCODONE-acetaminophen, sodium chloride flush, sodium chloride, acetaminophen, promethazine **OR** ondansetron, magnesium hydroxide, polyvinyl alcohol    LABS   CBC   Recent Labs     04/09/21  0627   WBC 5.6   HGB 11.0*   HCT 33.8*   MCV 96.1        BMP:   Lab Results   Component Value Date     04/09/2021    K 4.5 04/09/2021     04/09/2021    CO2 24 04/09/2021    BUN 13 04/09/2021    LABALBU 3.9 04/08/2021    LABALBU 4.3 11/07/2011    CREATININE 0.59 04/09/2021    CALCIUM 8.4 04/09/2021    GFRAA >60 04/09/2021    LABGLOM >60 04/09/2021     ABGs:No results found for: PHART, PO2ART, DGG8GSQ No results found for: IFIO2, MODE, SETTIDVOL, SETPEEP  Ionized Calcium:  No results found for: IONCA  Magnesium:    Lab Results   Component Value Date    MG 2.0 09/28/2019     Phosphorus:  No results found for: PHOS     LIVER PROFILE   Recent Labs     04/08/21  1452   AST 18   ALT 11   BILITOT 0.23*   ALKPHOS 87     INR No results for input(s): INR in the last 72 hours.   PTT No results found for: APTT      RADIOLOGY ASSESSMENT/PLAN   Principal Problem:    Acute exacerbation of bronchiectasis (HCC)  Active Problems:    Dizziness    UIP (usual interstitial pneumonitis) (HCC)  Resolved Problems:    * No resolved hospital problems.  *    CT of chest consistent with UIP/IPF  Explained results of CAT scan to patient and her   Told them that at this point do not know the course of her disease  Probably has acute exacerbation of bronchiectasis as well rather than  Pneumonia  Can make aerosols as needed, discontinue Atrovent she has a very distant history of smoking and no documented COPD  Add flutter valve therapy  Electronically signed by Karlie Martinez MD on 4/10/2021 at 12:25 PM

## 2021-04-11 VITALS
BODY MASS INDEX: 31.42 KG/M2 | WEIGHT: 200.18 LBS | RESPIRATION RATE: 16 BRPM | SYSTOLIC BLOOD PRESSURE: 101 MMHG | TEMPERATURE: 97.9 F | HEIGHT: 67 IN | OXYGEN SATURATION: 95 % | DIASTOLIC BLOOD PRESSURE: 51 MMHG | HEART RATE: 68 BPM

## 2021-04-11 PROBLEM — J47.9 BRONCHIECTASIS WITHOUT COMPLICATION (HCC): Status: ACTIVE | Noted: 2021-04-10

## 2021-04-11 LAB
ANION GAP SERPL CALCULATED.3IONS-SCNC: 8 MMOL/L (ref 9–17)
BUN BLDV-MCNC: 8 MG/DL (ref 8–23)
BUN/CREAT BLD: ABNORMAL (ref 9–20)
CALCIUM SERPL-MCNC: 8.8 MG/DL (ref 8.6–10.4)
CHLORIDE BLD-SCNC: 106 MMOL/L (ref 98–107)
CO2: 26 MMOL/L (ref 20–31)
CREAT SERPL-MCNC: 0.69 MG/DL (ref 0.5–0.9)
GFR AFRICAN AMERICAN: >60 ML/MIN
GFR NON-AFRICAN AMERICAN: >60 ML/MIN
GFR SERPL CREATININE-BSD FRML MDRD: ABNORMAL ML/MIN/{1.73_M2}
GFR SERPL CREATININE-BSD FRML MDRD: ABNORMAL ML/MIN/{1.73_M2}
GLUCOSE BLD-MCNC: 82 MG/DL (ref 70–99)
HCT VFR BLD CALC: 36.1 % (ref 36–46)
HEMOGLOBIN: 11.9 G/DL (ref 12–16)
MCH RBC QN AUTO: 31.2 PG (ref 26–34)
MCHC RBC AUTO-ENTMCNC: 33.1 G/DL (ref 31–37)
MCV RBC AUTO: 94.2 FL (ref 80–100)
NRBC AUTOMATED: ABNORMAL PER 100 WBC
PDW BLD-RTO: 14.1 % (ref 11.5–14.9)
PLATELET # BLD: 234 K/UL (ref 150–450)
PMV BLD AUTO: 9.8 FL (ref 6–12)
POTASSIUM SERPL-SCNC: 4.2 MMOL/L (ref 3.7–5.3)
RBC # BLD: 3.83 M/UL (ref 4–5.2)
SODIUM BLD-SCNC: 140 MMOL/L (ref 135–144)
WBC # BLD: 7.9 K/UL (ref 3.5–11)

## 2021-04-11 PROCEDURE — 99239 HOSP IP/OBS DSCHRG MGMT >30: CPT | Performed by: INTERNAL MEDICINE

## 2021-04-11 PROCEDURE — 80048 BASIC METABOLIC PNL TOTAL CA: CPT

## 2021-04-11 PROCEDURE — 6370000000 HC RX 637 (ALT 250 FOR IP): Performed by: NURSE PRACTITIONER

## 2021-04-11 PROCEDURE — 6360000002 HC RX W HCPCS: Performed by: INTERNAL MEDICINE

## 2021-04-11 PROCEDURE — 85027 COMPLETE CBC AUTOMATED: CPT

## 2021-04-11 PROCEDURE — G0378 HOSPITAL OBSERVATION PER HR: HCPCS

## 2021-04-11 PROCEDURE — 2580000003 HC RX 258: Performed by: INTERNAL MEDICINE

## 2021-04-11 PROCEDURE — 36415 COLL VENOUS BLD VENIPUNCTURE: CPT

## 2021-04-11 PROCEDURE — 96372 THER/PROPH/DIAG INJ SC/IM: CPT

## 2021-04-11 RX ORDER — GUAIFENESIN 600 MG/1
600 TABLET, EXTENDED RELEASE ORAL 2 TIMES DAILY
Qty: 60 TABLET | Refills: 0 | Status: SHIPPED | OUTPATIENT
Start: 2021-04-11

## 2021-04-11 RX ADMIN — ENOXAPARIN SODIUM 40 MG: 40 INJECTION SUBCUTANEOUS at 09:28

## 2021-04-11 RX ADMIN — AZITHROMYCIN MONOHYDRATE 500 MG: 250 TABLET ORAL at 09:27

## 2021-04-11 RX ADMIN — OXYCODONE HYDROCHLORIDE AND ACETAMINOPHEN 1000 MG: 500 TABLET ORAL at 09:27

## 2021-04-11 RX ADMIN — OXYCODONE HYDROCHLORIDE AND ACETAMINOPHEN 1 TABLET: 5; 325 TABLET ORAL at 09:30

## 2021-04-11 RX ADMIN — Medication 500 MG: at 09:27

## 2021-04-11 RX ADMIN — PREGABALIN 150 MG: 150 CAPSULE ORAL at 09:27

## 2021-04-11 RX ADMIN — ASPIRIN 81 MG: 81 TABLET, CHEWABLE ORAL at 09:27

## 2021-04-11 RX ADMIN — SODIUM CHLORIDE, PRESERVATIVE FREE 10 ML: 5 INJECTION INTRAVENOUS at 09:28

## 2021-04-11 RX ADMIN — FLUOXETINE 10 MG: 10 CAPSULE ORAL at 09:27

## 2021-04-11 RX ADMIN — GUAIFENESIN 600 MG: 600 TABLET, EXTENDED RELEASE ORAL at 09:27

## 2021-04-11 ASSESSMENT — PAIN SCALES - GENERAL: PAINLEVEL_OUTOF10: 9

## 2021-04-11 NOTE — PLAN OF CARE
BRONCHOSPASM/BRONCHOCONSTRICTION   [x]  IMPROVE AERATION/BREATH SOUNDS  [x]   ADMINISTER BRONCHODILATOR THERAPY AS APPROPRIATE  [x]   ASSESS BREATH SOUNDS  []   IMPLEMENT AEROSOL/MDI PROTOCOL  [x]   PATIENT EDUCATION AS NEEDED
Problem: Falls - Risk of:  Goal: Will remain free from falls  Description: Will remain free from falls  Outcome: Met This Shift  No falls noted this shift. Patient ambulates with x1 staff assistance without difficulty. Bed kept in low position. Safe environment maintained. Bedside table & call light in reach. Uses call light appropriately when needing assistance. Problem: Daily Care:  Goal: Daily care needs are met  Description: Daily care needs are met  Outcome: Met This Shift  Patient and staff currently meeting all patient's daily care needs. Patient encouraged to participate and complete all ADLs per self. Will continue to monitor for opportunities for patient to meet all ADLs per self. Problem: Pain:  Goal: Patient's pain/discomfort is manageable  Description: Patient's pain/discomfort is manageable  Outcome: Met This Shift  Pt medicated with pain medication prn. Assessed all pain characteristics including level, type, location, frequency, and onset. Non-pharmacologic interventions offered to pt as well. Pt states pain is tolerable at this time. Will continue to monitor.
Problem: Falls - Risk of:  Goal: Will remain free from falls  Description: Will remain free from falls  Outcome: Ongoing  Goal: Absence of physical injury  Description: Absence of physical injury  Outcome: Ongoing     Problem: Infection:  Goal: Will remain free from infection  Description: Will remain free from infection  Outcome: Ongoing     Problem: Safety:  Goal: Free from accidental physical injury  Description: Free from accidental physical injury  Outcome: Ongoing  Goal: Free from intentional harm  Description: Free from intentional harm  Outcome: Ongoing     Problem: Daily Care:  Goal: Daily care needs are met  Description: Daily care needs are met  Outcome: Ongoing     Problem: Pain:  Goal: Patient's pain/discomfort is manageable  Description: Patient's pain/discomfort is manageable  Outcome: Ongoing  Goal: Pain level will decrease  Description: Pain level will decrease  Outcome: Ongoing  Goal: Control of acute pain  Description: Control of acute pain  Outcome: Ongoing  Goal: Control of chronic pain  Description: Control of chronic pain  Outcome: Ongoing     Problem: Skin Integrity:  Goal: Skin integrity will stabilize  Description: Skin integrity will stabilize  Outcome: Ongoing     Problem: Discharge Planning:  Goal: Patients continuum of care needs are met  Description: Patients continuum of care needs are met  Outcome: Ongoing     Problem: Musculor/Skeletal Functional Status  Goal: Highest potential functional level  Outcome: Ongoing  Goal: Absence of falls  Outcome: Ongoing
Control of acute pain  Description: Control of acute pain  4/10/2021 0122 by Graciela Heredia RN  Outcome: Ongoing  4/9/2021 1935 by Franco Biswas RN  Outcome: Ongoing  Goal: Control of chronic pain  Description: Control of chronic pain  4/10/2021 0122 by Graciela Heredia RN  Outcome: Ongoing  4/9/2021 1935 by Franco Biswas RN  Outcome: Ongoing     Problem: Skin Integrity:  Goal: Skin integrity will stabilize  Description: Skin integrity will stabilize  4/10/2021 0122 by Graciela Heredia RN  Outcome: Ongoing  4/9/2021 1935 by Franco Biswas RN  Outcome: Ongoing     Problem: Discharge Planning:  Goal: Patients continuum of care needs are met  Description: Patients continuum of care needs are met  4/10/2021 0122 by Graciela Heredia RN  Outcome: Ongoing  4/9/2021 1935 by Franco Biswas RN  Outcome: Ongoing     Problem: Musculor/Skeletal Functional Status  Goal: Highest potential functional level  4/10/2021 0122 by Graciela Heredia RN  Outcome: Ongoing  4/9/2021 1935 by Franco Biswas RN  Outcome: Ongoing  Goal: Absence of falls  4/10/2021 0122 by Graciela Heredia RN  Outcome: Ongoing  4/9/2021 1935 by Franco Biswas RN  Outcome: Ongoing

## 2021-04-11 NOTE — DISCHARGE SUMMARY
[facility] Internal Medicine    Discharge Summary     Patient ID: Dali Woody  :  1955   MRN: [id number]     ACCOUNT:  [de-identified]   Patient's PCP: Manuelito Tadeo MD  Admit Date: 2021   Discharge Date:    Length of Stay: 3  Code Status:  Full Code  Admitting Physician: Dot Bell MD  Discharge Physician: Muriel Mcdonald MD     Active Discharge Diagnoses:     Primary Problem  Bronchiectasis without complication [facility])      [facility]    Diagnosis Date Noted    UIP (usual interstitial pneumonitis) ([facility] [J84.112] 04/10/2021    Bronchiectasis without complication ([facility] [address] [U11.0] 04/10/2021    Dizziness [R42] 2019       Admission Condition:  fair     Discharged Condition: fair    Hospital Stay:     Hospital Course:  Dali Woody is a 77 y.o. female who was admitted for the management of   .     , presented with Dizziness and Fall      ,                         Bronchiectasis without complication ([facility] [address] Principal Problem:    Bronchiectasis without complication ([address]  Active Problems:    Dizziness    UIP (usual interstitial pneumonitis) (HCC)  Resolved Problems:    * No resolved hospital problems. *       Significant therapeutic interventions:      Principal Problem:    Acute exacerbation of bronchiectasis (HCC)  Active Problems:    Dizziness    UIP (usual interstitial pneumonitis) (HCC)  Resolved Problems:    * No resolved hospital problems.  *     Will plan on doing home oxygen evaluation with exercise  Outpatient pulmonary function testing  Do not feel that she needs any antibiotics at the time of discharge  Follow-up in office in 4 weeks we will need to decide a plan of action for her IPF        The patient is a 66 y.o.  female, with a history of COPD, dizziness, Ménière's disease, and obesity, who presents with dizziness and fall.  According to patient, she was standing at the kitchen counter today when she became dizzy and lightheaded and fell to the floor, hitting her head and left knee on the cabinet, with her left foot entangled under the cabinet.  Additionally, patient reports that she had a \"cold\" a couple of months ago and states that the cough has been lingering for about 6 weeks.  Symptoms are associated with left knee pain and shortness of breath with activity - especially with climbing stairs.  Denies fever, chills, chest pain, abdominal pain, nausea, vomiting, diarrhea, and urinary symptoms.  Denies loss of consciousness.  There are no aggravating or alleviating factors.  Symptoms are reported      Stop adipex     Signed             Show:Clear all  [x]Manual[x]Template[]Copied    Added by:  [x]Meseret Humphries RCP    []Alaina for details  %Home Oxygen Evaluation     Room air SpO2 at Rest = 95%     Room air with exercise/exertion = 87%     SpO2 on prescribed O2 level at  2LPM  at rest = 96%    2LPM  with exercise/exertion = 95%     Pt does qualify for home oxygen at this time prescribed at 2LPm             Significant Diagnostic Studies:   Labs / Micro:       Results for orders placed or performed during the hospital encounter of 04/08/21   COVID-19, Rapid    Specimen: Nasopharyngeal Swab   Result Value Ref Range    Specimen Description . NASOPHARYNGEAL SWAB     SARS-CoV-2, Rapid Not Detected Not Detected   CBC Auto Differential   Result Value Ref Range    WBC 9.0 3.5 - 11.0 k/uL    RBC 4.05 4.0 - 5.2 m/uL    Hemoglobin 12.5 12.0 - 16.0 g/dL    Hematocrit 38.3 36 - 46 %    MCV 94.6 80 - 100 fL    MCH 31.0 26 - 34 pg    MCHC 32.8 31 - 37 g/dL    RDW 13.9 11.5 - 14.9 %    Platelets 802 406 - 779 k/uL    MPV 8.5 6.0 - 12.0 fL    NRBC Automated NOT REPORTED per 100 WBC    Differential Type NOT REPORTED     Seg Neutrophils 72 (H) 36 - 66 %    Lymphocytes 17 (L) 24 - 44 %    Monocytes 7 1 - 7 %    Eosinophils % 3 0 - 4 %    Basophils 1 0 - 2 %    Immature Granulocytes NOT REPORTED 0 % 144 mmol/L    Potassium 4.5 3.7 - 5.3 mmol/L    Chloride 110 (H) 98 - 107 mmol/L    CO2 24 20 - 31 mmol/L    Anion Gap 7 (L) 9 - 17 mmol/L    GFR Non-African American >60 >60 mL/min    GFR African American >60 >60 mL/min    GFR Comment          GFR Staging NOT REPORTED    CBC   Result Value Ref Range    WBC 5.6 3.5 - 11.0 k/uL    RBC 3.52 (L) 4.0 - 5.2 m/uL    Hemoglobin 11.0 (L) 12.0 - 16.0 g/dL    Hematocrit 33.8 (L) 36 - 46 %    MCV 96.1 80 - 100 fL    MCH 31.4 26 - 34 pg    MCHC 32.6 31 - 37 g/dL    RDW 14.4 11.5 - 14.9 %    Platelets 023 532 - 578 k/uL    MPV 9.1 6.0 - 12.0 fL    NRBC Automated NOT REPORTED per 100 WBC   Troponin   Result Value Ref Range    Troponin, High Sensitivity <6 0 - 14 ng/L    Troponin T NOT REPORTED <0.03 ng/mL    Troponin Interp NOT REPORTED    SPECIMEN REJECTION   Result Value Ref Range    Specimen Source . BLOOD     Ordered Test CBC BMPX     Reason for Rejection       Unable to perform testing: Specimen quantity not sufficient.    - NOT REPORTED    Basic Metabolic Panel w/ Reflex to MG   Result Value Ref Range    Glucose 128 (H) 70 - 99 mg/dL    BUN 8 8 - 23 mg/dL    CREATININE 0.62 0.50 - 0.90 mg/dL    Bun/Cre Ratio NOT REPORTED 9 - 20    Calcium 8.7 8.6 - 10.4 mg/dL    Sodium 138 135 - 144 mmol/L    Potassium 4.1 3.7 - 5.3 mmol/L    Chloride 105 98 - 107 mmol/L    CO2 24 20 - 31 mmol/L    Anion Gap 9 9 - 17 mmol/L    GFR Non-African American >60 >60 mL/min    GFR African American >60 >60 mL/min    GFR Comment          GFR Staging NOT REPORTED    CBC   Result Value Ref Range    WBC 11.0 3.5 - 11.0 k/uL    RBC 3.60 (L) 4.0 - 5.2 m/uL    Hemoglobin 11.4 (L) 12.0 - 16.0 g/dL    Hematocrit 34.0 (L) 36 - 46 %    MCV 94.2 80 - 100 fL    MCH 31.6 26 - 34 pg    MCHC 33.5 31 - 37 g/dL    RDW 13.9 11.5 - 14.9 %    Platelets 174 116 - 966 k/uL    MPV 8.2 6.0 - 12.0 fL    NRBC Automated NOT REPORTED per 100 WBC   Basic Metabolic Panel w/ Reflex to MG   Result Value Ref Range    Glucose 82 70 - 99 mg/dL    BUN 8 8 - 23 mg/dL    CREATININE 0.69 0.50 - 0.90 mg/dL    Bun/Cre Ratio NOT REPORTED 9 - 20    Calcium 8.8 8.6 - 10.4 mg/dL    Sodium 140 135 - 144 mmol/L    Potassium 4.2 3.7 - 5.3 mmol/L    Chloride 106 98 - 107 mmol/L    CO2 26 20 - 31 mmol/L    Anion Gap 8 (L) 9 - 17 mmol/L    GFR Non-African American >60 >60 mL/min    GFR African American >60 >60 mL/min    GFR Comment          GFR Staging NOT REPORTED    CBC   Result Value Ref Range    WBC 7.9 3.5 - 11.0 k/uL    RBC 3.83 (L) 4.0 - 5.2 m/uL    Hemoglobin 11.9 (L) 12.0 - 16.0 g/dL    Hematocrit 36.1 36 - 46 %    MCV 94.2 80 - 100 fL    MCH 31.2 26 - 34 pg    MCHC 33.1 31 - 37 g/dL    RDW 14.1 11.5 - 14.9 %    Platelets 188 870 - 455 k/uL    MPV 9.8 6.0 - 12.0 fL    NRBC Automated NOT REPORTED per 100 WBC   EKG 12 Lead   Result Value Ref Range    Ventricular Rate 66 BPM    Atrial Rate 66 BPM    P-R Interval 170 ms    QRS Duration 86 ms    Q-T Interval 402 ms    QTc Calculation (Bazett) 421 ms    P Axis -2 degrees    R Axis 26 degrees    T Axis 10 degrees        {Radiology:    Xr Knee Left (3 Views)    Result Date: 4/8/2021  EXAMINATION: THREE XRAY VIEWS OF THE LEFT KNEE 4/8/2021 2:49 pm COMPARISON: Left knee radiographs performed 06/03/2008. HISTORY: ORDERING SYSTEM PROVIDED HISTORY: fall TECHNOLOGIST PROVIDED HISTORY: fall Reason for Exam: PT CO Pain in multiple sites of her body including her chest, Left knee, and Left foot after falling yesterday. Acuity: Acute Type of Exam: Initial FINDINGS: There is no acute osseous abnormality. The joint spaces are maintained. There is no joint effusion. The periarticular soft tissues are unremarkable. No acute osseous or soft tissue abnormality. Xr Foot Left (min 3 Views)    Result Date: 4/8/2021  EXAMINATION: THREE XRAY VIEWS OF THE LEFT FOOT 4/8/2021 2:49 pm COMPARISON: None.  HISTORY: ORDERING SYSTEM PROVIDED HISTORY: fall TECHNOLOGIST PROVIDED HISTORY: fall Reason for Exam: PT CO Pain in multiple sites of her body including her chest, Left knee, and Left foot after falling yesterday. Acuity: Acute Type of Exam: Initial FINDINGS: There is no acute osseous abnormality. The joint spaces are maintained. There is a calcaneal spur at the plantar fascial attachment. The surrounding soft tissues are unremarkable. No acute osseous or soft tissue abnormality. Ct Head Wo Contrast    Result Date: 4/8/2021  EXAMINATION: CT OF THE HEAD WITHOUT CONTRAST  4/8/2021 2:05 pm TECHNIQUE: CT of the head was performed without the administration of intravenous contrast. Dose modulation, iterative reconstruction, and/or weight based adjustment of the mA/kV was utilized to reduce the radiation dose to as low as reasonably achievable. COMPARISON: 09/28/2019. HISTORY: ORDERING SYSTEM PROVIDED HISTORY: vertigo, fall TECHNOLOGIST PROVIDED HISTORY: vertigo, fall Decision Support Exception->Emergency Medical Condition (MA) Reason for Exam: Dizziness; Fall Acuity: Acute Type of Exam: Initial Mechanism of Injury: pt states became dizzy and fell hitting top of head Relevant Medical/Surgical History: no surgery to area of interest FINDINGS: BRAIN/VENTRICLES: There is no acute intracranial hemorrhage, mass effect or midline shift. No abnormal extra-axial fluid collection. The gray-white differentiation is maintained without evidence of an acute infarct. There is no evidence of hydrocephalus. A 1 cm focus of small vessel infarction or gliosis from prior ischemic event was again noted in the left external capsule. It is unchanged from 09/28/2019. ORBITS: The visualized portion of the orbits demonstrate no acute abnormality. SINUSES: The visualized paranasal sinuses and mastoid air cells demonstrate no acute abnormality. SOFT TISSUES/SKULL:  No acute abnormality of the visualized skull or soft tissues. No acute intracranial abnormality.  Old 1 cm focus of small vessel infarction or gliosis from prior ischemic event in the left external capsule. It is unchanged from 09/28/2019. Xr Chest Portable    Result Date: 4/8/2021  EXAMINATION: ONE XRAY VIEW OF THE CHEST 4/8/2021 2:49 pm COMPARISON: October 16, 2020, chest exam, 2019 HISTORY: ORDERING SYSTEM PROVIDED HISTORY: cough, sob TECHNOLOGIST PROVIDED HISTORY: cough, sob Reason for Exam: PT CO Pain in multiple sites of her body including her chest, Left knee, and Left foot after falling yesterday. Acuity: Acute Type of Exam: Initial FINDINGS: Stable normal cardiopericardial silhouette Interval increase in prominence of bibasilar asymmetric, left greater than right, interstitial infiltrates. These infiltrates were vaguely present on the 2019 exam. No significant pleural or mediastinal findings     Interval increase in prominence of asymmetric bibasilar interstitial infiltrates. Findings likely related to mild interval progression in interstitial fibrosis No definite acute cardiopulmonary findings     Ct Chest High Resolution    Result Date: 4/9/2021  EXAMINATION: CT IMAGES OF THE CHEST WITHOUT CONTRAST, HIGH RESOLUTION 4/9/2021 TECHNIQUE: CT of the chest was performed without the administration of intravenous contrast. High resolution CT imaging was performed of the lungs. Multiplanar reformatted images are provided for review. Dose modulation, iterative reconstruction, and/or weight based adjustment of the mA/kV was utilized to reduce the radiation dose to as low as reasonably achievable. High resolution CT images were performed in the supine inspiration, supine expiration, and prone inspiration positions. COMPARISON: None. HISTORY: ORDERING SYSTEM PROVIDED HISTORY: Please evaluate for UIP. No contrast please. Please do prone and supine positions. Thank you very much TECHNOLOGIST PROVIDED HISTORY: Please evaluate for UIP. No contrast please. Please do prone and supine positions.  Thank you very much Reason for Exam: bilateral acquired pneumonia, dizziness, fall Acuity: Acute Type of Exam: Initial FINDINGS: Mediastinum: Calcified mediastinal and hilar lymph nodes are present. No ava mediastinal adenopathy or acute aortic abnormality is noted. Borderline cardiac size is noted without pericardial effusion or epicardial adenopathy. HRCT Findings/Lungs/pleura: A left upper lobe pulmonary nodule 4.4 mm is noted, image 9 series 2. Septal thickening and pleuroparenchymal scarring is present in the lingula with honeycomb type appearance. Peripheral honeycomb appearance is present in both lower lobes. Peripheral tree-in-bud appearance is present right upper lobe most significant in the inferior right upper lobe. Central bronchiectasis is noted with bilateral lower lobe bronchiectasis. No effusions or extrapleural air are noted. No air trapping is noted on the expiratory images. Diffuse moderate emphysematous changes are noted. Tracheobronchial tree is patent. Lower lobe ground-glass opacities are noted including a 9 mm opacity anterior aspect of the right lower lobe, image 80 series 4, and in the left lower lobe, of 6.2 mm, image 80 series 4. In summary, the patient has honeycomb lung destruction with basal and peripheral predominance, traction bronchiectasis with basal predominance and atypical nodules with ground-glass attenuation. Constellation of findings favors UIP/IPF. Upper Abdomen: Adrenals are unremarkable. No acute abnormality in the included upper abdomen. Soft Tissues/Bones: No acute osseous or soft tissue abnormality. No ava axillary adenopathy. 1.  Honeycomb pattern with peripheral and basilar predominance. 2.  Traction bronchiectasis most significant in the lower lobes. 3.  Scattered nodules, many ground-glass. 4. Interstitial thickening and fibrosis. 5.  Constellation of findings favors UIP/IPF. RECOMMENDATIONS: Fleischner Society guidelines for follow-up and management of incidentally detected pulmonary nodules: Multiple Solid Nodules: .  Nodule size greater than 8 mm In a low-risk patient, CT at 3-6 months, then consider CT at 18-24 months. In a high-risk patient, CT at 3-6 months, then CT at 18-24 months. - Low risk patients include individuals with minimal or absent history of smoking and other known risk factors. - High risk patients include individuals with a history or smoking or known risk factors. Radiology 2017 http://pubs. rsna.org/doi/full/10.1148/radiol. 9104196151     Ct Chest Pulmonary Embolism W Contrast    Result Date: 4/8/2021  EXAMINATION: CTA OF THE CHEST 4/8/2021 3:14 pm TECHNIQUE: CTA of the chest was performed after the administration of intravenous contrast.  Multiplanar reformatted images are provided for review. MIP images are provided for review. Dose modulation, iterative reconstruction, and/or weight based adjustment of the mA/kV was utilized to reduce the radiation dose to as low as reasonably achievable. COMPARISON: 10/16/2020 HISTORY: ORDERING SYSTEM PROVIDED HISTORY: dizzy, sob, cough. elevated ddimer. TECHNOLOGIST PROVIDED HISTORY: dizzy, sob, cough. elevated ddimer. Decision Support Exception->Emergency Medical Condition (MA) Reason for Exam: patient c/o midsternal chest discomfort, elevated d-dimer, cough Acuity: Acute Type of Exam: Initial FINDINGS: Pulmonary Arteries: Pulmonary arteries are adequately opacified for evaluation. No evidence of intraluminal filling defect to suggest pulmonary embolism. Main pulmonary artery is normal in caliber. Mediastinum: There are shotty mediastinal and hilar lymph nodes. .  The heart and pericardium demonstrate no acute abnormality. There is no acute abnormality of the thoracic aorta. Coronary artery atherosclerosis. Lungs/pleura: There is no pleural effusion or pneumothorax. Emphysema with subpleural reticular changes, similar prior examination. There is new superimposed ground-glass and nodular opacity throughout both lungs.  Upper Abdomen: Limited images of the upper abdomen demonstrate no acute abnormality. Soft Tissues/Bones: No acute bone or soft tissue abnormality. 1. No pulmonary embolus. 2. Ground-glass opacity within both lungs, concerning for multifocal pneumonia. 3. Emphysema and chronic subpleural reticular changes are similar to prior examination. RECOMMENDATIONS: Imaging features can be seen with viral pneumonia, though are nonspecific and can occur with a variety of infectious and noninfectious processes. PneInd Reference: https://pubs. rsna.org/doi/full/10.1148/ryct. 0000686507        Consultations:    Consults:     Final Specialist Recommendations/Findings:   IP CONSULT TO INTERNAL MEDICINE  IP CONSULT TO PULMONOLOGY      The patient was seen and examined on day of discharge and this discharge summary is in conjunction with any daily progress note from day of discharge. Discharge plan:     Disposition: Home    Physician Follow Up: With PCP and as specified     Requiring Further Evaluation/Follow Up POST HOSPITALIZATION/Incidental Findings:    Diet: regular     Activity: As tolerated    I  Discharge Medications:      Medication List      ASK your doctor about these medications    aspirin 81 MG chewable tablet  Commonly known as: Aspirin Childrens  Take 1 tablet by mouth daily     FLUoxetine 10 MG capsule  Commonly known as: PROZAC  Take 1 capsule by mouth 2 times daily     meclizine 25 MG tablet  Commonly known as: ANTIVERT  Take 1 tablet by mouth twice daily as needed     OSCAL 500/200 D-3 PO     oxyCODONE-acetaminophen 5-325 MG per tablet  Commonly known as: PERCOCET     phentermine 37.5 MG tablet  Commonly known as: ADIPEX-P  Take 1 tablet by mouth every morning (before breakfast) for 30 days.      pregabalin 150 MG capsule  Commonly known as: LYRICA     Shingrix 50 MCG/0.5ML Susr injection  Generic drug: zoster recombinant adjuvanted vaccine  Inject 0.5 mLs into the muscle See Admin Instructions 1 dose now and repeat in 2-6 months     traZODone 100 MG tablet  Commonly known as: DESYREL  1 tab at night     vitamin C 500 MG tablet  Commonly known as: ASCORBIC ACID              Time spent on discharge planning ;          [] less than 30 minutes . [x]   more  than 30 minutes . Ellectronically signed by   Florecita Moon MD      Thank you Dr. Mathew Dennison MD for the opportunity to be involved in this patient's care. Please note that this chart was generated using voice recognition Dragon dictation software. Although every effort was made to ensure the accuracy of this automated transcription, some errors in transcription may have occurred.

## 2021-04-11 NOTE — PROGRESS NOTES
or organomegaly  Neurologic - There are no focal motor or sensory deficits  Skin - No bruising or bleeding  Extremities - No clubbing, cyanosis, edema    MEDS      aspirin  81 mg Oral Daily    oyster shell calcium/vitamin D  500 mg Oral Daily    FLUoxetine  10 mg Oral BID    traZODone  100 mg Oral Nightly    vitamin C  1,000 mg Oral BID    pregabalin  150 mg Oral BID    sodium chloride flush  5-40 mL Intravenous 2 times per day    enoxaparin  40 mg Subcutaneous Daily    cefTRIAXone (ROCEPHIN) IV  1,000 mg Intravenous Q24H    azithromycin  500 mg Oral Daily    guaiFENesin  600 mg Oral BID      sodium chloride       levalbuterol, sodium chloride nebulizer, levalbuterol, meclizine, oxyCODONE-acetaminophen, sodium chloride flush, sodium chloride, acetaminophen, promethazine **OR** ondansetron, magnesium hydroxide, polyvinyl alcohol    LABS   CBC   Recent Labs     04/11/21  0517   WBC 7.9   HGB 11.9*   HCT 36.1   MCV 94.2        BMP:   Lab Results   Component Value Date     04/11/2021    K 4.2 04/11/2021     04/11/2021    CO2 26 04/11/2021    BUN 8 04/11/2021    LABALBU 3.9 04/08/2021    LABALBU 4.3 11/07/2011    CREATININE 0.69 04/11/2021    CALCIUM 8.8 04/11/2021    GFRAA >60 04/11/2021    LABGLOM >60 04/11/2021     ABGs:No results found for: PHART, PO2ART, TEX7IBF No results found for: IFIO2, MODE, SETTIDVOL, SETPEEP  Ionized Calcium:  No results found for: IONCA  Magnesium:    Lab Results   Component Value Date    MG 2.0 09/28/2019     Phosphorus:  No results found for: PHOS     LIVER PROFILE No results for input(s): AST, ALT, LIPASE, BILIDIR, BILITOT, ALKPHOS in the last 72 hours. Invalid input(s): AMYLASE,  ALB  INR No results for input(s): INR in the last 72 hours.   PTT No results found for: APTT      RADIOLOGY     (See actual reports for details)    ASSESSMENT/PLAN   Principal Problem:    Acute exacerbation of bronchiectasis (HCC)  Active Problems:    Dizziness    UIP (usual interstitial pneumonitis) ([facility] [address] [address]  Resolved Problems:    * No resolved hospital problems. *    Will plan on doing home oxygen evaluation with exercise  Outpatient pulmonary function testing  Do not feel that she needs any antibiotics at the time of discharge  Follow-up in office in 4 weeks we will need to decide a plan of action for her IPF    Electronically signed by Shell Do MD on 4/11/2021 at 4:02 PM    Addendum: Patient did qualify for oxygen therapy she will need 2 L with exertion and at nighttime.  A face-to-face discussion was done on the benefits of oxygen therapy and she is agreeable

## 2021-04-11 NOTE — CARE COORDINATION
Abdiel Imre [facility]. 12. Encounter Date/Time: 2021 [address] Account: [de-identified]    MRN: [id number]    Patient: Roslyn Monge    Contact Serial #: [id number]      ENCOUNTER          Patient Class: I Private Enc? No Unit RM BD: [facility] PROG    Hospital Service: Intermediate   Encounter DX: Community acquired pneum*   ADM Provider: Sriram Marina MD   Procedure:     ATT Provider: Sriram Marina MD   REF Provider:        Admission DX: Community acquired pneumonia, bilateral and codes:       PATIENT                 Name: Roslyn Monge : 1955 (77 yrs)   Address: [place] [address] Sex: Female   [place] city: [address] [place] [address]         Marital Status:    Employer: RETIRED         Denominational: [place]   Primary Care Provider: Gely Ponce MD         Primary Phone: [phone]   EMERGENCY CONTACT   Contact Name Legal Guardian? Relationship to Patient Home Phone Work Phone   1. Roger Mena  2. Mikaela Shanks      Spouse  Other [phone]              GUARANTOR            Guarantor: Roslyn Monge     : 1955   Address: [address] [place] [address] Sex: Female     [place] [address]     Relation to Patient: Self       Home Phone: [phone]   Guarantor ID: [id number]       Work Phone: [phone]   Guarantor Employer: RETIRED         Status: DISABLED      COVERAGE        PRIMARY INSURANCE   Payor: [facility] MEDICARE Plan: [facility] MEDICARE COMPLETE   Payor Address: ,          Group Number: [id number] Insurance Type: INDEMNITY   Subscriber Name: Erin De Leon : 1955   Subscriber ID: [id number] Pat. Rel. to Sub: Self   SECONDARY INSURANCE   Payor:   Plan:     Payor Address:  ,           Group Number:   Insurance Type:     Subscriber Name:   Subscriber :     Subscriber ID:   Pat.  Rel. to Sub:           [id number]        CSN                                    Req/Control # Asia Ramirez retrieving Specimen ID]                                   Order Date:  2021  [id number]     Patient Information      Name:  Ling Aguillon  :  1955  Age:  77 y.o. Address:  [address] [place] [address]   Zip:  [address]  PCP: Ganga Mathews MD Sex:  F  SSN: [id number]  Home Phone: [phone]  Work Phone:  [phone]  Patient MRN:  [id number]    Alt Patient ID:  [id number]  PCP Phone: [phone]       Authorizing Provider Information       AUTHORIZING PROVIDER: Rosey Gagnon MD  Physician ID: [id number]  NPI:  [id number]  Site:   Address: [address] [place] [address] Zip: [address]  Phone: [phone]  Fax: [phone]              EQUIPMENT ORDERED  DME Order for Home Oxygen as OP [[id number]] (ORD   #:   [id number]) Priority  Routine Class  Hospital Performed        Associated Diagnosis:          Comments:    You must complete the order parameters below and add the medical necessity documentation for this DME in a separate note.     Stationary Oxygen Concentrator at 2 lpm via Nasal Cannula     Stationary Prescribed at:  Non Continuous while walking or exercise     Portable Gaseous O2 System and contents at 2 lpm via Nasal Cannula     3-4hrs/day     Diagnosis: pulmonary fibrosis   Hypoxic resp failure chronic  Length of need: Lifetime            Scheduling Instructions:                                 Specimen Source             Collection Date    Collection Time    Order Status    Expected Date                Electronically Signed By  Rosey Gagnon MD Date  2021           Responsible Party Chang Connelly-ActID   Relationship Account Type Home Phone   Jose Juan Right [phone] [address] [address] [address] Self P/F [phone]   Employer   Work Phone   [employer]   [address] [employer]     Primary Insurance  Insurance/Subscriber ID:  [id number]  [address] Name:  Paul Antu              Relationship to Patient: SelfSigned ABN: N    Payor Name:  [facility] MEDICARE   Plan:  [employer] MEDICARE COMPLETE   Group: [id number]  Worker's Comp Date of Injury:            Signed             Show:Clear all  [x]Manual[x]Template[]Copied    Added by:  [x]Meseret Humphries RCP    []Alaina for details  %Home Oxygen Evaluation     Room air SpO2 at Rest = 95%     Room air with exercise/exertion = 87%     SpO2 on prescribed O2 level at  2LPM  at rest = 96%    2LPM  with exercise/exertion = 95%     Pt does qualify for home oxygen at this time prescribed at 2LPm                  Melvin Simpson MD   Physician   Internal Medicine        Discharge Summary   Addendum        Date of Service:  2021  4:16 PM                                            Expand widget buttonCollapse widget button        Show:Clear all      ManualTemplateCopied    Added by:          MD Alaina Vaughan for detailscustomization button                                                                                                                                                                                                                                                                                                           untitled image    [address] Internal Medicine         Discharge Summary         Patient ID: Karen Chacon    :  1955             MRN: [id number]                              ACCOUNT:  [de-identified]     Patient's PCP: Yuko Monterroso MD    Admit Date: 2021     Discharge Date:      Length of Stay: 3    Code Status:  Full Code    Admitting Physician: Emmett Burt MD    Discharge Physician: Melvin Simpson MD            Active Discharge Diagnoses:            Primary Problem    Bronchiectasis without complication [facility])           [facility]             Diagnosis     Date Noted            [facility] [P11.139]     04/10/2021            Bronchiectasis without complication ([facility] [J47.9]     04/10/2021            Dizziness [R42]     09/28/2019                 Admission Condition:  fair            Discharged Condition: fair           Hospital Stay:            Hospital Course:  Raffy Sanchez is a 77 y.o. female who was admitted for the management of   .            , presented with Dizziness and Fall             ,                         Bronchiectasis without complication ([facility] Principal Problem:      Bronchiectasis without complication ([facility]    Active Problems:      Dizziness      UIP (usual interstitial pneumonitis) (HCC)    Resolved Problems:      * No resolved hospital problems. *                 Significant therapeutic interventions:             Principal Problem:      Acute exacerbation of bronchiectasis (HCC)    Active Problems:      Dizziness      UIP (usual interstitial pneumonitis) (HCC)    Resolved Problems:      * No resolved hospital problems. *         Will plan on doing home oxygen evaluation with exercise    Outpatient pulmonary function testing    Do not feel that she needs any antibiotics at the time of discharge    Follow-up in office in 4 weeks we will need to decide a plan of action for her IPF                   The patient is a 77 y.o.  female, with a history of COPD, dizziness, Ménière's disease, and obesity, who presents with dizziness and fall. According to patient, she was standing at the kitchen counter today when she became dizzy and lightheaded and fell to the floor, hitting her head and left knee on the cabinet, with her left foot entangled under the cabinet. Additionally, patient reports that she had a \"cold\" a couple of months ago and states that the cough has been lingering for about 6 weeks. Symptoms are associated with left knee pain and shortness of breath with activity - especially with climbing stairs. Denies fever, chills, chest pain, abdominal pain, nausea, vomiting, diarrhea, and urinary symptoms. Segs Absolute     6.60     1.3 - 9.1 k/uL             Absolute Lymph #     1.50     1.0 - 4.8 k/uL             Absolute Mono #     0.60     0.1 - 1.3 k/uL             Absolute Eos #     0.30     0.0 - 0.4 k/uL             Basophils Absolute     0.10     0.0 - 0.2 k/uL             Absolute Immature Granulocyte     NOT REPORTED     0.00 - 0.30 k/uL             WBC Morphology     NOT REPORTED                   RBC Morphology     NOT REPORTED                   Platelet Estimate     NOT REPORTED             Comprehensive Metabolic Panel w/ Reflex to MG       Result     Value     Ref Range             Glucose     102 (H)     70 - 99 mg/dL             BUN     23     8 - 23 mg/dL             CREATININE     0.75     0.50 - 0.90 mg/dL             Bun/Cre Ratio     NOT REPORTED     9 - 20             Calcium     9.8     8.6 - 10.4 mg/dL             Sodium     137     135 - 144 mmol/L             Potassium     4.2     3.7 - 5.3 mmol/L             Chloride     102     98 - 107 mmol/L             CO2     28     20 - 31 mmol/L             Anion Gap     7 (L)     9 - 17 mmol/L             Alkaline Phosphatase     87     35 - 104 U/L             ALT     11     5 - 33 U/L             AST     18     <32 U/L             Total Bilirubin     0.23 (L)     0.3 - 1.2 mg/dL             Total Protein     7.1     6.4 - 8.3 g/dL             Albumin     3.9     3.5 - 5.2 g/dL             Albumin/Globulin Ratio     NOT REPORTED     1.0 - 2.5             GFR Non-     >60     >60 mL/min             GFR      >60     >60 mL/min             GFR Comment                            GFR Staging     NOT REPORTED             D-Dimer Test       Result     Value     Ref Range             D-Dimer, Quant     0.67 (H)     0.00 - 0.59 mg/L FEU       Troponin       Result     Value     Ref Range             Troponin, High Sensitivity     <6     0 - 14 ng/L             Troponin T     NOT REPORTED     <0.03 ng/mL High Sensitivity     <6     0 - 14 ng/L             Troponin T     NOT REPORTED     <0.03 ng/mL             Troponin Interp     NOT REPORTED             SPECIMEN REJECTION       Result     Value     Ref Range             Specimen Source     . BLOOD                   Ordered Test     CBC BMPX                   Reason for Rejection                               Unable to perform testing: Specimen quantity not sufficient.             -     NOT REPORTED             Basic Metabolic Panel w/ Reflex to MG       Result     Value     Ref Range             Glucose     128 (H)     70 - 99 mg/dL             BUN     8     8 - 23 mg/dL             CREATININE     0.62     0.50 - 0.90 mg/dL             Bun/Cre Ratio     NOT REPORTED     9 - 20             Calcium     8.7     8.6 - 10.4 mg/dL             Sodium     138     135 - 144 mmol/L             Potassium     4.1     3.7 - 5.3 mmol/L             Chloride     105     98 - 107 mmol/L             CO2     24     20 - 31 mmol/L             Anion Gap     9     9 - 17 mmol/L             GFR Non-     >60     >60 mL/min             GFR      >60     >60 mL/min             GFR Comment                            GFR Staging     NOT REPORTED             CBC       Result     Value     Ref Range             WBC     11.0     3.5 - 11.0 k/uL             RBC     3.60 (L)     4.0 - 5.2 m/uL             Hemoglobin     11.4 (L)     12.0 - 16.0 g/dL             Hematocrit     34.0 (L)     36 - 46 %             MCV     94.2     80 - 100 fL             MCH     31.6     26 - 34 pg             MCHC     33.5     31 - 37 g/dL             RDW     13.9     11.5 - 14.9 %             Platelets     480     150 - 450 k/uL             MPV     8.2     6.0 - 12.0 fL             NRBC Automated     NOT REPORTED     per 100 WBC       Basic Metabolic Panel w/ Reflex to MG       Result     Value     Ref Range             Glucose     82     70 - 99 mg/dL             BUN     8     8 - 23 evidence of an acute infarct. There is no evidence of hydrocephalus. A 1 cm focus of small vessel infarction or gliosis from prior ischemic event was again noted in the left external capsule. It is unchanged from 09/28/2019. ORBITS: The visualized portion of the orbits demonstrate no acute abnormality. SINUSES: The visualized paranasal sinuses and mastoid air cells demonstrate no acute abnormality. SOFT TISSUES/SKULL:  No acute abnormality of the visualized skull or soft tissues. No acute intracranial abnormality. Old 1 cm focus of small vessel infarction or gliosis from prior ischemic event in the left external capsule. It is unchanged from 09/28/2019. Xr Chest Portable         Result Date: 4/8/2021    EXAMINATION: ONE XRAY VIEW OF THE CHEST 4/8/2021 2:49 pm COMPARISON: October 16, 2020, chest exam, 2019 HISTORY: ORDERING SYSTEM PROVIDED HISTORY: cough, sob TECHNOLOGIST PROVIDED HISTORY: cough, sob Reason for Exam: PT CO Pain in multiple sites of her body including her chest, Left knee, and Left foot after falling yesterday. Acuity: Acute Type of Exam: Initial FINDINGS: Stable normal cardiopericardial silhouette Interval increase in prominence of bibasilar asymmetric, left greater than right, interstitial infiltrates. These infiltrates were vaguely present on the 2019 exam. No significant pleural or mediastinal findings          Interval increase in prominence of asymmetric bibasilar interstitial infiltrates. Findings likely related to mild interval progression in interstitial fibrosis No definite acute cardiopulmonary findings          Ct Chest High Resolution         Result Date: 4/9/2021    EXAMINATION: CT IMAGES OF THE CHEST WITHOUT CONTRAST, HIGH RESOLUTION 4/9/2021 TECHNIQUE: CT of the chest was performed without the administration of intravenous contrast. High resolution CT imaging was performed of the lungs. Multiplanar reformatted images are provided for review.  Dose modulation, iterative reconstruction, and/or weight based adjustment of the mA/kV was utilized to reduce the radiation dose to as low as reasonably achievable. High resolution CT images were performed in the supine inspiration, supine expiration, and prone inspiration positions. COMPARISON: None. HISTORY: ORDERING SYSTEM PROVIDED HISTORY: Please evaluate for UIP. No contrast please. Please do prone and supine positions. Thank you very much TECHNOLOGIST PROVIDED HISTORY: Please evaluate for UIP. No contrast please. Please do prone and supine positions. Thank you very much Reason for Exam: bilateral acquired pneumonia, dizziness, fall Acuity: Acute Type of Exam: Initial FINDINGS: Mediastinum: Calcified mediastinal and hilar lymph nodes are present. No ava mediastinal adenopathy or acute aortic abnormality is noted. Borderline cardiac size is noted without pericardial effusion or epicardial adenopathy. HRCT Findings/Lungs/pleura: A left upper lobe pulmonary nodule 4.4 mm is noted, image 9 series 2. Septal thickening and pleuroparenchymal scarring is present in the lingula with honeycomb type appearance. Peripheral honeycomb appearance is present in both lower lobes. Peripheral tree-in-bud appearance is present right upper lobe most significant in the inferior right upper lobe. Central bronchiectasis is noted with bilateral lower lobe bronchiectasis. No effusions or extrapleural air are noted. No air trapping is noted on the expiratory images. Diffuse moderate emphysematous changes are noted. Tracheobronchial tree is patent. Lower lobe ground-glass opacities are noted including a 9 mm opacity anterior aspect of the right lower lobe, image 80 series 4, and in the left lower lobe, of 6.2 mm, image 80 series 4. In summary, the patient has honeycomb lung destruction with basal and peripheral predominance, traction bronchiectasis with basal predominance and atypical nodules with ground-glass attenuation.   Constellation of findings favors UIP/IPF. Upper Abdomen: Adrenals are unremarkable. No acute abnormality in the included upper abdomen. Soft Tissues/Bones: No acute osseous or soft tissue abnormality. No ava axillary adenopathy. 1.  Honeycomb pattern with peripheral and basilar predominance. 2.  Traction bronchiectasis most significant in the lower lobes. 3.  Scattered nodules, many ground-glass. 4. Interstitial thickening and fibrosis. 5.  Constellation of findings favors UIP/IPF. RECOMMENDATIONS: Fleischner Society guidelines for follow-up and management of incidentally detected pulmonary nodules: Multiple Solid Nodules: . Nodule size greater than 8 mm In a low-risk patient, CT at 3-6 months, then consider CT at 18-24 months. In a high-risk patient, CT at 3-6 months, then CT at 18-24 months. - Low risk patients include individuals with minimal or absent history of smoking and other known risk factors. - High risk patients include individuals with a history or smoking or known risk factors. Radiology 2017 http://pubs. rsna.org/doi/full/10.1148/radiol. 1344548398          Ct Chest Pulmonary Embolism W Contrast         Result Date: 4/8/2021    EXAMINATION: CTA OF THE CHEST 4/8/2021 3:14 pm TECHNIQUE: CTA of the chest was performed after the administration of intravenous contrast.  Multiplanar reformatted images are provided for review. MIP images are provided for review. Dose modulation, iterative reconstruction, and/or weight based adjustment of the mA/kV was utilized to reduce the radiation dose to as low as reasonably achievable. COMPARISON: 10/16/2020 HISTORY: ORDERING SYSTEM PROVIDED HISTORY: dizzy, sob, cough. elevated ddimer. TECHNOLOGIST PROVIDED HISTORY: dizzy, sob, cough. elevated ddimer.  Decision Support Exception->Emergency Medical Condition (MA) Reason for Exam: patient c/o midsternal chest discomfort, elevated d-dimer, cough Acuity: Acute Type of Exam: Initial FINDINGS: Pulmonary Arteries: Pulmonary tablet    Commonly known as: Aspirin Childrens    Take 1 tablet by mouth daily            FLUoxetine 10 MG capsule    Commonly known as: PROZAC    Take 1 capsule by mouth 2 times daily            meclizine 25 MG tablet    Commonly known as: ANTIVERT    Take 1 tablet by mouth twice daily as needed            OSCAL 500/200 D-3 PO            oxyCODONE-acetaminophen 5-325 MG per tablet    Commonly known as: PERCOCET            phentermine 37.5 MG tablet    Commonly known as: ADIPEX-P    Take 1 tablet by mouth every morning (before breakfast) for 30 days. pregabalin 150 MG capsule    Commonly known as: LYRICA            Shingrix 50 MCG/0.5ML Susr injection    Generic drug: zoster recombinant adjuvanted vaccine    Inject 0.5 mLs into the muscle See Admin Instructions 1 dose now and repeat in 2-6 months            traZODone 100 MG tablet    Commonly known as: DESYREL    1 tab at night            vitamin C 500 MG tablet    Commonly known as: ASCORBIC ACID                                     Time spent on discharge planning ;            ? less than 30 minutes . ? more  than 30 minutes . Ellectronically signed by     Dimitris Brannon MD              Thank you Dr. Austin Garza MD for the opportunity to be involved in this patient's care. Please note that this chart was generated using voice recognition Dragon dictation software. Although every effort was made to ensure the accuracy of this automated transcription, some errors in transcription may have occurred.

## 2021-04-11 NOTE — DISCHARGE INSTR - COC
Continuity of Care Form    Patient Name: Romeo Jenkins   :  1955  MRN:  Amber Pierre date:  2021  Discharge date:  ***    Code Status Order: Full Code   Advance Directives:   Advance Care Flowsheet Documentation       Date/Time Healthcare Directive Type of Healthcare Directive Copy in [address] Agent's Name Healthcare Agent's Phone Number    21  Yes, patient has an advance directive for healthcare treatment  --  --  --  --  --            Admitting Physician:  Lula Carmona MD  PCP: Leanna Gimenez MD    Discharging Nurse: [facility] Unit/Room#: 2111/2111-01  Discharging Unit Phone Number: ***    Emergency Contact:   Extended Emergency Contact Information  Primary Emergency Contact: Edith Magaña  [address] [facility] [address] Phone: [phone]  Mobile Phone: [phone]  Relation: Spouse  Secondary Emergency Contact: Maddi Rodríguez [facility] [address] Phone: [phone]  Mobile Phone: [phone]  Relation: Other    Past Surgical History:  Past Surgical History:   Procedure Laterality Date    ABDOMINAL EXPLORATION SURGERY      BACK SURGERY      X2    COLON SURGERY      COLONOSCOPY      COLONOSCOPY N/A 2020    COLONOSCOPY POLYPECTOMY HOT SNARE performed by Kaylee Spain MD at [facility] Right     HAND SURGERY Right 14    OVARY REMOVAL Bilateral     WRIST ARTHROPLASTY Left        Immunization History:   Immunization History   Administered Date(s) Administered    Influenza Vaccine, unspecified formulation 2018    Influenza, Quadv, IM, PF (6 mo and older Fluzone, Flulaval, Fluarix, and 3 yrs and older Afluria) 10/19/2020    Influenza, Triv, 3 Years and older, IM (Afluria (5 yrs and older) 12/15/2016    Pneumococcal Conjugate 13-valent (Mike Aver) 2015, 2020    Tdap (Boostrix, Adacel) 2020 Active Problems:  Patient Active Problem List   Diagnosis Code    Osteoarthritis of hand M19.049    Sprain of lumbar region S33. 5XXA    Displacement of lumbar intervertebral disc without myelopathy M51.26    Lumbago M54.5    Sprain of sacrum S33. 8XXA    Displacement of intervertebral disc, site unspecified, without myelopathy [id number]    Sprain of ligament of lumbosacral joint S33. 9XXA    Pleurisy R09.1    Acute bronchitis with asthma J20.9, J45.909    Excessive sweating R61    Acute pharyngitis J02.9    Generalized anxiety disorder F41.1    Postlaminectomy syndrome, lumbar region M96.1    Obesity (BMI 30-39. 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    Dizziness R42    Costochondritis M94.0    Small bowel obstruction (HCC) K56.609    Chronic obstructive pulmonary disease ([facility]) J44.9    UIP (usual interstitial pneumonitis) ([facility]) J84.112    Acute exacerbation of bronchiectasis ([facility] [address] J47.1       Isolation/Infection:   Isolation            No Isolation          Patient Infection Status       Infection Onset Added Last Indicated Last Indicated By Review Planned Expiration Resolved Resolved By    None active    Resolved    COVID-19 Rule Out 04/08/21 04/08/21 04/08/21 COVID-19, Rapid (Ordered)   04/08/21 Rule-Out Test Resulted    COVID-19 Rule Out 02/18/21 02/18/21 02/18/21 COVID-19 (Ordered)   02/19/21 Rule-Out Test Resulted    COVID-19 Rule Out 11/25/20 11/26/20 11/25/20 Covid-19 Ambulatory (Ordered)   11/29/20 Rule-Out Test Resulted    COVID-19 Rule Out 10/16/20 10/16/20 10/16/20 COVID-19 (Ordered)   10/16/20 Rule-Out Test Resulted            Nurse Assessment:  Last Vital Signs: BP (!) 101/51   Pulse 68   Temp 97.9 °F (36.6 °C) (Oral)   Resp 16   Ht 5' 7\" (1.702 m)   Wt 200 lb 2.8 oz (90.8 kg)   SpO2 95%   BMI 31.35 kg/m²     Last documented pain score (0-10 scale): Pain Level: 9  Last Weight:   Wt Readings from Last 1 Encounters:   04/11/21 200 lb 2.8 oz (90.8 kg)     Mental Status:  {IP PT MENTAL STATUS::::0}    IV Access:  { LESLEY IV ACCESS:286137603:::0}    Nursing Mobility/ADLs:  Walking   {CHP DME ADLs:968032262:::0}  Transfer  {CHP DME ADLs:787695469:::0}  Bathing  {CHP DME ADLs:035827531:::0}  Dressing  {CHP DME ADLs:988492505:::0}  Toileting  {CHP DME ADLs:170196866:::0}  Feeding  {CHP DME ADLs:628028534:::0}  Med Admin  {CHP DME ADLs:014039687:::0}  Med Delivery   { LESLEY MED Delivery:663076353:::0}    Wound Care Documentation and Therapy:        Elimination:  Continence: Bowel: {YES / YN:70021}  Bladder: {YES / UP:09413}  Urinary Catheter: {Urinary Catheter:175564839:::0}   Colostomy/Ileostomy/Ileal Conduit: {YES / ZI:01211}       Date of Last BM: ***    Intake/Output Summary (Last 24 hours) at 2021 1617  Last data filed at 2021 1257  Gross per 24 hour   Intake 480 ml   Output --   Net 480 ml     I/O last 3 completed shifts:   In: 18 [P.O.:480]  Out: -     Safety Concerns:     [address] Safety Concerns:983057487:::0}    Impairments/Disabilities:      [address] Impairments/Disabilities:451998701:::0}    Nutrition Therapy:  Current Nutrition Therapy:   [address] Diet List:984219698:::0}    Routes of Feeding: {CHP DME Other Feedings:523763509:::0}  Liquids: {Slp liquid thickness:89338}  Daily Fluid Restriction: {CHP DME Yes amt example:530993400:::0}  Last Modified Barium Swallow with Video (Video Swallowing Test): {Done Not Done TCRH:747272285:::6}    Treatments at the Time of Hospital Discharge:   Respiratory Treatments: ***  Oxygen Therapy:  {Therapy; copd oxygen:18372:::0}  Ventilator:    { CC Vent List:543431653:::0}    Rehab Therapies: {THERAPEUTIC INTERVENTION:9993607855}  Weight Bearing Status/Restrictions: [address]  Weight Bearin:::0}  Other Medical Equipment (for information only, NOT a DME order):  {EQUIPMENT:538014471}  Other Treatments: ***    Patient's personal belongings (please select all that are sent with patient):  {CHP DME Belongings:206342606:::0}    RN SIGNATURE:  {Esignature:520385727:::0}    CASE MANAGEMENT/SOCIAL WORK SECTION    Inpatient Status Date: ***    Readmission Risk Assessment Score:  Readmission Risk              Risk of Unplanned Readmission:        13           Discharging to Facility/ Agency   Name:   Address:  Phone:  Fax:    Dialysis Facility (if applicable)   Name:  Address:  Dialysis Schedule:  Phone:  Fax:    / signature: {Esignature:329806612:::0}    PHYSICIAN SECTION    Prognosis: {Prognosis:5163366138:::0}    Condition at Discharge: [address] [place] [address] Patient Condition:945398435:::0}    Rehab Potential (if transferring to Rehab): {Prognosis:6089449912:::0}    Recommended Labs or Other Treatments After Discharge: ***    Physician Certification: I certify the above information and transfer of Luis Memos  is necessary for the continuing treatment of the diagnosis listed and that she requires {Admit to Appropriate Level of Care:19243:::0} for {GREATER/LESS:624313101} 30 days.      Update Admission H&P: {CHP DME Changes in HandP:919750898:::0}    PHYSICIAN SIGNATURE:  Electronically signed by Maryellen Ly MD on 4/11/21 at 4:17 PM EDT

## 2021-04-11 NOTE — CARE COORDINATION
ONGOING DISCHARGE PLAN:    Patient is alert and oriented x4. Spoke with patient regarding discharge plan and patient confirms that plan is still to go hoe with no VNS. Patient qualified for Home Oxygen. Patient to discharge home today    Will continue to follow for additional discharge needs.     Electronically signed by Pedro Denver, RN on 4/11/2021 at 5:14 PM

## 2021-04-11 NOTE — CARE COORDINATION
Writer faxed DME order for home oxygen to  [facility], Writer called [facility] and spoke to on call rep, The after hours  was made aware of need for oxygen and will call and advised will it will be delivered.      Electronically signed by Padmini Cordova RN on 4/11/2021 at 5:03 PM

## 2021-04-11 NOTE — PROGRESS NOTES
%Home Oxygen Evaluation    Room air SpO2 at Rest = 95%    Room air with exercise/exertion = 87%    SpO2 on prescribed O2 level at  2LPM  at rest = 96%    2LPM  with exercise/exertion = 95%    Pt does qualify for home oxygen at this time prescribed at 2LPm

## 2021-04-11 NOTE — CARE COORDINATION
[facility] [address] Encounter Date/Time: 2021 [address] Account: [de-identified]    MRN: [id number]    Patient: Anneliese Goins    Contact Serial #: [id number]      ENCOUNTER          Patient Class: I Private Enc? No Unit RM BD: [facility] PROG    Hospital Service: Intermediate   Encounter DX: Community acquired pneum*   ADM Provider: Rhina Tavares MD   Procedure:     ATT Provider: Rhina Tavares MD   REF Provider:        Admission DX: Community acquired pneumonia, bilateral and codes:       PATIENT                 Name: Anneliese Goins : 1955 (77 yrs)   Address: [place] [address] Sex: Female   [place] city: [address] [place] [address]         Marital Status:    Employer: RETIRED         Hinduism: [address]   Primary Care Provider: Lazara Emmanuel MD         Primary Phone: [phone]   EMERGENCY CONTACT   Contact Name Legal Guardian? Relationship to Patient Home Phone Work Phone   1. Roger Mena  2. Mikaela Shanks      Spouse  Other [phone]              GUARANTOR            Guarantor: Anneliese Goins     : 1955   Address: [address] [place] [address] Sex: Female     [place] [address]     Relation to Patient: Self       Home Phone: [phone]   Guarantor ID: [id number]       Work Phone: [phone]   Guarantor Employer: RETIRED         Status: DISABLED      COVERAGE        PRIMARY INSURANCE   Payor: [facility] MEDICARE Plan: [facility] MEDICARE COMPLETE   Payor Address: ,          Group Number: [id number] Insurance Type: INDEMNITY   Subscriber Name: Dakota Goodrich : 1955   Subscriber ID: [id number] Pat. Rel. to Sub: Self   SECONDARY INSURANCE   Payor:   Plan:     Payor Address:  ,           Group Number:   Insurance Type:     Subscriber Name:   Subscriber :     Subscriber ID:   Pat.  Rel. to Sub:           [id number]        CSN                                    Req/Control # Steven Fuentes retrieving Specimen ID]                                   Order Date:  2021  [id number]     Patient Information      Name:  Romeo Jenkins  :  1955  Age:  77 y.o. Address:  [address] [place], [place] [address], [place]   Zip:  [address]  PCP: Leanna Gimenez MD Sex:  F  SSN: [id number]  Home Phone: [phone]  Work Phone:  [phone]  Patient MRN:  [id number]    Alt Patient ID:  [id number]  PCP Phone: [phone]       Authorizing Provider Information       AUTHORIZING PROVIDER: Kathleen Dotson MD  Physician ID: [id number]  NPI:  [id number]  Site:   Address: [address] [place] [address] Zip: [address]  Phone: [phone]  Fax: [phone]              EQUIPMENT ORDERED  DME Order for Home Oxygen as OP [[id number]] (ORD   #:   [id number]) Priority  Routine Class  Hospital Performed        Associated Diagnosis:          Comments:    You must complete the order parameters below and add the medical necessity documentation for this DME in a separate note.     Stationary Oxygen Concentrator at 2 lpm via Nasal Cannula     Stationary Prescribed at:  Non Continuous while walking or exercise     Portable Gaseous O2 System and contents at 2 lpm via Nasal Cannula     3-4hrs/day     Diagnosis: pulmonary fibrosis   Hypoxic resp failure chronic  Length of need: Lifetime            Scheduling Instructions:                                 Specimen Source             Collection Date    Collection Time    Order Status    Expected Date                Electronically Signed By  Kathleen Dotson MD Date  2021           Responsible Party [place] Information     Guar-ActID   Relationship Account Type Home Phone   Jasmin Washington [id number]* [address] [address] [address] Self P/F [phone]   Employer   Work Phone   RETIRED   [address] [facility] [employer]     Primary Insurance  Insurance/Subscriber ID:  [id number]  [address] Name:  Lady Barahona              Relationship to Patient: SelfSigned ABN: N    Payor Name:  [facility] MEDICARE   Plan:  [facility] MEDICARE COMPLETE   Group: [id number]  Worker's Comp Date of Injury:               Akin Gibbs RCP   Respiratory Therapist   Specialty:  Respiratory Therapy   Progress Notes   Signed   Date of Service:  2021  4:07 PM               Signed             Show:Clear all  [x]Manual[x]Template[]Copied    Added by:  [x]Meseret Humphries RCP    []Alaina for details  %Home Oxygen Evaluation     Room air SpO2 at Rest = 95%     Room air with exercise/exertion = 87%     SpO2 on prescribed O2 level at  2LPM  at rest = 96%    2LPM  with exercise/exertion = 95%     Pt does qualify for home oxygen at this time prescribed at 2LPm              Yas De Paz MD   Physician   Pulmonology   Progress Notes   Addendum   Date of Service:  2021  4:02 PM               Expand AllCollapse All      Show:Clear all  [x]Manual[x]Template[]Copied    Added by:  [x]Miguel Elizondo MD    []Alaina for details                                 Pulmonary Progress Note  [facility]        Patient - Lexis Ardon,  Age - 77 y.o.    - 1955      Room Number - 2111/2111-01   [facility] -  [id number]   [facility]  Date of Admission -  2021  2:29 PM           Saturnino Mckeon MD  Primary Care Physician - Jens Elmore MD      SUBJECTIVE   She looks comfortable, wants to go home     OBJECTIVE   VITALS    height is 5' 7\" (1.702 m) and weight is 200 lb 2.8 oz (90.8 kg). Her oral temperature is 97.9 °F (36.6 °C). Her blood pressure is 101/51 (abnormal) and her pulse is 68. Her respiration is 16 and oxygen saturation is 95%. Body mass index is 31.35 kg/m².   Temperature Range: Temp: 97.9 °F (36.6 °C) Temp  Av °F (36.7 °C)  Min: 97.7 °F (36.5 °C)  Max: 98.6 °F (37 °C)  BP Range:  Systolic (97AZO), ERD:539 , Min:101 , WEQ:687     Diastolic (08THW), UXV:35, Min:47, Max:52     Pulse Range: Pulse  Av.5  Min: 65  Max: 75  Respiration Range: Resp  Av.8  Min: 16  Max: 18  Current Pulse Ox[de-identified]  SpO2: 95 %  24HR Pulse Ox Range:  SpO2  Av.5 %  Min: 93 %  Max: 95 %  Oxygen Amount and Delivery:           Wt Readings from Last 3 Encounters:   21 200 lb 2.8 oz (90.8 kg)   21 202 lb 12.8 oz (92 kg)   21 200 lb (90.7 kg)         I/O (24 Hours)     Intake/Output Summary (Last 24 hours) at 2021 1602  Last data filed at 2021 1257      Gross per 24 hour   Intake 480 ml   Output --   Net 480 ml         EXAM      General Appearance  Awake, alert, oriented, in no acute distress  HEENT - normocephalic, atraumatic. []?  Mallampati  []? Crowded airway   []? Macroglossia          []? Retrognathia  []? Micrognathia  []? Normal tongue size           []? Normal Bite  []?  Millard sign positive     Neck - Supple,  trachea midline   Lungs -bilateral Velcro rales  Cardiovascular - Heart sounds are normal.  Regular rate and rhythm   Abdomen - Soft, nontender, nondistended, no masses or organomegaly  Neurologic - There are no focal motor or sensory deficits  Skin - No bruising or bleeding  Extremities - No clubbing, cyanosis, edema     MEDS      Scheduled Medications    aspirin  81 mg Oral Daily    oyster shell calcium/vitamin D  500 mg Oral Daily    FLUoxetine  10 mg Oral BID    traZODone  100 mg Oral Nightly    vitamin C  1,000 mg Oral BID    pregabalin  150 mg Oral BID    sodium chloride flush  5-40 mL Intravenous 2 times per day    enoxaparin  40 mg Subcutaneous Daily    cefTRIAXone (ROCEPHIN) IV  1,000 mg Intravenous Q24H    azithromycin  500 mg Oral Daily    guaiFENesin  600 mg Oral BID         Infusions Meds    sodium chloride           PRN Medications   levalbuterol, sodium chloride nebulizer, levalbuterol, meclizine, oxyCODONE-acetaminophen, sodium chloride flush, sodium chloride, acetaminophen, promethazine **OR** ondansetron, magnesium hydroxide, polyvinyl alcohol        LABS   CBC       Recent Labs     21  1347 WBC 7.9   HGB 11.9*   HCT 36.1   MCV 94.2         BMP:         Lab Results   Component Value Date      04/11/2021     K 4.2 04/11/2021      04/11/2021     CO2 26 04/11/2021     BUN 8 04/11/2021     LABALBU 3.9 04/08/2021     LABALBU 4.3 11/07/2011     CREATININE 0.69 04/11/2021     CALCIUM 8.8 04/11/2021     GFRAA >60 04/11/2021     LABGLOM >60 04/11/2021      ABGs:No results found for: PHART, PO2ART, RQE4VOW No results found for: IFIO2, MODE, SETTIDVOL, SETPEEP  Ionized Calcium:  No results found for: IONCA  Magnesium:          Lab Results   Component Value Date     MG 2.0 09/28/2019      Phosphorus:  No results found for: PHOS      LIVER PROFILE No results for input(s): AST, ALT, LIPASE, BILIDIR, BILITOT, ALKPHOS in the last 72 hours.     Invalid input(s): AMYLASE,  ALB  INR No results for input(s): INR in the last 72 hours. PTT No results found for: APTT        RADIOLOGY      (See actual reports for details)     ASSESSMENT/PLAN   Principal Problem:    Acute exacerbation of bronchiectasis (HCC)  Active Problems:    Dizziness    UIP (usual interstitial pneumonitis) (HCC)  Resolved Problems:    * No resolved hospital problems. *     Will plan on doing home oxygen evaluation with exercise  Outpatient pulmonary function testing  Do not feel that she needs any antibiotics at the time of discharge  Follow-up in office in 4 weeks we will need to decide a plan of action for her IPF     Electronically signed by Yas De Paz MD on 4/11/2021 at 4:02 PM     Addendum: Patient did qualify for oxygen therapy she will need 2 L with exertion and at nighttime.  A face-to-face discussion was done on the benefits of oxygen therapy and she is agreeable

## 2021-04-15 ENCOUNTER — TELEPHONE (OUTPATIENT)
Dept: FAMILY MEDICINE CLINIC | Age: 66
End: 2021-04-15

## 2021-04-15 DIAGNOSIS — J47.9 BRONCHIECTASIS WITHOUT COMPLICATION (HCC): Primary | ICD-10-CM

## 2021-04-16 DIAGNOSIS — S33.8XXS SACRUM SPRAIN, SEQUELA: ICD-10-CM

## 2021-04-16 DIAGNOSIS — S33.9XXS SPRAIN OF LIGAMENT OF LUMBOSACRAL JOINT, SEQUELA: ICD-10-CM

## 2021-04-16 DIAGNOSIS — M51.26 DISPLACEMENT OF INTERVERTEBRAL DISC OF LUMBAR REGION: ICD-10-CM

## 2021-04-16 DIAGNOSIS — M51.26 DISPLACEMENT OF LUMBAR INTERVERTEBRAL DISC WITHOUT MYELOPATHY: ICD-10-CM

## 2021-04-16 DIAGNOSIS — S39.012S STRAIN OF LUMBAR REGION, SEQUELA: ICD-10-CM

## 2021-04-16 RX ORDER — TRAZODONE HYDROCHLORIDE 100 MG/1
TABLET ORAL
Qty: 30 TABLET | Refills: 3 | Status: SHIPPED | OUTPATIENT
Start: 2021-04-16 | End: 2021-08-23

## 2021-04-20 ENCOUNTER — APPOINTMENT (OUTPATIENT)
Dept: GENERAL RADIOLOGY | Age: 66
DRG: 177 | End: 2021-04-20
Payer: MEDICARE

## 2021-04-20 ENCOUNTER — HOSPITAL ENCOUNTER (INPATIENT)
Age: 66
LOS: 1 days | Discharge: HOME | DRG: 177 | End: 2021-04-22
Attending: EMERGENCY MEDICINE | Admitting: INTERNAL MEDICINE
Payer: MEDICARE

## 2021-04-20 DIAGNOSIS — J12.82 PNEUMONIA DUE TO COVID-19 VIRUS: Primary | ICD-10-CM

## 2021-04-20 DIAGNOSIS — U07.1 PNEUMONIA DUE TO COVID-19 VIRUS: Primary | ICD-10-CM

## 2021-04-20 DIAGNOSIS — J47.9 BRONCHIECTASIS WITHOUT COMPLICATION (HCC): ICD-10-CM

## 2021-04-20 PROCEDURE — 82805 BLOOD GASES W/O2 SATURATION: CPT

## 2021-04-20 PROCEDURE — XW033E5 INTRODUCTION OF REMDESIVIR ANTI-INFECTIVE INTO PERIPHERAL VEIN, PERCUTANEOUS APPROACH, NEW TECHNOLOGY GROUP 5: ICD-10-PCS | Performed by: INTERNAL MEDICINE

## 2021-04-20 PROCEDURE — 80053 COMPREHEN METABOLIC PANEL: CPT

## 2021-04-20 PROCEDURE — 83880 ASSAY OF NATRIURETIC PEPTIDE: CPT

## 2021-04-20 PROCEDURE — 87635 SARS-COV-2 COVID-19 AMP PRB: CPT

## 2021-04-20 PROCEDURE — 85025 COMPLETE CBC W/AUTO DIFF WBC: CPT

## 2021-04-20 PROCEDURE — 99285 EMERGENCY DEPT VISIT HI MDM: CPT

## 2021-04-20 PROCEDURE — 36415 COLL VENOUS BLD VENIPUNCTURE: CPT

## 2021-04-20 PROCEDURE — 83735 ASSAY OF MAGNESIUM: CPT

## 2021-04-20 PROCEDURE — 93005 ELECTROCARDIOGRAM TRACING: CPT | Performed by: EMERGENCY MEDICINE

## 2021-04-20 PROCEDURE — 84484 ASSAY OF TROPONIN QUANT: CPT

## 2021-04-20 PROCEDURE — 71045 X-RAY EXAM CHEST 1 VIEW: CPT

## 2021-04-21 ENCOUNTER — APPOINTMENT (OUTPATIENT)
Dept: CT IMAGING | Age: 66
DRG: 177 | End: 2021-04-21
Payer: MEDICARE

## 2021-04-21 ENCOUNTER — TELEPHONE (OUTPATIENT)
Dept: FAMILY MEDICINE CLINIC | Age: 66
End: 2021-04-21

## 2021-04-21 PROBLEM — J12.82 PNEUMONIA DUE TO COVID-19 VIRUS: Status: ACTIVE | Noted: 2021-04-21

## 2021-04-21 PROBLEM — U07.1 PNEUMONIA DUE TO COVID-19 VIRUS: Status: ACTIVE | Noted: 2021-04-21

## 2021-04-21 PROBLEM — R53.1 GENERALIZED WEAKNESS: Status: ACTIVE | Noted: 2021-04-21

## 2021-04-21 LAB
ABSOLUTE EOS #: 0 K/UL (ref 0–0.4)
ABSOLUTE IMMATURE GRANULOCYTE: ABNORMAL K/UL (ref 0–0.3)
ABSOLUTE LYMPH #: 1.16 K/UL (ref 1–4.8)
ABSOLUTE MONO #: 0.68 K/UL (ref 0.1–1.3)
ALBUMIN SERPL-MCNC: 3.5 G/DL (ref 3.5–5.2)
ALBUMIN/GLOBULIN RATIO: ABNORMAL (ref 1–2.5)
ALLEN TEST: ABNORMAL
ALP BLD-CCNC: 76 U/L (ref 35–104)
ALT SERPL-CCNC: 16 U/L (ref 5–33)
ANION GAP SERPL CALCULATED.3IONS-SCNC: 15 MMOL/L (ref 9–17)
AST SERPL-CCNC: 31 U/L
BASOPHILS # BLD: 1 % (ref 0–2)
BASOPHILS ABSOLUTE: 0.07 K/UL (ref 0–0.2)
BILIRUB SERPL-MCNC: 0.28 MG/DL (ref 0.3–1.2)
BNP INTERPRETATION: NORMAL
BUN BLDV-MCNC: 10 MG/DL (ref 8–23)
BUN/CREAT BLD: ABNORMAL (ref 9–20)
C-REACTIVE PROTEIN: 83.1 MG/L (ref 0–5)
CALCIUM SERPL-MCNC: 8.7 MG/DL (ref 8.6–10.4)
CARBOXYHEMOGLOBIN: 2 % (ref 0–5)
CHLORIDE BLD-SCNC: 99 MMOL/L (ref 98–107)
CO2: 21 MMOL/L (ref 20–31)
CREAT SERPL-MCNC: 0.58 MG/DL (ref 0.5–0.9)
D-DIMER QUANTITATIVE: 1.7 MG/L FEU (ref 0–0.59)
DIFFERENTIAL TYPE: ABNORMAL
EKG ATRIAL RATE: 78 BPM
EKG P-R INTERVAL: 144 MS
EKG Q-T INTERVAL: 406 MS
EKG QRS DURATION: 86 MS
EKG QTC CALCULATION (BAZETT): 462 MS
EKG R AXIS: 69 DEGREES
EKG T AXIS: 26 DEGREES
EKG VENTRICULAR RATE: 78 BPM
EOSINOPHILS RELATIVE PERCENT: 0 % (ref 0–4)
FERRITIN: 318 UG/L (ref 13–150)
FIO2: ABNORMAL
GFR AFRICAN AMERICAN: >60 ML/MIN
GFR NON-AFRICAN AMERICAN: >60 ML/MIN
GFR SERPL CREATININE-BSD FRML MDRD: ABNORMAL ML/MIN/{1.73_M2}
GFR SERPL CREATININE-BSD FRML MDRD: ABNORMAL ML/MIN/{1.73_M2}
GLUCOSE BLD-MCNC: 101 MG/DL (ref 70–99)
HCO3 VENOUS: 25.1 MMOL/L (ref 24–30)
HCT VFR BLD CALC: 36 % (ref 36–46)
HEMOGLOBIN: 12.2 G/DL (ref 12–16)
IMMATURE GRANULOCYTES: ABNORMAL %
LYMPHOCYTES # BLD: 17 % (ref 24–44)
MAGNESIUM: 1.8 MG/DL (ref 1.6–2.6)
MCH RBC QN AUTO: 31.2 PG (ref 26–34)
MCHC RBC AUTO-ENTMCNC: 33.9 G/DL (ref 31–37)
MCV RBC AUTO: 91.9 FL (ref 80–100)
METHEMOGLOBIN: 0.3 % (ref 0–1.9)
MODE: ABNORMAL
MONOCYTES # BLD: 10 % (ref 1–7)
MORPHOLOGY: NORMAL
NEGATIVE BASE EXCESS, VEN: ABNORMAL MMOL/L (ref 0–2)
NOTIFICATION TIME: ABNORMAL
NOTIFICATION: ABNORMAL
NRBC AUTOMATED: ABNORMAL PER 100 WBC
O2 DEVICE/FLOW/%: ABNORMAL
O2 SAT, VEN: 75.9 % (ref 60–85)
OXYHEMOGLOBIN: ABNORMAL % (ref 95–98)
PATIENT TEMP: 37
PCO2, VEN, TEMP ADJ: ABNORMAL MMHG (ref 39–55)
PCO2, VEN: 26.2 (ref 39–55)
PDW BLD-RTO: 13.4 % (ref 11.5–14.9)
PEEP/CPAP: ABNORMAL
PH VENOUS: 7.59 (ref 7.32–7.42)
PH, VEN, TEMP ADJ: ABNORMAL (ref 7.32–7.42)
PLATELET # BLD: 226 K/UL (ref 150–450)
PLATELET ESTIMATE: ABNORMAL
PMV BLD AUTO: 9.5 FL (ref 6–12)
PO2, VEN, TEMP ADJ: ABNORMAL MMHG (ref 30–50)
PO2, VEN: 34.1 (ref 30–50)
POSITIVE BASE EXCESS, VEN: 3.4 MMOL/L (ref 0–2)
POTASSIUM SERPL-SCNC: 4 MMOL/L (ref 3.7–5.3)
PRO-BNP: 113 PG/ML
PROCALCITONIN: 0.09 NG/ML
PSV: ABNORMAL
PT. POSITION: ABNORMAL
RBC # BLD: 3.91 M/UL (ref 4–5.2)
RBC # BLD: ABNORMAL 10*6/UL
RESPIRATORY RATE: ABNORMAL
SAMPLE SITE: ABNORMAL
SARS-COV-2, RAPID: DETECTED
SEG NEUTROPHILS: 72 % (ref 36–66)
SEGMENTED NEUTROPHILS ABSOLUTE COUNT: 4.89 K/UL (ref 1.3–9.1)
SET RATE: ABNORMAL
SODIUM BLD-SCNC: 135 MMOL/L (ref 135–144)
SPECIMEN DESCRIPTION: ABNORMAL
TEXT FOR RESPIRATORY: ABNORMAL
TOTAL HB: ABNORMAL G/DL (ref 12–16)
TOTAL PROTEIN: 7.1 G/DL (ref 6.4–8.3)
TOTAL RATE: ABNORMAL
TROPONIN INTERP: NORMAL
TROPONIN INTERP: NORMAL
TROPONIN T: NORMAL NG/ML
TROPONIN T: NORMAL NG/ML
TROPONIN, HIGH SENSITIVITY: <6 NG/L (ref 0–14)
TROPONIN, HIGH SENSITIVITY: <6 NG/L (ref 0–14)
VT: ABNORMAL
WBC # BLD: 6.8 K/UL (ref 3.5–11)
WBC # BLD: ABNORMAL 10*3/UL

## 2021-04-21 PROCEDURE — 2580000003 HC RX 258: Performed by: EMERGENCY MEDICINE

## 2021-04-21 PROCEDURE — 85379 FIBRIN DEGRADATION QUANT: CPT

## 2021-04-21 PROCEDURE — G0378 HOSPITAL OBSERVATION PER HR: HCPCS

## 2021-04-21 PROCEDURE — 2580000003 HC RX 258: Performed by: NURSE PRACTITIONER

## 2021-04-21 PROCEDURE — 36415 COLL VENOUS BLD VENIPUNCTURE: CPT

## 2021-04-21 PROCEDURE — 97530 THERAPEUTIC ACTIVITIES: CPT

## 2021-04-21 PROCEDURE — 71260 CT THORAX DX C+: CPT

## 2021-04-21 PROCEDURE — 6370000000 HC RX 637 (ALT 250 FOR IP): Performed by: NURSE PRACTITIONER

## 2021-04-21 PROCEDURE — 82728 ASSAY OF FERRITIN: CPT

## 2021-04-21 PROCEDURE — 96372 THER/PROPH/DIAG INJ SC/IM: CPT

## 2021-04-21 PROCEDURE — 99223 1ST HOSP IP/OBS HIGH 75: CPT | Performed by: INTERNAL MEDICINE

## 2021-04-21 PROCEDURE — 6360000002 HC RX W HCPCS: Performed by: EMERGENCY MEDICINE

## 2021-04-21 PROCEDURE — 97166 OT EVAL MOD COMPLEX 45 MIN: CPT

## 2021-04-21 PROCEDURE — 96374 THER/PROPH/DIAG INJ IV PUSH: CPT

## 2021-04-21 PROCEDURE — 82800 BLOOD PH: CPT

## 2021-04-21 PROCEDURE — 93010 ELECTROCARDIOGRAM REPORT: CPT | Performed by: INTERNAL MEDICINE

## 2021-04-21 PROCEDURE — 84145 PROCALCITONIN (PCT): CPT

## 2021-04-21 PROCEDURE — 97116 GAIT TRAINING THERAPY: CPT

## 2021-04-21 PROCEDURE — 2060000000 HC ICU INTERMEDIATE R&B

## 2021-04-21 PROCEDURE — 97162 PT EVAL MOD COMPLEX 30 MIN: CPT

## 2021-04-21 PROCEDURE — 84484 ASSAY OF TROPONIN QUANT: CPT

## 2021-04-21 PROCEDURE — 6360000002 HC RX W HCPCS: Performed by: NURSE PRACTITIONER

## 2021-04-21 PROCEDURE — 86140 C-REACTIVE PROTEIN: CPT

## 2021-04-21 PROCEDURE — 6360000004 HC RX CONTRAST MEDICATION: Performed by: EMERGENCY MEDICINE

## 2021-04-21 RX ORDER — POLYETHYLENE GLYCOL 3350 17 G/17G
17 POWDER, FOR SOLUTION ORAL DAILY PRN
Status: DISCONTINUED | OUTPATIENT
Start: 2021-04-21 | End: 2021-04-22 | Stop reason: HOSPADM

## 2021-04-21 RX ORDER — ASCORBIC ACID 500 MG
1000 TABLET ORAL 2 TIMES DAILY
Status: DISCONTINUED | OUTPATIENT
Start: 2021-04-21 | End: 2021-04-22 | Stop reason: HOSPADM

## 2021-04-21 RX ORDER — GUAIFENESIN 600 MG/1
600 TABLET, EXTENDED RELEASE ORAL 2 TIMES DAILY
Status: DISCONTINUED | OUTPATIENT
Start: 2021-04-21 | End: 2021-04-22 | Stop reason: HOSPADM

## 2021-04-21 RX ORDER — SODIUM CHLORIDE 0.9 % (FLUSH) 0.9 %
5-40 SYRINGE (ML) INJECTION EVERY 12 HOURS SCHEDULED
Status: DISCONTINUED | OUTPATIENT
Start: 2021-04-21 | End: 2021-04-22 | Stop reason: HOSPADM

## 2021-04-21 RX ORDER — DEXAMETHASONE 6 MG/1
6 TABLET ORAL DAILY
Status: DISCONTINUED | OUTPATIENT
Start: 2021-04-21 | End: 2021-04-22 | Stop reason: HOSPADM

## 2021-04-21 RX ORDER — 0.9 % SODIUM CHLORIDE 0.9 %
80 INTRAVENOUS SOLUTION INTRAVENOUS ONCE
Status: COMPLETED | OUTPATIENT
Start: 2021-04-21 | End: 2021-04-21

## 2021-04-21 RX ORDER — MAGNESIUM SULFATE 1 G/100ML
1000 INJECTION INTRAVENOUS PRN
Status: DISCONTINUED | OUTPATIENT
Start: 2021-04-21 | End: 2021-04-22 | Stop reason: HOSPADM

## 2021-04-21 RX ORDER — MECLIZINE HYDROCHLORIDE 25 MG/1
25 TABLET ORAL 2 TIMES DAILY PRN
Status: DISCONTINUED | OUTPATIENT
Start: 2021-04-21 | End: 2021-04-22 | Stop reason: HOSPADM

## 2021-04-21 RX ORDER — ACETAMINOPHEN 325 MG/1
650 TABLET ORAL EVERY 6 HOURS PRN
Status: DISCONTINUED | OUTPATIENT
Start: 2021-04-21 | End: 2021-04-22 | Stop reason: HOSPADM

## 2021-04-21 RX ORDER — PROMETHAZINE HYDROCHLORIDE 25 MG/1
12.5 TABLET ORAL EVERY 6 HOURS PRN
Status: DISCONTINUED | OUTPATIENT
Start: 2021-04-21 | End: 2021-04-22 | Stop reason: HOSPADM

## 2021-04-21 RX ORDER — POTASSIUM CHLORIDE 20 MEQ/1
40 TABLET, EXTENDED RELEASE ORAL PRN
Status: DISCONTINUED | OUTPATIENT
Start: 2021-04-21 | End: 2021-04-22 | Stop reason: HOSPADM

## 2021-04-21 RX ORDER — SODIUM CHLORIDE 9 MG/ML
25 INJECTION, SOLUTION INTRAVENOUS PRN
Status: DISCONTINUED | OUTPATIENT
Start: 2021-04-21 | End: 2021-04-22 | Stop reason: HOSPADM

## 2021-04-21 RX ORDER — ACETAMINOPHEN 650 MG/1
650 SUPPOSITORY RECTAL EVERY 6 HOURS PRN
Status: DISCONTINUED | OUTPATIENT
Start: 2021-04-21 | End: 2021-04-22 | Stop reason: HOSPADM

## 2021-04-21 RX ORDER — DEXTROMETHORPHAN POLISTIREX 30 MG/5ML
60 SUSPENSION ORAL EVERY 12 HOURS SCHEDULED
Status: DISCONTINUED | OUTPATIENT
Start: 2021-04-21 | End: 2021-04-22 | Stop reason: HOSPADM

## 2021-04-21 RX ORDER — BENZONATATE 100 MG/1
100 CAPSULE ORAL 3 TIMES DAILY PRN
Status: DISCONTINUED | OUTPATIENT
Start: 2021-04-21 | End: 2021-04-22 | Stop reason: HOSPADM

## 2021-04-21 RX ORDER — ASPIRIN 81 MG/1
81 TABLET, CHEWABLE ORAL 2 TIMES DAILY
Status: DISCONTINUED | OUTPATIENT
Start: 2021-04-21 | End: 2021-04-22 | Stop reason: HOSPADM

## 2021-04-21 RX ORDER — PREGABALIN 150 MG/1
150 CAPSULE ORAL 2 TIMES DAILY
Status: DISCONTINUED | OUTPATIENT
Start: 2021-04-21 | End: 2021-04-22 | Stop reason: HOSPADM

## 2021-04-21 RX ORDER — SODIUM CHLORIDE 0.9 % (FLUSH) 0.9 %
10 SYRINGE (ML) INJECTION PRN
Status: DISCONTINUED | OUTPATIENT
Start: 2021-04-21 | End: 2021-04-22 | Stop reason: HOSPADM

## 2021-04-21 RX ORDER — OXYCODONE HYDROCHLORIDE AND ACETAMINOPHEN 5; 325 MG/1; MG/1
1 TABLET ORAL 2 TIMES DAILY PRN
Status: DISCONTINUED | OUTPATIENT
Start: 2021-04-21 | End: 2021-04-22 | Stop reason: HOSPADM

## 2021-04-21 RX ORDER — TRAZODONE HYDROCHLORIDE 100 MG/1
100 TABLET ORAL NIGHTLY
Status: DISCONTINUED | OUTPATIENT
Start: 2021-04-21 | End: 2021-04-22 | Stop reason: HOSPADM

## 2021-04-21 RX ORDER — FLUOXETINE 10 MG/1
10 CAPSULE ORAL 2 TIMES DAILY
Status: DISCONTINUED | OUTPATIENT
Start: 2021-04-21 | End: 2021-04-22 | Stop reason: HOSPADM

## 2021-04-21 RX ORDER — POTASSIUM CHLORIDE 7.45 MG/ML
10 INJECTION INTRAVENOUS PRN
Status: DISCONTINUED | OUTPATIENT
Start: 2021-04-21 | End: 2021-04-22 | Stop reason: HOSPADM

## 2021-04-21 RX ORDER — DEXAMETHASONE SODIUM PHOSPHATE 10 MG/ML
6 INJECTION, SOLUTION INTRAMUSCULAR; INTRAVENOUS ONCE
Status: COMPLETED | OUTPATIENT
Start: 2021-04-21 | End: 2021-04-21

## 2021-04-21 RX ORDER — ONDANSETRON 2 MG/ML
4 INJECTION INTRAMUSCULAR; INTRAVENOUS EVERY 6 HOURS PRN
Status: DISCONTINUED | OUTPATIENT
Start: 2021-04-21 | End: 2021-04-22 | Stop reason: HOSPADM

## 2021-04-21 RX ADMIN — GUAIFENESIN 600 MG: 600 TABLET, EXTENDED RELEASE ORAL at 08:53

## 2021-04-21 RX ADMIN — MECLIZINE HYDROCHLORIDE 25 MG: 25 TABLET ORAL at 21:32

## 2021-04-21 RX ADMIN — ASPIRIN 81 MG: 81 TABLET, CHEWABLE ORAL at 08:53

## 2021-04-21 RX ADMIN — Medication 10 ML: at 21:32

## 2021-04-21 RX ADMIN — CALCIUM CARBONATE 600 MG (1,500 MG)-VITAMIN D3 400 UNIT TABLET 1 TABLET: at 21:32

## 2021-04-21 RX ADMIN — Medication 10 ML: at 08:55

## 2021-04-21 RX ADMIN — OXYCODONE HYDROCHLORIDE AND ACETAMINOPHEN 1000 MG: 500 TABLET ORAL at 08:53

## 2021-04-21 RX ADMIN — TRAZODONE HYDROCHLORIDE 100 MG: 100 TABLET ORAL at 21:32

## 2021-04-21 RX ADMIN — SODIUM CHLORIDE 80 ML: 9 INJECTION, SOLUTION INTRAVENOUS at 04:35

## 2021-04-21 RX ADMIN — GUAIFENESIN 600 MG: 600 TABLET, EXTENDED RELEASE ORAL at 21:33

## 2021-04-21 RX ADMIN — SODIUM CHLORIDE, PRESERVATIVE FREE 10 ML: 5 INJECTION INTRAVENOUS at 04:35

## 2021-04-21 RX ADMIN — FLUOXETINE 10 MG: 10 CAPSULE ORAL at 08:54

## 2021-04-21 RX ADMIN — OXYCODONE HYDROCHLORIDE AND ACETAMINOPHEN 1 TABLET: 5; 325 TABLET ORAL at 21:32

## 2021-04-21 RX ADMIN — FLUOXETINE 10 MG: 10 CAPSULE ORAL at 21:33

## 2021-04-21 RX ADMIN — DEXAMETHASONE SODIUM PHOSPHATE 6 MG: 10 INJECTION, SOLUTION INTRAMUSCULAR; INTRAVENOUS at 04:53

## 2021-04-21 RX ADMIN — DEXAMETHASONE 6 MG: 6 TABLET ORAL at 08:53

## 2021-04-21 RX ADMIN — PREGABALIN 150 MG: 150 CAPSULE ORAL at 08:54

## 2021-04-21 RX ADMIN — ENOXAPARIN SODIUM 40 MG: 40 INJECTION SUBCUTANEOUS at 08:55

## 2021-04-21 RX ADMIN — PREGABALIN 150 MG: 150 CAPSULE ORAL at 21:31

## 2021-04-21 RX ADMIN — Medication 60 MG: at 08:55

## 2021-04-21 RX ADMIN — IOPAMIDOL 75 ML: 755 INJECTION, SOLUTION INTRAVENOUS at 04:35

## 2021-04-21 RX ADMIN — OXYCODONE HYDROCHLORIDE AND ACETAMINOPHEN 1000 MG: 500 TABLET ORAL at 21:31

## 2021-04-21 RX ADMIN — CALCIUM CARBONATE 600 MG (1,500 MG)-VITAMIN D3 400 UNIT TABLET 1 TABLET: at 08:54

## 2021-04-21 RX ADMIN — ASPIRIN 81 MG: 81 TABLET, CHEWABLE ORAL at 21:32

## 2021-04-21 RX ADMIN — Medication 60 MG: at 21:31

## 2021-04-21 RX ADMIN — OXYCODONE HYDROCHLORIDE AND ACETAMINOPHEN 1 TABLET: 5; 325 TABLET ORAL at 08:54

## 2021-04-21 ASSESSMENT — PAIN SCALES - GENERAL
PAINLEVEL_OUTOF10: 10
PAINLEVEL_OUTOF10: 9
PAINLEVEL_OUTOF10: 9
PAINLEVEL_OUTOF10: 8
PAINLEVEL_OUTOF10: 9

## 2021-04-21 ASSESSMENT — PAIN DESCRIPTION - PROGRESSION
CLINICAL_PROGRESSION: NOT CHANGED

## 2021-04-21 ASSESSMENT — ENCOUNTER SYMPTOMS
VOMITING: 0
SORE THROAT: 0
BACK PAIN: 0
ABDOMINAL PAIN: 0
SHORTNESS OF BREATH: 1
NAUSEA: 0
DIARRHEA: 0
WHEEZING: 0
CONSTIPATION: 0
COUGH: 1

## 2021-04-21 ASSESSMENT — PAIN DESCRIPTION - ONSET: ONSET: ON-GOING

## 2021-04-21 ASSESSMENT — PAIN DESCRIPTION - ORIENTATION
ORIENTATION: LOWER

## 2021-04-21 ASSESSMENT — PAIN DESCRIPTION - DESCRIPTORS
DESCRIPTORS: ACHING
DESCRIPTORS: ACHING
DESCRIPTORS: ACHING;DISCOMFORT
DESCRIPTORS: ACHING

## 2021-04-21 ASSESSMENT — PAIN DESCRIPTION - FREQUENCY
FREQUENCY: CONTINUOUS

## 2021-04-21 ASSESSMENT — PAIN DESCRIPTION - LOCATION
LOCATION: BACK

## 2021-04-21 ASSESSMENT — PAIN DESCRIPTION - PAIN TYPE
TYPE: CHRONIC PAIN

## 2021-04-21 ASSESSMENT — PAIN - FUNCTIONAL ASSESSMENT: PAIN_FUNCTIONAL_ASSESSMENT: PREVENTS OR INTERFERES SOME ACTIVE ACTIVITIES AND ADLS

## 2021-04-21 NOTE — ED PROVIDER NOTES
[place] 89      Pt Name: Anneliese Goins  MRN: [id number]  [place] 1955  Date of evaluation: 4/21/21      CHIEF COMPLAINT     Shortness of breath and weakness    HISTORY OF PRESENT ILLNESS   HPI 77 y.o. female presents with c/o shortness of breath and weakness. Patient is brought to the emergency department by EMS. Patient reports that she was recently admitted to the hospital and treated for bronchiectasis. She states that she was discharged home on 11 April. She reports that since she got home, she has been getting progressively weak and short of breath. She rates her symptoms as severe in severity, constant in course. She states that her  is also ill and has not been able to take care of her. REVIEW OF SYSTEMS     Review of Systems   Constitutional: Positive for activity change, appetite change and fatigue. HENT: Negative for congestion. Eyes: Negative for visual disturbance. Respiratory: Positive for cough and shortness of breath. Cardiovascular: Negative for chest pain. Gastrointestinal: Negative for nausea and vomiting. Genitourinary: Negative for dysuria. Musculoskeletal: Negative for back pain. Skin: Negative for wound. Neurological: Positive for weakness (Generalized).        PAST MEDICAL HISTORY     Past Medical History:   Diagnosis Date    Arthritis     Depression     Displacement of intervertebral disc, site unspecified, without myelopathy     [facility]    Displacement of lumbar intervertebral disc without myelopathy     [facility]    Hyperlipidemia     Lumbago     [facility]    Sprain of lumbar region     [facility]    Sprain of lumbosacral (joint) (ligament)     [facility]    Sprain of sacrum     [facility]       SURGICAL HISTORY       Past Surgical History:   Procedure Laterality Date    ABDOMINAL EXPLORATION SURGERY      BACK SURGERY      X2    COLON SURGERY      COLONOSCOPY      COLONOSCOPY N/A 11/30/2020    COLONOSCOPY POLYPECTOMY HOT SNARE performed by Carlos Alexander MD at [address] Right     HAND SURGERY Right 6/18/14    OVARY REMOVAL Bilateral     WRIST ARTHROPLASTY Left        CURRENT MEDICATIONS       Current Discharge Medication List      CONTINUE these medications which have NOT CHANGED    Details   traZODone (DESYREL) 100 MG tablet 1 tab at night  Qty: 30 tablet, Refills: 3    Associated Diagnoses: Strain of lumbar region, sequela; Displacement of lumbar intervertebral disc without myelopathy; Sacrum sprain, sequela; Displacement of intervertebral disc of lumbar region; Sprain of ligament of lumbosacral joint, sequela      guaiFENesin (MUCINEX) 600 MG extended release tablet Take 1 tablet by mouth 2 times daily  Qty: 60 tablet, Refills: 0      oxyCODONE-acetaminophen (PERCOCET) 5-325 MG per tablet Take 1 tablet by mouth 2 times daily as needed for Pain. FLUoxetine (PROZAC) 10 MG capsule Take 1 capsule by mouth 2 times daily  Qty: 60 capsule, Refills: 5    Associated Diagnoses: Positive depression screening; Generalized anxiety disorder      meclizine (ANTIVERT) 25 MG tablet Take 1 tablet by mouth twice daily as needed  Qty: 60 tablet, Refills: 0      pregabalin (LYRICA) 150 MG capsule Take 150 mg by mouth 2 times daily. vitamin C (ASCORBIC ACID) 500 MG tablet Take 1,000 mg by mouth 2 times daily      Calcium Carbonate-Vitamin D (OSCAL 500/200 D-3 PO) Take 500 mg by mouth 2 times daily       aspirin (ASPIRIN CHILDRENS) 81 MG chewable tablet Take 1 tablet by mouth daily  Qty: 30 tablet, Refills: 0             ALLERGIES     is allergic to Jose Armando Schein tartrate]; ativan [lorazepam]; darvon [propoxyphene]; restoril [temazepam]; and topamax [topiramate]. FAMILY HISTORY     She indicated that the status of her father is unknown. SOCIAL HISTORY      reports that she quit smoking about 19 months ago. Her smoking use included cigarettes.  She has a 3.75 pack-year smoking history. She has never used smokeless tobacco. She reports current alcohol use of about 2.0 standard drinks of alcohol per week. She reports that she does not use drugs. PHYSICAL EXAM     INITIAL VITALS: BP (!) 128/58   Pulse 57   Temp 97.4 °F (36.3 °C) (Oral)   Resp 18   Ht 5' 7\" (1.702 m)   Wt 193 lb 12.6 oz (87.9 kg)   SpO2 96%   BMI 30.35 kg/m²   Gen: Peers weak and fatigued  Head: Normocephalic, atraumatic  Eye: Pupils equal round reactive to light, no conjunctivitis  ENT: Dry oral mucosa  Neck: No JVD  Heart: Regular rate and rhythm no murmurs  Lungs: Bibasilar Rales, tachypnea  Chest wall: No crepitus, no tenderness palpation  Abdomen: Soft, nontender, nondistended, with no peritoneal signs  Neurologic: Patient is alert and oriented x3, motor and sensation is intact in all 4 extremities, fluent speech  Extremities: No edema, no calf tenderness to palpation    MEDICAL DECISION MAKING:     MDM  77 y.o. female presenting with shortness of breath and fatigue. Differential diagnosis is Covid, pneumonia, CHF, atypical presentation of ACS, anemia, renal failure electrolyte abnormalities. Obtaining laboratory studies, EKG, imaging studies, treating with nasal cannula oxygen. We will monitor closely and reassess. Emergency Department course:    COVID +   Troponin normal  BNP Normal  No anemia. CT shows no large mainstem or segmental pulmonary embolism. Treating with decadron. Admitting to hospital.   Discussed with internal medicine service. DIAGNOSTIC RESULTS     EKG: All EKG's are interpreted by the Emergency Department Physician who either signs or Co-signs this chart in the absence of a cardiologist.    EKG shows a sinus rhythm.   HR is 78, , QRS 86, , no RAULITO, No STD, No TWI, the axis is normal.        RADIOLOGY:All plain film, CT, MRI, and formal ultrasound images (except ED bedside ultrasound) are read by the radiologist and the images and interpretations are directly PROTIME-INR   BASIC METABOLIC PANEL W/ REFLEX TO MG FOR LOW K   PREALBUMIN       EMERGENCY DEPARTMENT COURSE:   Vitals:    Vitals:    04/21/21 1353 04/21/21 1354 04/21/21 2009 04/22/21 0247   BP:   (!) 123/57 (!) 128/58   Pulse:   71 57   Resp:   18 18   Temp:   97.9 °F (36.6 °C) 97.4 °F (36.3 °C)   TempSrc:   Oral Oral   SpO2: 95% 95% 94% 96%   Weight:       Height:           The patient was given the following medications while in the emergency department:  Orders Placed This Encounter   Medications    iopamidol (ISOVUE-370) 76 % injection 75 mL    0.9 % sodium chloride bolus    sodium chloride flush 0.9 % injection 10 mL    dexamethasone (PF) (DECADRON) injection 6 mg    sodium chloride flush 0.9 % injection 5-40 mL    sodium chloride flush 0.9 % injection 10 mL    0.9 % sodium chloride infusion    OR Linked Order Group     potassium chloride (KLOR-CON M) extended release tablet 40 mEq     potassium bicarb-citric acid (EFFER-K) effervescent tablet 40 mEq     potassium chloride 10 mEq/100 mL IVPB (Peripheral Line)    magnesium sulfate 1000 mg in dextrose 5% 100 mL IVPB    enoxaparin (LOVENOX) injection 40 mg    OR Linked Order Group     promethazine (PHENERGAN) tablet 12.5 mg     ondansetron (ZOFRAN) injection 4 mg    polyethylene glycol (GLYCOLAX) packet 17 g    OR Linked Order Group     acetaminophen (TYLENOL) tablet 650 mg     acetaminophen (TYLENOL) suppository 650 mg    dexamethasone (DECADRON) tablet 6 mg    aspirin chewable tablet 81 mg    calcium carbonate-vitamin D (CALTRATE) 600-400 MG-UNIT per tab 1 tablet    FLUoxetine (PROZAC) capsule 10 mg    guaiFENesin (MUCINEX) extended release tablet 600 mg    meclizine (ANTIVERT) tablet 25 mg    pregabalin (LYRICA) capsule 150 mg    traZODone (DESYREL) tablet 100 mg    ascorbic acid (VITAMIN C) tablet 1,000 mg    oxyCODONE-acetaminophen (PERCOCET) 5-325 MG per tablet 1 tablet    benzonatate (TESSALON) capsule 100 mg    dextromethorphan (DELSYM) 30 MG/5ML extended release liquid 60 mg     -------------------------  CRITICAL CARE:   CONSULTS: IP CONSULT TO PULMONOLOGY  IP CONSULT TO SOCIAL WORK  IP CONSULT TO DIETITIAN  PROCEDURES: Procedures     FINAL IMPRESSION      1. Pneumonia due to COVID-19 virus          DISPOSITION/PLAN   DISPOSITION Admitted 04/21/2021 04:34:30 AM      PATIENT REFERRED TO:  No follow-up provider specified.     DISCHARGE MEDICATIONS:  Current Discharge Medication List            Rocco Ganser, MD  Attending Emergency Physician                      Rocco Ganser, MD  04/22/21 9211

## 2021-04-21 NOTE — H&P
[address] [facility]     HISTORY AND PHYSICAL EXAMINATION            Date:   4/21/2021  Patientname:  Frida Mendoza  Date of admission:  4/20/2021 10:45 PM  MRN:   [id number]  Account:  [de-identified]  YOB: 1955  PCP:    Shamika Aguilar MD  Room:   BENITO/BENITO  Code Status:    Prior    CHIEF COMPLAINT     No chief complaint on file. HISTORY OF PRESENT ILLNESS  (Character, Onset, Location, Duration,  Exacerbating/RelievingFactors, Radiation,   Associated Symptoms, Severity )      The patient is a 77 y.o.  female, with a history of bronchiectasis, COPD, Costochondritis, obesity, and Usual interstitial pneumonitis, who presents with shortness of breath. According to patient, she was admitted to this facility 4/9 through 4/11 for bronchiectasis and has continued to worsen since that time. Patient reports that she has become so weak that she does not feel safe being at home.  is reportedly sick also, and unable to help patient care for self. Symptoms are associated with increased cough with occasional clear sputum production, headache, and generalized weakness. Denies fever, chills, chest pain, abdominal pain, nausea, vomiting, diarrhea, and urinary symptoms. There are no aggravating or alleviating factors. Symptoms are reported as constant and moderate to severe. PAST MEDICAL HISTORY   Patient  has a past medical history of Arthritis, Depression, Displacement of intervertebral disc, site unspecified, without myelopathy, Displacement of lumbar intervertebral disc without myelopathy, Hyperlipidemia, Lumbago, Sprain of lumbar region, Sprain of lumbosacral (joint) (ligament), and Sprain of sacrum. PAST SURGICAL HISTORY    Patient  has a past surgical history that includes Ovary removal (Bilateral); Wrist Arthroplasty (Left); Dilation and curettage of uterus; Abdominal exploration surgery; Colon surgery; back surgery; Colonoscopy; Foot surgery (Right);  Hand surgery (Right, 6/18/14); and Colonoscopy (N/A, 11/30/2020). FAMILY HISTORY    Patient family history includes Heart Disease in her father; High Blood Pressure in her father. SOCIAL HISTORY    Patient  reports that she quit smoking about 19 months ago. Her smoking use included cigarettes. She has a 3.75 pack-year smoking history. She has never used smokeless tobacco. She reports current alcohol use of about 2.0 standard drinks of alcohol per week. She reports that she does not use drugs. HOME MEDICATIONS        Prior to Admission medications    Medication Sig Start Date End Date Taking? Authorizing Provider   traZODone (DESYREL) 100 MG tablet 1 tab at night 4/16/21  Yes Manuelito Tadeo MD   guaiFENesin (MUCINEX) 600 MG extended release tablet Take 1 tablet by mouth 2 times daily 4/11/21  Yes Sajan Rodriguez MD   oxyCODONE-acetaminophen (PERCOCET) 5-325 MG per tablet Take 1 tablet by mouth 2 times daily as needed for Pain. Yes Historical Provider, MD   FLUoxetine (PROZAC) 10 MG capsule Take 1 capsule by mouth 2 times daily 4/5/21  Yes Manuelito Tadeo MD   meclizine (ANTIVERT) 25 MG tablet Take 1 tablet by mouth twice daily as needed 1/15/21  Yes Manuelito Tadeo MD   pregabalin (LYRICA) 150 MG capsule Take 150 mg by mouth 2 times daily.     Yes Historical Provider, MD   vitamin C (ASCORBIC ACID) 500 MG tablet Take 1,000 mg by mouth 2 times daily   Yes Historical Provider, MD   Calcium Carbonate-Vitamin D (OSCAL 500/200 D-3 PO) Take 500 mg by mouth 2 times daily    Yes Historical Provider, MD   aspirin (ASPIRIN CHILDRENS) 81 MG chewable tablet Take 1 tablet by mouth daily  Patient taking differently: Take 81 mg by mouth 2 times daily  4/5/17  Yes Rosalia Aldana MD       ALLERGIES      Ambien [zolpidem tartrate], Ativan [lorazepam], Darvon [propoxyphene], Restoril [temazepam], and Topamax [topiramate]    REVIEW OF SYSTEMS     Review of Systems   Constitutional: Positive for activity change, appetite tenderness. Lymphadenopathy:      Cervical: No cervical adenopathy. Skin:     General: Skin is warm and dry. Coloration: Skin is not pale. Findings: No erythema or rash. Neurological:      Mental Status: She is alert and oriented to person, place, and time. Motor: No seizure activity. Coordination: Coordination normal.   Psychiatric:         Behavior: Behavior normal.         Thought Content: Thought content normal.       DIAGNOSTICS      EKG:   EKG 12 Lead [[id number]]    Collected: 04/20/21 2316    Updated: 04/20/21 2318     Ventricular Rate 78 BPM    Atrial Rate 78 BPM    P-R Interval 144 ms    QRS Duration 86 ms    Q-T Interval 406 ms    QTc Calculation (Bazett) 462 ms    R Axis 69 degrees    T Axis 26 degrees   Narrative:     Normal sinus rhythm   Normal ECG   When compared with ECG of 08-APR-2021 15:38,   No significant change was found     Labs:    Recent Labs     04/20/21 0030 04/21/21  0030 04/21/21  0141 04/21/21  0200   CRP  --  83.1*  --   --    WBC 6.8  --   --   --    LYMPHOPCT 17*  --   --   --    TROPHS <6  --   --  <6   DDIMER  --   --  1.70*  --        Recent Labs     04/20/21 0030   COVID19 DETECTED*       CBC:   Recent Labs     04/20/21 0030   WBC 6.8   HGB 12.2        BMP:    Recent Labs     04/20/21 0030      K 4.0   CL 99   CO2 21   BUN 10   CREATININE 0.58   GLUCOSE 101*     S. Calcium:  Recent Labs     04/20/21 0030   CALCIUM 8.7     S. Ionized Calcium:No results for input(s): IONCA in the last 72 hours. S. Magnesium:  Recent Labs     04/20/21  0030   MG 1.8     S. Phosphorus:No results for input(s): PHOS in the last 72 hours. S. Glucose:No results for input(s): POCGLU in the last 72 hours.   Glycosylated hemoglobin A1C:   Lab Results   Component Value Date    LABA1C 5.4 12/14/2020     Hepatic:   Recent Labs     04/20/21  0030   AST 31   ALT 16   ALKPHOS 76     CARDIAC ENZY:   Recent Labs     04/20/21 0030 04/21/21  0200   TROPHS <6 <6     INR: No results for input(s): INR in the last 72 hours. BNP:   Recent Labs     04/20/21  0030   PROBNP 113      ABGs: No results for input(s): PH, PCO2, PO2, HCO3, O2SAT in the last 72 hours. Lipids: No results for input(s): CHOL, TRIG, HDL, LDLCALC in the last 72 hours. Invalid input(s): LDL  Pancreatic functions:No results for input(s): LIPASE, AMYLASE in the last 72 hours. Maryetta Reveal: No results for input(s): LACTA in the last 72 hours. Thyroid functions:   Lab Results   Component Value Date    TSH 2.70 08/20/2019      U/A:No results for input(s): NITRITE, COLORU, WBCUA, RBCUA, MUCUS, BACTERIA, CLARITYU, SPECGRAV, LEUKOCYTESUR, BLOODU, GLUCOSEU, AMORPHOUS in the last 72 hours. Invalid input(s): Varghese Patten    Imaging/Diagonstics:     Xr Knee Left (3 Views)    Result Date: 4/8/2021  EXAMINATION: THREE XRAY VIEWS OF THE LEFT KNEE 4/8/2021 2:49 pm COMPARISON: Left knee radiographs performed 06/03/2008. HISTORY: ORDERING SYSTEM PROVIDED HISTORY: fall TECHNOLOGIST PROVIDED HISTORY: fall Reason for Exam: PT CO Pain in multiple sites of her body including her chest, Left knee, and Left foot after falling yesterday. Acuity: Acute Type of Exam: Initial FINDINGS: There is no acute osseous abnormality. The joint spaces are maintained. There is no joint effusion. The periarticular soft tissues are unremarkable. No acute osseous or soft tissue abnormality. Xr Foot Left (min 3 Views)    Result Date: 4/8/2021  EXAMINATION: THREE XRAY VIEWS OF THE LEFT FOOT 4/8/2021 2:49 pm COMPARISON: None. HISTORY: ORDERING SYSTEM PROVIDED HISTORY: fall TECHNOLOGIST PROVIDED HISTORY: fall Reason for Exam: PT CO Pain in multiple sites of her body including her chest, Left knee, and Left foot after falling yesterday. Acuity: Acute Type of Exam: Initial FINDINGS: There is no acute osseous abnormality. The joint spaces are maintained. There is a calcaneal spur at the plantar fascial attachment.   The surrounding soft tissues are unremarkable. No acute osseous or soft tissue abnormality. Ct Head Wo Contrast    Result Date: 4/8/2021  EXAMINATION: CT OF THE HEAD WITHOUT CONTRAST  4/8/2021 2:05 pm TECHNIQUE: CT of the head was performed without the administration of intravenous contrast. Dose modulation, iterative reconstruction, and/or weight based adjustment of the mA/kV was utilized to reduce the radiation dose to as low as reasonably achievable. COMPARISON: 09/28/2019. HISTORY: ORDERING SYSTEM PROVIDED HISTORY: vertigo, fall TECHNOLOGIST PROVIDED HISTORY: vertigo, fall Decision Support Exception->Emergency Medical Condition (MA) Reason for Exam: Dizziness; Fall Acuity: Acute Type of Exam: Initial Mechanism of Injury: pt states became dizzy and fell hitting top of head Relevant Medical/Surgical History: no surgery to area of interest FINDINGS: BRAIN/VENTRICLES: There is no acute intracranial hemorrhage, mass effect or midline shift. No abnormal extra-axial fluid collection. The gray-white differentiation is maintained without evidence of an acute infarct. There is no evidence of hydrocephalus. A 1 cm focus of small vessel infarction or gliosis from prior ischemic event was again noted in the left external capsule. It is unchanged from 09/28/2019. ORBITS: The visualized portion of the orbits demonstrate no acute abnormality. SINUSES: The visualized paranasal sinuses and mastoid air cells demonstrate no acute abnormality. SOFT TISSUES/SKULL:  No acute abnormality of the visualized skull or soft tissues. No acute intracranial abnormality. Old 1 cm focus of small vessel infarction or gliosis from prior ischemic event in the left external capsule. It is unchanged from 09/28/2019.      Xr Chest Portable    Result Date: 4/20/2021  EXAMINATION: ONE XRAY VIEW OF THE CHEST 4/20/2021 11:01 pm COMPARISON: 10/16/2020 HISTORY: ORDERING SYSTEM PROVIDED HISTORY: cp, sob TECHNOLOGIST PROVIDED HISTORY: cp, sob Reason for Exam: cp, sob Acuity: Acute Type of Exam: Initial Additional signs and symptoms: Chest pain, shortness of breath Relevant Medical/Surgical History: Chest pain, shortness of breath FINDINGS: Cardiomediastinal silhouette is normal in size. There is consolidation within the left mid and lower lung. Reticular changes in the right lung base are again noted. .  No large pleural effusion or pneumothorax. No acute osseous abnormality. Chronic interstitial findings with increasing left mid and lower lung consolidation, concerning for superimposed pneumonia. Xr Chest Portable    Result Date: 4/8/2021  EXAMINATION: ONE XRAY VIEW OF THE CHEST 4/8/2021 2:49 pm COMPARISON: October 16, 2020, chest exam, 2019 HISTORY: ORDERING SYSTEM PROVIDED HISTORY: cough, sob TECHNOLOGIST PROVIDED HISTORY: cough, sob Reason for Exam: PT CO Pain in multiple sites of her body including her chest, Left knee, and Left foot after falling yesterday. Acuity: Acute Type of Exam: Initial FINDINGS: Stable normal cardiopericardial silhouette Interval increase in prominence of bibasilar asymmetric, left greater than right, interstitial infiltrates. These infiltrates were vaguely present on the 2019 exam. No significant pleural or mediastinal findings     Interval increase in prominence of asymmetric bibasilar interstitial infiltrates. Findings likely related to mild interval progression in interstitial fibrosis No definite acute cardiopulmonary findings     Ct Chest High Resolution    Result Date: 4/9/2021  EXAMINATION: CT IMAGES OF THE CHEST WITHOUT CONTRAST, HIGH RESOLUTION 4/9/2021 TECHNIQUE: CT of the chest was performed without the administration of intravenous contrast. High resolution CT imaging was performed of the lungs. Multiplanar reformatted images are provided for review. Dose modulation, iterative reconstruction, and/or weight based adjustment of the mA/kV was utilized to reduce the radiation dose to as low as reasonably achievable.  High resolution CT images were performed in the supine inspiration, supine expiration, and prone inspiration positions. COMPARISON: None. HISTORY: ORDERING SYSTEM PROVIDED HISTORY: Please evaluate for UIP. No contrast please. Please do prone and supine positions. Thank you very much TECHNOLOGIST PROVIDED HISTORY: Please evaluate for UIP. No contrast please. Please do prone and supine positions. Thank you very much Reason for Exam: bilateral acquired pneumonia, dizziness, fall Acuity: Acute Type of Exam: Initial FINDINGS: Mediastinum: Calcified mediastinal and hilar lymph nodes are present. No ava mediastinal adenopathy or acute aortic abnormality is noted. Borderline cardiac size is noted without pericardial effusion or epicardial adenopathy. HRCT Findings/Lungs/pleura: A left upper lobe pulmonary nodule 4.4 mm is noted, image 9 series 2. Septal thickening and pleuroparenchymal scarring is present in the lingula with honeycomb type appearance. Peripheral honeycomb appearance is present in both lower lobes. Peripheral tree-in-bud appearance is present right upper lobe most significant in the inferior right upper lobe. Central bronchiectasis is noted with bilateral lower lobe bronchiectasis. No effusions or extrapleural air are noted. No air trapping is noted on the expiratory images. Diffuse moderate emphysematous changes are noted. Tracheobronchial tree is patent. Lower lobe ground-glass opacities are noted including a 9 mm opacity anterior aspect of the right lower lobe, image 80 series 4, and in the left lower lobe, of 6.2 mm, image 80 series 4. In summary, the patient has honeycomb lung destruction with basal and peripheral predominance, traction bronchiectasis with basal predominance and atypical nodules with ground-glass attenuation. Constellation of findings favors UIP/IPF. Upper Abdomen: Adrenals are unremarkable. No acute abnormality in the included upper abdomen.  Soft Tissues/Bones: No acute osseous or soft tissue abnormality. No ava axillary adenopathy. 1.  Honeycomb pattern with peripheral and basilar predominance. 2.  Traction bronchiectasis most significant in the lower lobes. 3.  Scattered nodules, many ground-glass. 4. Interstitial thickening and fibrosis. 5.  Constellation of findings favors UIP/IPF. RECOMMENDATIONS: Fleischner Society guidelines for follow-up and management of incidentally detected pulmonary nodules: Multiple Solid Nodules: . Nodule size greater than 8 mm In a low-risk patient, CT at 3-6 months, then consider CT at 18-24 months. In a high-risk patient, CT at 3-6 months, then CT at 18-24 months. - Low risk patients include individuals with minimal or absent history of smoking and other known risk factors. - High risk patients include individuals with a history or smoking or known risk factors. Radiology 2017 http://pubs. rsna.org/doi/full/10.1148/radiol. 6954634595     Ct Chest Pulmonary Embolism W Contrast    Result Date: 4/8/2021  EXAMINATION: CTA OF THE CHEST 4/8/2021 3:14 pm TECHNIQUE: CTA of the chest was performed after the administration of intravenous contrast.  Multiplanar reformatted images are provided for review. MIP images are provided for review. Dose modulation, iterative reconstruction, and/or weight based adjustment of the mA/kV was utilized to reduce the radiation dose to as low as reasonably achievable. COMPARISON: 10/16/2020 HISTORY: ORDERING SYSTEM PROVIDED HISTORY: dizzy, sob, cough. elevated ddimer. TECHNOLOGIST PROVIDED HISTORY: dizzy, sob, cough. elevated ddimer. Decision Support Exception->Emergency Medical Condition (MA) Reason for Exam: patient c/o midsternal chest discomfort, elevated d-dimer, cough Acuity: Acute Type of Exam: Initial FINDINGS: Pulmonary Arteries: Pulmonary arteries are adequately opacified for evaluation. No evidence of intraluminal filling defect to suggest pulmonary embolism. Main pulmonary artery is normal in caliber.  Mediastinum: There are shotty mediastinal and hilar lymph nodes. .  The heart and pericardium demonstrate no acute abnormality. There is no acute abnormality of the thoracic aorta. Coronary artery atherosclerosis. Lungs/pleura: There is no pleural effusion or pneumothorax. Emphysema with subpleural reticular changes, similar prior examination. There is new superimposed ground-glass and nodular opacity throughout both lungs. Upper Abdomen: Limited images of the upper abdomen demonstrate no acute abnormality. Soft Tissues/Bones: No acute bone or soft tissue abnormality. 1. No pulmonary embolus. 2. Ground-glass opacity within both lungs, concerning for multifocal pneumonia. 3. Emphysema and chronic subpleural reticular changes are similar to prior examination. RECOMMENDATIONS: Imaging features can be seen with viral pneumonia, though are nonspecific and can occur with a variety of infectious and noninfectious processes. PneInd Reference: https://pubs. rsna.org/doi/full/10.1148/ryct. 9659695927     CT CHEST PULMONARY EMBOLISM W CONTRAST [[id number]]    Collected: 04/21/21 0438    Updated: 04/21/21 0527    Impression:     1. No pulmonary embolus. 2. Interval appearance since April 9, 2021 of diffuse ground-glass opacities   compatible with infection superimposed on chronic interstitial lung disease. 3. Hiatal hernia. ASSESSMENT  and  PLAN     Principal Problem:    Pneumonia due to COVID-19 virus  Active Problems:    Obesity (BMI 30-39. 9)    Generalized weakness  Resolved Problems:    * No resolved hospital problems.  *    Plan:    Pneumonia d/t Covid -19  -Rapid Covid swab positive in ED  -CT chest -No pulmonary embolus  --Interval appearance of diffuse ground-glass opacities compatible with infection superimposed on chronic interstitial lung disease  -Decadron 6 mg IV in ED  --Continue on Decadron 6 mg po on admission  -Delsym BID  -Tessalon PRN  -Pulmonology consult  -Droplet Plus isolation precautions    Generalized weakness  -PT and OT eval and treat  -Fall precautions  -Dietitian consult  -Check prealbumin with am labs  -Social Service Consult for discharge planning    Consultations:     IP CONSULT TO PULMONOLOGY  IP CONSULT TO SOCIAL WORK      ABIEL Hinton - CNP   4/21/2021  5:58 AM    [address]   Joseph Ocampo [address] [place] [address], [address] [place] [address]. Phone [phone]     Attending Physician Statement  I have discussed the care of Gurwinder Pulling and I have examined the patient myselft and taken ros and hpi , including pertinent history and exam findings,  with the resident. I have reviewed the key elements of all parts of the encounter with the resident. I agree with the assessment, plan and orders as documented by the resident.       Electronically signed by Josafat Lantigua MD

## 2021-04-21 NOTE — PROGRESS NOTES
Comprehensive Nutrition Assessment    Type and Reason for Visit:  Initial, Positive Nutrition Screen, Consult(wt loss, supplement recommendations)    Nutrition Recommendations/Plan: Recommend continue General diet and add Ensure High Protein to all trays to increase intake. Nutrition Assessment:  Pt admitted due to [place]. She was here from 4/9 to 4/11. Pt is consuming small amounts. Malnutrition Assessment:  Malnutrition Status: At risk for malnutrition (Comment)    Context:  Acute Illness     Findings of the 6 clinical characteristics of malnutrition:  Energy Intake:  1 - 75% or less of estimated energy requirements for 7 or more days  Weight Loss:  Unable to assess(stated wts)     Body Fat Loss:  Unable to assess     Muscle Mass Loss:  Unable to assess    Fluid Accumulation:  No significant fluid accumulation     Strength:  Not Performed    Estimated Daily Nutrient Needs:  Energy (kcal):  20 kcal/kg= 1305-2194 kcal; Weight Used for Energy Requirements:  Admission     Protein (g):  1.4g/kg= 85 g protein; Weight Used for Protein Requirements:  Ideal          Nutrition Related Findings:  no edema, Labs/Meds: Reviewed, PMH: COPD      Wounds:  None       Current Nutrition Therapies:    DIET GENERAL; Anthropometric Measures:  · Height: 5' 7\" (170.2 cm)  · Current Body Weight: 193 lb (87.5 kg)   · Admission Body Weight: 193 lb (87.5 kg)    · Usual Body Weight: (195-202#)     · Ideal Body Weight: 135 lbs; BMI: 30.2  · BMI Categories: Obese Class 1 (BMI 30.0-34. 9)       Nutrition Diagnosis:   · Inadequate oral intake related to (poor appetite) as evidenced by poor intake prior to admission, weight loss    Nutrition Interventions:   Food and/or Nutrient Delivery:  Continue Current Diet, Start Oral Nutrition Supplement  Nutrition Education/Counseling:  No recommendation at this time   Coordination of Nutrition Care:  Continue to monitor while inpatient    Goals:  po intake greater than 50%       Nutrition Monitoring and Evaluation:   Food/Nutrient Intake Outcomes:  Food and Nutrient Intake, Supplement Intake  Physical Signs/Symptoms Outcomes:  Biochemical Data, GI Status, Skin, Weight, Fluid Status or Edema     Discharge Planning:    Continue current diet     Electronically signed by Sim Rivera RD, LD on 4/21/21 at 12:47 PM EDT    Contact: [phone]

## 2021-04-21 NOTE — PLAN OF CARE
Problem: Airway Clearance - Ineffective  Goal: Achieve or maintain patent airway  4/21/2021 1559 by Adriano Pack RN  Outcome: Ongoing     Problem: Gas Exchange - Impaired  Goal: Promote optimal lung function  4/21/2021 1559 by Adriano Pack RN  Outcome: Ongoing     Problem: Breathing Pattern - Ineffective  Goal: Ability to achieve and maintain a regular respiratory rate  4/21/2021 1559 by Adriano Pack RN  Outcome: Ongoing     Problem:  Body Temperature -  Risk of, Imbalanced  Goal: Ability to maintain a body temperature within defined limits  4/21/2021 1559 by Adriano Pack RN  Outcome: Ongoing

## 2021-04-21 NOTE — CARE COORDINATION
CASE MANAGEMENT NOTE:    Admission Date:  4/20/2021 An Oliva is a 77 y.o.  female    Admitted for : Pneumonia due to COVID-19 virus [U07.1, J12.82]    Met with:  Patient    PCP:  Dr. Nelson Necessary:  [facility]      Is patient alert and oriented at time of discussion:  Yes    Current Residence/ Living Arrangements:  independently at home             Current Services PTA:  No    Does patient go to outpatient dialysis: No  If yes, location and chair time:     Is patient agreeable to VNS: No    Freedom of choice provided:  No    List of [address] provided: No    VNS chosen:  No    DME:  walker    Home Oxygen: Yes    Nebulizer: Yes    CPAP/BIPAP: NA    Supplier: [facility]    Potential Assistance Needed: No    SNF needed: No    Freedom of choice and list provided: Yes    Pharmacy:  [facility]       Does Patient want to use MEDS to BEDS? No    Is patient currently receiving oral anticoagulation therapy? No    Is the Patient an [facility] with Readmission Risk Score greater than 14%? Yes  If yes, pt needs a follow up appointment made within 7 days. Family Members/Caregivers that pt would like involved in their care:    Yes    If yes, list name here:  Chuy Brunoligia    Transportation Provider:  Family             Discharge Plan:  4/21/21 [facility] Pt is from home in a two story home with her family she has a wheelchair, home ox and neb from [facility], Pt denies need for VNS plan is to discharge to home with no needs will continue to follow .//tv                 Electronically signed by:  Johnathan Howard RN on 4/21/2021 at 3:28 PM

## 2021-04-21 NOTE — PROGRESS NOTES
activities and ADLs  Non-Pharmaceutical Pain Intervention(s): Ambulation/Increased Activity; Distraction;Repositioned; Rest  Response to Pain Intervention: Patient Satisfied  Vital Signs  Patient Currently in Pain: Yes  Oxygen Therapy  SpO2: 95 %  O2 Device: Nasal cannula  O2 Flow Rate (L/min): 2 L/min  Patient Observation  Observations: O2 sats droppedt o 87-89% with activity on 2L O2 (2-3 minutes to recover with rest/pursed lip breathing to 90% and above)       Orientation  Orientation  Overall Orientation Status: Within Normal Limits  Social/Functional History  Social/Functional History  Lives With: Spouse  Type of Home: House  Home Layout: Two level, Bed/Bath upstairs, 1/2 bath on main level  Home Access: Stairs to enter with rails  Entrance Stairs - Number of Steps: 5 steps w/ 2 rails; 16 steps / left rail to ascend to 2nd floor bed & bath  Entrance Stairs - Rails: Both  Bathroom Shower/Tub: Tub/Shower unit, Curtain  Bathroom Toilet: Handicap height(standard 1st floor, handicap height 2nd floor)  Bathroom Equipment: Grab bars in shower, Hand-held shower  Bathroom Accessibility: Accessible  Home Equipment: Standard walker, Cane, Crutches, Long-handled shoehorn, Oxygen(2L O2 prn recently)  ADL Assistance: Needs assistance(was IND, needed some A upon recent d/c)  Homemaking Assistance: Needs assistance(spouse does cooking/shopping, share cleaning/laundry)  Homemaking Responsibilities: No  Ambulation Assistance: Independent(using standard walker)  Transfer Assistance: Independent  Active : Yes  Mode of Transportation: Car, Truck  Occupation: On disability, Retired  Type of occupation: grocery store  Leisure & Hobbies: 2 dogs  IADL Comments: sleeps in adjustable bed that vibrates  Additional Comments: Spouse is retired and normally able to assist 24/7 upon d/c but he is also sick. No recent home health.  Wearing O2 2L prn upon recent d/c from hospital.  Cognition        Objective          AROM RLE (degrees)  RLE AROM: WFL  AROM LLE (degrees)  LLE AROM : WFL  AROM RUE (degrees)  RUE General AROM: See OT  AROM LUE (degrees)  LUE General AROM: See OT  Strength RLE  Strength RLE: WFL  Comment: Grossly 4-/5  Strength LLE  Strength LLE: WFL  Comment: Grossly 4-/5  Strength RUE  Comment: See OT  Strength LUE  Comment: See OT     Sensation  Overall Sensation Status: WFL(pt denies)  Bed mobility  Rolling to Right: Stand by assistance  Supine to Sit: Stand by assistance  Sit to Supine: Unable to assess  Scooting: Stand by assistance  Comment: SBA with HOB elevated (adjustable bed at home). Pt up in chair at end of session. No difficulty to EOB. Transfers  Sit to Stand: Contact guard assistance  Stand to sit: Contact guard assistance  Bed to Chair: Contact guard assistance  Comment: CGA wtih use of RW, cues for technique. No reported dizziness. Ambulation  Ambulation?: Yes  Ambulation 1  Surface: level tile  Device: Rolling Walker  Other Apparatus: O2(2L)  Assistance: Contact guard assistance  Quality of Gait: slow karin, mild increased lateral sway, no LOB, steady gait  Gait Deviations: Slow Karin  Distance: 30'  Comments: 1 standing rest break. Pt O2 sats dropped to 89% with ambulation and increase to 90% and above with rest. Pt appears easily fatigued.   Stairs/Curb  Stairs?: No     Balance  Posture: Good  Sitting - Static: Good  Sitting - Dynamic: Good  Standing - Static: Good;-  Standing - Dynamic: Fair;+  Comments: Low fall risk, standing balance with RW        Plan   Plan  Times per week: 3-4x/week  Specific instructions for Next Treatment: progress gait/stairs, bring HEP to perform in room, check O2 sats  Current Treatment Recommendations: Strengthening, Balance Training, Functional Mobility Training, Transfer Training, Endurance Training, Gait Training, Stair training, Equipment Evaluation, Education, & procurement, Patient/Caregiver Education & Training, Safety Education & Training, Home Exercise Program, Positioning  Safety Devices  Type of devices: Call light within reach, All fall risk precautions in place, Gait belt, Patient at risk for falls, Left in chair, Nurse notified(RN Roselia ALVARADO)    G-Code       OutComes Score                                                  AM-PAC Score  AM-PAC Inpatient Mobility Raw Score : 16 (04/21/21 1353)  AM-PAC Inpatient T-Scale Score : 40.78 (04/21/21 1353)  Mobility Inpatient CMS 0-100% Score: 54.16 (04/21/21 1353)  Mobility Inpatient CMS G-Code Modifier : CK (04/21/21 1353)          Goals  Short term goals  Time Frame for Short term goals: 5 days  Short term goal 1: Pt to demo Mod I for bed mobility. Short term goal 2: Pt to demo Mod I for transfers with device. Short term goal 3: Pt to amb 50'-75' with ability to maintain O2 sats with device prn, SBA/CGA. Short term goal 4: Pt to ascend/descend 5 stairs with 1-2 UE support, SBA/CGA. Short term goal 5: Pt to demo good technique for HEP to improve strengthening/endurance while in room also. Patient Goals   Patient goals :  To feel better       Therapy Time   Individual Concurrent Group Co-treatment   Time In 1351         Time Out 1419         Minutes 28         Timed Code Treatment Minutes: 8 Minutes       Jennifer Dennison, PT

## 2021-04-21 NOTE — PROGRESS NOTES
[address] [facility]    Occupational Therapy Evaluation  Date: 21  Patient Name: Claribel Bacon       Room: /-92  MRN: [id number]  Account: [de-identified]   : 1955  (68 y.o.) Gender: female     Discharge Recommendations:  Further Occupational Therapy is recommended upon facility discharge. Equipment Needed: (TBD)    Referring Practitioner: ABIEL Fitzpatrick CNP  Diagnosis: Pneumonia due to COVID-19       Treatment Diagnosis: impaired self care status  Past Medical History:  has a past medical history of Arthritis, Depression, Displacement of intervertebral disc, site unspecified, without myelopathy, Displacement of lumbar intervertebral disc without myelopathy, Hyperlipidemia, Lumbago, Sprain of lumbar region, Sprain of lumbosacral (joint) (ligament), and Sprain of sacrum. Past Surgical History:   has a past surgical history that includes Ovary removal (Bilateral); Wrist Arthroplasty (Left); Dilation and curettage of uterus; Abdominal exploration surgery; Colon surgery; back surgery; Colonoscopy; Foot surgery (Right); Hand surgery (Right, 14); and Colonoscopy (N/A, 2020).     Restrictions  Restrictions/Precautions: General Precautions, Fall Risk, Contact Precautions, Isolation(+ COVID, DROPLET PLUS)  Implants present? : Metal implants(L wrist arthroplasty)  Other position/activity restrictions: up with assistance  Required Braces or Orthoses?: No(wears back brace for housework)     Vitals  Temp: 97.7 °F (36.5 °C)  Pulse: 75  Resp: 22  BP: (!) 120/54  Height: 5' 7\" (170.2 cm)  Weight: 193 lb 12.6 oz (87.9 kg)  BMI (Calculated): 30.4  Oxygen Therapy  SpO2: 95 %  Pulse Oximeter Device Mode: Intermittent  Pulse Oximeter Device Location: Finger  O2 Device: Nasal cannula  O2 Flow Rate (L/min): 2 L/min  Level of Consciousness: Alert (0)    Subjective  Subjective: Pt in bed upon arrival. Pt was pleasant and agreeable to OT/PT eval.   Comments: OK per LIBERTY Brennan for OT/PT eval  Overall Orientation Status: Within Functional Limits  Vision  Vision: Impaired  Vision Exceptions: Wears glasses at all times  Hearing  Hearing: Exceptions to [facility]  Hearing Exceptions: Hard of hearing/hearing concerns, No hearing aid  Social/Functional History  Lives With: Spouse  Type of Home: House  Home Layout: Two level, Bed/Bath upstairs, 1/2 bath on main level  Home Access: Stairs to enter with rails  Entrance Stairs - Number of Steps: 5 steps w/ 2 rails; 16 steps / left rail to ascend to 2nd floor bed & bath  Entrance Stairs - Rails: Both  Bathroom Shower/Tub: Tub/Shower unit, Curtain  Bathroom Toilet: Handicap height(standard 1st floor, handicap height 2nd floor)  Bathroom Equipment: Grab bars in shower, Hand-held shower  Bathroom Accessibility: Accessible  Home Equipment: Standard walker, Cane, Crutches, Long-handled shoehorn, Oxygen(2L O2 prn recently)  ADL Assistance: Needs assistance(was IND, needed some A upon recent d/c)  Homemaking Assistance: Needs assistance(spouse does cooking/shopping, share cleaning/laundry)  Homemaking Responsibilities: No  Ambulation Assistance: Independent(using standard walker)  Transfer Assistance: Independent  Active : Yes  Mode of Transportation: Car, Truck  Occupation: On disability, Retired  Type of occupation: grocery store  Leisure & Hobbies: 2 dogs  IADL Comments: sleeps in adjustable bed that vibrates  Additional Comments: Spouse is retired and normally able to assist 24/7 upon d/c but he is also sick. No recent home health.  Wearing O2 2L prn upon recent d/c from hospital.  Pain Assessment  Pain Assessment: 0-10  Pain Level: 9  Pain Type: Chronic pain  Pain Location: Back  Pain Orientation: Lower  Pain Descriptors: Aching  Pain Frequency: Continuous  Clinical Progression: Not changed  Response to Pain Intervention: Patient Satisfied    Objective          Sensation  Overall Sensation Status: WFL(pt denies)   ADL  Feeding: Independent  Grooming: Setup  UE report, pt has adjustable bed at home. Transfers  Sit to stand: Contact guard assistance  Stand to sit: Contact guard assistance  Transfer Comments: Verbal cues for hand placement      Assessment  Performance deficits / Impairments: Decreased ADL status, Decreased functional mobility , Decreased strength, Decreased endurance, Decreased balance, Decreased high-level IADLs  Treatment Diagnosis: impaired self care status  Prognosis: Good  Decision Making: Medium Complexity  REQUIRES OT FOLLOW UP: Yes  Discharge Recommendations: Patient would benefit from continued therapy after discharge, Continue to assess pending progress  Activity Tolerance: Patient limited by fatigue         Functional Outcome Measures  AM-PAC Daily Activity Inpatient   How much help for putting on and taking off regular lower body clothing?: A Little  How much help for Bathing?: A Little  How much help for Toileting?: A Little  How much help for putting on and taking off regular upper body clothing?: A Little  How much help for taking care of personal grooming?: A Little  How much help for eating meals?: None  [facility] Inpatient Daily Activity Raw Score: 19  AM-PAC Inpatient ADL T-Scale Score : 40.22  ADL Inpatient CMS 0-100% Score: 42.8  ADL Inpatient CMS G-Code Modifier : CK       Goals  Patient Goals   Patient goals :  To get stronger  Short term goals  Time Frame for Short term goals: By discharge  Short term goal 1: Pt will complete BADLs with modified independence with good safety while maintaining SpO2 above 90%  Short term goal 2: Pt will complete functional mobility and transfers during self care tasks with modified independence with good safety and use of least restrictive device while maintaining SpO2 above 90%  Short term goal 3: Pt will verbalize/demonstrate good understanding of energy conservation techniques to increase safety and independence with self care tasks  Short term goal 4: Pt will tolerate standing 5+ minutes during

## 2021-04-21 NOTE — PROGRESS NOTES
Pt arrived to floor via stretcher from ED and was transfered to bed. Pt placed on telemetry, vitals taken, admission and assessment to follow. Call light within reach, and pt educated on its use. Bed in lowest position, and locked. Side rails up x 2. Denied further questions or needs at this time.

## 2021-04-21 NOTE — ED NOTES
Mode of arrival (squad #, walk in, police, etc) : EMS        Chief complaint(s): SHORTNESS OF BREATH        Arrival Note (brief scenario, treatment PTA, etc). : STATES SHE IS HERE FOR SHORTNESS OF BREATH, STATES SHE WAS RECENTLY DISCHARGED FROM THIS HOSPITAL ON SUNDAY AND WAS IN  FOR PNEUMONIA. [place] Saab A/0 x 3.         C= \"Have you ever felt that you should Cut down on your drinking? \"  No  A= \"Have people Annoyed you by criticizing your drinking? \"  No  G= \"Have you ever felt bad or Guilty about your drinking? \"  No  E= \"Have you ever had a drink as an Eye-opener first thing in the morning to steady your nerves or to help a hangover? \"  No      Deferred []      Reason for deferring: N/A    *If yes to two or more: probable alcohol abuse. Mary Patel RN  04/21/21 6215

## 2021-04-21 NOTE — ED NOTES
Admission Dx: Pneumonia    Pts Chief Complaints on Arrival: Shortness of breath    ADL's - Partial assistance    Pending Diagnostics:  n/a    Residence PTA: single story home    Special Considerations/Circumstances:  n/a    Vitals: Current vital signs:  BP (!) 121/50   Pulse 78   Temp 98.9 °F (37.2 °C) (Oral)   Resp 26   Ht 5' 7\" (1.702 m)   Wt 190 lb (86.2 kg)   SpO2 97%   BMI 29.76 kg/m²                MEWS Score: Taylor Wilson 40, RN  04/21/21 5464

## 2021-04-22 ENCOUNTER — TELEPHONE (OUTPATIENT)
Dept: FAMILY MEDICINE CLINIC | Age: 66
End: 2021-04-22

## 2021-04-22 VITALS
RESPIRATION RATE: 18 BRPM | WEIGHT: 193.78 LBS | TEMPERATURE: 97.6 F | HEIGHT: 67 IN | SYSTOLIC BLOOD PRESSURE: 118 MMHG | BODY MASS INDEX: 30.42 KG/M2 | DIASTOLIC BLOOD PRESSURE: 49 MMHG | OXYGEN SATURATION: 96 % | HEART RATE: 65 BPM

## 2021-04-22 LAB
ANION GAP SERPL CALCULATED.3IONS-SCNC: 10 MMOL/L (ref 9–17)
BUN BLDV-MCNC: 13 MG/DL (ref 8–23)
BUN/CREAT BLD: ABNORMAL (ref 9–20)
CALCIUM SERPL-MCNC: 9.4 MG/DL (ref 8.6–10.4)
CHLORIDE BLD-SCNC: 104 MMOL/L (ref 98–107)
CO2: 26 MMOL/L (ref 20–31)
CREAT SERPL-MCNC: 0.61 MG/DL (ref 0.5–0.9)
GFR AFRICAN AMERICAN: >60 ML/MIN
GFR NON-AFRICAN AMERICAN: >60 ML/MIN
GFR SERPL CREATININE-BSD FRML MDRD: ABNORMAL ML/MIN/{1.73_M2}
GFR SERPL CREATININE-BSD FRML MDRD: ABNORMAL ML/MIN/{1.73_M2}
GLUCOSE BLD-MCNC: 155 MG/DL (ref 70–99)
HCT VFR BLD CALC: 39.7 % (ref 36–46)
HEMOGLOBIN: 13 G/DL (ref 12–16)
INR BLD: 0.9
MCH RBC QN AUTO: 30.8 PG (ref 26–34)
MCHC RBC AUTO-ENTMCNC: 32.7 G/DL (ref 31–37)
MCV RBC AUTO: 94.1 FL (ref 80–100)
NRBC AUTOMATED: NORMAL PER 100 WBC
PDW BLD-RTO: 13.4 % (ref 11.5–14.9)
PLATELET # BLD: 262 K/UL (ref 150–450)
PMV BLD AUTO: 9.7 FL (ref 6–12)
POTASSIUM SERPL-SCNC: 4.9 MMOL/L (ref 3.7–5.3)
PREALBUMIN: 9.4 MG/DL (ref 20–40)
PROTHROMBIN TIME: 12.4 SEC (ref 11.8–14.6)
RBC # BLD: 4.22 M/UL (ref 4–5.2)
SODIUM BLD-SCNC: 140 MMOL/L (ref 135–144)
WBC # BLD: 7.2 K/UL (ref 3.5–11)

## 2021-04-22 PROCEDURE — 36415 COLL VENOUS BLD VENIPUNCTURE: CPT

## 2021-04-22 PROCEDURE — 96372 THER/PROPH/DIAG INJ SC/IM: CPT

## 2021-04-22 PROCEDURE — 80048 BASIC METABOLIC PNL TOTAL CA: CPT

## 2021-04-22 PROCEDURE — G0378 HOSPITAL OBSERVATION PER HR: HCPCS

## 2021-04-22 PROCEDURE — 99239 HOSP IP/OBS DSCHRG MGMT >30: CPT | Performed by: INTERNAL MEDICINE

## 2021-04-22 PROCEDURE — 85027 COMPLETE CBC AUTOMATED: CPT

## 2021-04-22 PROCEDURE — 84134 ASSAY OF PREALBUMIN: CPT

## 2021-04-22 PROCEDURE — 2580000003 HC RX 258: Performed by: NURSE PRACTITIONER

## 2021-04-22 PROCEDURE — 85610 PROTHROMBIN TIME: CPT

## 2021-04-22 PROCEDURE — 6370000000 HC RX 637 (ALT 250 FOR IP): Performed by: NURSE PRACTITIONER

## 2021-04-22 PROCEDURE — 6360000002 HC RX W HCPCS: Performed by: NURSE PRACTITIONER

## 2021-04-22 RX ORDER — DEXAMETHASONE 6 MG/1
6 TABLET ORAL DAILY
Qty: 8 TABLET | Refills: 0 | Status: SHIPPED | OUTPATIENT
Start: 2021-04-23 | End: 2021-05-01

## 2021-04-22 RX ADMIN — FLUOXETINE 10 MG: 10 CAPSULE ORAL at 07:47

## 2021-04-22 RX ADMIN — Medication 10 ML: at 07:47

## 2021-04-22 RX ADMIN — ASPIRIN 81 MG: 81 TABLET, CHEWABLE ORAL at 07:46

## 2021-04-22 RX ADMIN — Medication 60 MG: at 07:47

## 2021-04-22 RX ADMIN — OXYCODONE HYDROCHLORIDE AND ACETAMINOPHEN 1 TABLET: 5; 325 TABLET ORAL at 07:46

## 2021-04-22 RX ADMIN — PREGABALIN 150 MG: 150 CAPSULE ORAL at 07:46

## 2021-04-22 RX ADMIN — CALCIUM CARBONATE 600 MG (1,500 MG)-VITAMIN D3 400 UNIT TABLET 1 TABLET: at 07:46

## 2021-04-22 RX ADMIN — BENZONATATE 100 MG: 100 CAPSULE ORAL at 07:46

## 2021-04-22 RX ADMIN — OXYCODONE HYDROCHLORIDE AND ACETAMINOPHEN 1000 MG: 500 TABLET ORAL at 07:46

## 2021-04-22 RX ADMIN — GUAIFENESIN 600 MG: 600 TABLET, EXTENDED RELEASE ORAL at 07:46

## 2021-04-22 RX ADMIN — DEXAMETHASONE 6 MG: 6 TABLET ORAL at 07:46

## 2021-04-22 RX ADMIN — ENOXAPARIN SODIUM 40 MG: 40 INJECTION SUBCUTANEOUS at 07:46

## 2021-04-22 ASSESSMENT — PAIN DESCRIPTION - ORIENTATION: ORIENTATION: LOWER

## 2021-04-22 ASSESSMENT — PAIN SCALES - GENERAL: PAINLEVEL_OUTOF10: 8

## 2021-04-22 ASSESSMENT — PAIN DESCRIPTION - LOCATION: LOCATION: BACK

## 2021-04-22 ASSESSMENT — PAIN DESCRIPTION - FREQUENCY: FREQUENCY: CONTINUOUS

## 2021-04-22 ASSESSMENT — PAIN DESCRIPTION - PAIN TYPE: TYPE: CHRONIC PAIN

## 2021-04-22 NOTE — PROGRESS NOTES
Home O2 Eval complete. Results sent to Dr. Louie Foster. Dr. Louie Foster calls writer back. Okay to be discharged from his standpoint without completing nocturnal oximetry. Follow up with either Dr. Anabelle Yarbrough or Dr. Nori Cornell in a couple weeks.

## 2021-04-22 NOTE — PROGRESS NOTES
Pulmonary Progress Note  [facility]      Patient - Ricky Lewis,  Age - 77 y.o.    - 1955      Room Number - /-01   N -  [id number]   [facility] # - [de-identified]  Date of Admission -  2021 10:45 PM        Consulting Ana Laura Sapp MD  Primary Care Physician - Zfoia Reyes MD     SUBJECTIVE   Feeling weak, on 2 L O2  No fever, + chills  No chest pain, short of breath, little cough  No nausea vomiting or diarrhea    OBJECTIVE   VITALS    height is 5' 7\" (1.702 m) and weight is 193 lb 12.6 oz (87.9 kg). Her oral temperature is 97.6 °F (36.4 °C). Her blood pressure is 118/49 (abnormal) and her pulse is 65. Her respiration is 18 and oxygen saturation is 96%. Body mass index is 30.35 kg/m². Temperature Range: Temp: 97.6 °F (36.4 °C) Temp  Av.5 °F (36.4 °C)  Min: 97.3 °F (36.3 °C)  Max: 97.9 °F (36.6 °C)  BP Range:  Systolic (67UFB), BKF:562 , Min:104 , BJY:703     Diastolic (27YVJ), ZTM:36, Min:45, Max:77    Pulse Range: Pulse  Av.2  Min: 57  Max: 71  Respiration Range: Resp  Av.4  Min: 18  Max: 20  Current Pulse Ox[de-identified]  SpO2: 96 %  24HR Pulse Ox Range:  SpO2  Av.2 %  Min: 94 %  Max: 96 %  Oxygen Amount and Delivery: O2 Flow Rate (L/min): 2 L/min    Wt Readings from Last 3 Encounters:   21 193 lb 12.6 oz (87.9 kg)   21 200 lb 2.8 oz (90.8 kg)   21 202 lb 12.8 oz (92 kg)       I/O (24 Hours)    Intake/Output Summary (Last 24 hours) at 2021 1412  Last data filed at 2021 4267  Gross per 24 hour   Intake --   Output 1100 ml   Net -1100 ml       EXAM     General Appearance  Awake, alert, oriented, in no acute distress  HEENT - normocephalic, atraumatic.    Neck -no JVD,  trachea midline   Lungs -+ rales, no wheezing or distress  Cardiovascular - Heart sounds are normal.  Regular rate and rhythm   Abdomen - Soft, nontender, nondistended, no guarding  Neurologic -appropriate, following commands, moving all extremities  Skin - No bruising or bleeding  Extremities - No clubbing, cyanosis, edema    MEDS      sodium chloride flush  5-40 mL Intravenous 2 times per day    enoxaparin  40 mg Subcutaneous Daily    dexamethasone  6 mg Oral Daily    aspirin  81 mg Oral BID    calcium carbonate-vitamin D  1 tablet Oral BID    FLUoxetine  10 mg Oral BID    guaiFENesin  600 mg Oral BID    pregabalin  150 mg Oral BID    traZODone  100 mg Oral Nightly    vitamin C  1,000 mg Oral BID    dextromethorphan  60 mg Oral 2 times per day      sodium chloride       sodium chloride flush, sodium chloride flush, sodium chloride, potassium chloride **OR** potassium alternative oral replacement **OR** potassium chloride, magnesium sulfate, promethazine **OR** ondansetron, polyethylene glycol, acetaminophen **OR** acetaminophen, meclizine, oxyCODONE-acetaminophen, benzonatate    LABS   CBC   Recent Labs     04/22/21  0454   WBC 7.2   HGB 13.0   HCT 39.7   MCV 94.1        BMP:   Lab Results   Component Value Date     04/22/2021    K 4.9 04/22/2021     04/22/2021    CO2 26 04/22/2021    BUN 13 04/22/2021    LABALBU 3.5 04/20/2021    LABALBU 4.3 11/07/2011    CREATININE 0.61 04/22/2021    CALCIUM 9.4 04/22/2021    GFRAA >60 04/22/2021    LABGLOM >60 04/22/2021     ABGs:No results found for: PHART, PO2ART, AOJ2KRH   Lab Results   Component Value Date    MODE NOT REPORTED 04/20/2021     Ionized Calcium:  No results found for: IONCA  Magnesium:    Lab Results   Component Value Date    MG 1.8 04/20/2021      Phosphorus:  No results found for: PHOS     LIVER PROFILE   Recent Labs     04/20/21  0030   AST 31   ALT 16   BILITOT 0.28*   ALKPHOS 76     INR   Recent Labs     04/22/21  0454   INR 0.9     PTT No results for input(s): APTT in the last 72 hours. BNP No results for input(s): BNP in the last 72 hours.     RADIOLOGY     (See actual reports for details)    ASSESSMENT/PLAN Principal Problem:    Pneumonia due to COVID-19 virus    Active Problems:    1. COVID-19 pneumonia on top of her pulmonary fibrosis. 2.  Interstitial lung disease, suspicious for IPF, supposed to continue  workup as an outpatient. 3.  Bronchiectasis. 4.  Hypoxia, was on home O2 at 2 L with activity. 5.    Respiratory alkalosis mostly hyperventilation. 6.  Elevated D-dimer at 1.7. Negative CTA chest for PE.     PLAN OF TREATMENT:   O2, home O2 re-evaluation upon discharge    patient probably too late for remdesivir. Continue with dexamethasone for a 10-day course. workup for interstitial lung disease later on.      Lovenox  Hopefully home soon    Electronically signed by Mahogany Dunne MD on 4/22/2021 at 2:12 PM

## 2021-04-22 NOTE — CONSULTS
[address]                 [address] [facility], [address]                                  CONSULTATION    PATIENT NAME: Rea Kwan                      :        1955  MED REC NO:   [id number]                              ROOM:         ACCOUNT NO:   [de-identified]                           ADMIT DATE: 2021  PROVIDER:     Lam Westbrook    CONSULT DATE:  2021    REFERRING PROVIDER:  Dr. Vernon Delacruz. REASON FOR CONSULTATION:  COVID-19 infection. HISTORY OF PRESENT ILLNESS:  This is a 70-year-old female with past  history significant for hyperlipidemia and depression. She was  hospitalized in [facility] from  to , found to have  bronchiectasis, interstitial lung disease, possible IPF, supposed to  continue workup  as outpatient. She was discharged home on 2 L with  oxygen with activity. Apparently her  was sick, but never been  tested for COVID. She noted that since she had left she had some  progressive short of breath, worse with activity, better with rest.  She  had a little bit cough with little bit snots and no wheezing. REVIEW OF SYSTEMS:  GENERAL:  She noted she is feeling hot most of the time, no sweats or  chills. NEURO:  Had no headaches or dizziness. Noted some chronic weakness. EYES:  No redness or watery eyes. ENT:  No nasal congestion or drip. CARDIAC:  No chest pain or palpitation. RESPIRATORY:  Mainly the recurrent short of breath and recurrent cough. No wheezing. GI:  Had no nausea, vomiting or diarrhea. GENITOURINARY:  Had no dysuria or hematuria. MUSCULOSKELETAL:  Had recurrent joint pain. PSYCH:  No anxiety or depressed mood now. Had history of depression  before. SKIN:  No new rash or ulceration. PAST MEDICAL HISTORY:  As noted, significant for the hyperlipidemia and  depression.   She was found recently to have bronchiectasis, interstitial  lung disease and possible IPF and she was November 4, 2019       Danial Mitchell MD  [address]  [facility] [address]  VIA Facsimile: [phone]      Patient: Obie Larsen   YOB: 1963   Date of Visit: 11/4/2019       Dear Dr. Mitchell:    Thank you for referring Obie Larsen to me for evaluation. Below are my notes for this visit with him.    If you have questions, please do not hesitate to call me. I look forward to following your patient along with you.      Sincerely,        Wesly Ribeiro MD        CC: No Recipients  Wesly Ribeiro MD  11/4/2019  3:43 PM  Sign when Signing Visit       [facility]   Advanced Heart Failure Follow-Up Clinic Note    Date of visit: 11/4/2019  Patient Name: Obie Larsen  Medical Record Number: [id number]  YOB: 1963   Primary Physician: Danial Mitchell MD.       SUBJECTIVE   HISTORY OF PRESENT ILLNESS:  Obie Larsen is a 56 year old male with past medical history significant for ischemic cardiomyopathy, EF of 35-40 % diagnosed in 2/18/2019, CAD s/p PCI of the circumflex 3/11/2019,  hypertension, dyslipidemia presents here today for a follow up visit.    Past hospitalization history: December 2018 he was hospitalized at [facility] with dizziness, nausea, lightheadedness and elevated BP.  Back in 2013 he was at [facility] he was at [facility] with chest pain, had an exercise Nuc stress test that showed fixed inferolateral defect with EF of 48 %, no ischemia. He exercised for 5 minutes and jolanta with mild ST elevation inferior leads. He refused LHC and left AMA. An Echo in 2013 showed an EF of 45-50%.   PCI and LEN 3/11/2019, and cardiac MRI thereafter in April 2019 showed EF 38%.        Obie Larsen is doing well. He has no complaints today      He compliant with medications and deny any side effects. No dizziness or lightheadedness.      At present, he denies any angina, shortness of breath, orthopnea, paroxysmal nocturnal dyspnea or LE  edema. Functional status is not significantly changed from prior visit, he has no limitations.    He is unaccompanied      HEART FAILURE MEDICATIONS   [] ACEi [] ARB [x] BB (beta blocker) [] CCB (calcium channel blocker)   [] Corlanor   [] DIG (digoxin) [] Diuretic  [x] Entresto [] Hydralazine [] MRA [] Nitrate       DEVICES   [] ICD (implantable cardioverter-defibrillator)  [] BIV (biventricular) - ICD [] PPM (permanent pacemaker) [] Life Vest  [] CardioMEMS    Frequency / Description   []  YES    [x]  NO Angina:   []  YES    [x]  NO Claudication:   []  YES    [x]  NO Syncope:   []  YES    [x]  NO Orthopnea:   []  YES    [x]  NO PND (paroxysmal nocturnal dyspnea):   []  YES    [x]  NO Pedal edema:   []  YES    [x]  NO Abd Swelling:   []  YES    [x]  NO Early Satiety:   []  YES    [x]  NO Hospitalization:   []  YES    [x]  NO ER Visit:   []  YES    [x]  NO Weight gain:  []  YES    [x]  NO Other:    COMPLIANCE   Description   [x]  YES    []  NO Medication:   [x]  YES    []  NO Diet:    REVIEW OF SYSTEMS:    Constitutional: Negative for fatigue and unexpected weight change.   HENT: Negative for nosebleeds.    Respiratory: Negative for cough and shortness of breath.    Cardiovascular: Negative for chest pain, palpitations and leg swelling.   Gastrointestinal: Negative for abdominal distention, abdominal pain and blood in stool.   Endocrine: Negative for polyuria.   Genitourinary: Negative for hematuria.   Musculoskeletal: Negative for myalgias.   Skin: Negative for pallor.   Neurological: Negative for syncope and light-headedness.   Hematological: Does not bruise/bleed easily.   Otherwise complete ROS is negative.     MEDICATIONS  Outpatient Medications Marked as Taking for the 11/4/19 encounter (Office Visit) with Wesly Ribeiro MD   Medication Sig Dispense Refill   • sacubitril-valsartan (ENTRESTO) 24-26 MG per tablet Take 1 tablet by mouth 2 times daily. 180 tablet 1   • clopidogrel (PLAVIX) 75 MG tablet Take 1  on O2 at 2 L with activity. PAST MEDICAL HISTORY:  She had bilateral ovary removal.  Had left wrist  arthroplasty, had D and C, had ex-lap and back surgery, colon surgery,  colonoscopy, right foot surgery, right hand surgery. FAMILY HISTORY:  Positive for heart disease, hypertension. SOCIAL HISTORY:  She smoked a quarter of a pack for 15 years. She quit  in 2019. Had a total pack use of 3.75. Does drink some alcohol. ALLERGIES:  Significant for AMBIEN, ATIVAN, DARVON, _____ and TOPAMAX. CURRENT MEDICATIONS:  She is on aspirin, dexamethasone and she is on  Lovenox. PHYSICAL EXAMINATION:  VITAL SIGNS:  Her temperature is 97.7, pulse is 75, respiratory rate 22,  blood pressure is 120/54. Saturation anywhere between 92-95% on 2 L. HEENT:  No icterus noted. No JVD or lymphadenopathy appreciated. HEART:  S1, S2 regular. No gallop. LUNGS:  Had bibasilar rales. No wheezing. No distress. ABDOMEN:  Soft, no guarding. Bowel sounds present. EXTREMITIES:  No edema, calf tenderness or stiffness. SKIN:  No new rash or ulceration noted. NEURO:  She is awake, alert, appropriate. DIAGNOSTIC DATA:  Her chest x-ray showed increased left-sided  interstitial infiltrates and the CTA chest did not show any PE, had  ground-glass densities on top of her interstitial lung disease and had  bibasilar bronchiectasis. LABORATORY DATA:  Her lab work significant for a potassium 4.0, BUN is  10, creatinine 0.5. Her CRP was 83.1. Her LFTs unremarkable. Her  white count is 6.8, hemoglobin 12.2, platelets 316. Ferritin was 318,  D-dimer was 1.7. Tested positive for COVID-19. Her venous blood gas pH  7.59, pCO2 26 and bicarb of 25.1. ASSESSMENT:  1.  COVID-19 pneumonia on top of her pulmonary fibrosis. 2.  Interstitial lung disease, suspicious for IPF, supposed to continue  workup as an outpatient. 3.  Bronchiectasis. 4.  Hypoxia, was on home O2 at 2 L with activity.   5.  _____ mostly hyperventilation. 6.  Elevated D-dimer at 1.7. Negative CTA chest for PE. PLAN OF TREATMENT:  The patient probably has two days for remdesivir. Continue with dexamethasone for a 10-day course. She is to finish her  workup for interstitial lung disease later on. Continue with O2. Will  need home O2 reevaluation upon discharge. We will continue with Lovenox  for DVT prophylaxis. We do appreciate the consultation and we will follow.         Charlette Ridley    D: 04/21/2021 16:23:13       T: 04/21/2021 16:30:53     [email or web address]  Job#: [id number]     Doc#: [id number]    CC: tablet by mouth daily. 90 tablet 0   • carvedilol (COREG) 25 MG tablet Take 1 tablet by mouth 2 times daily (with meals). 180 tablet 1   • atorvastatin (LIPITOR) 40 MG tablet Take 1 tablet by mouth daily. 90 tablet 0   • aspirin 81 MG EC tablet Take 1 tablet by mouth daily. 90 tablet 1       ALLERGIES   ALLERGIES:  No Known Allergies    PAST HISTORY:  I have reviewed the past medical history, family history, social history, medications and allergies listed in the medical records as obtained by my nursing staff and support staff and agree with their documentation    OBJECTIVE  PHYSICAL EXAMINATION:  Vitals:    Visit Vitals  /75 (BP Location: LUE - Left upper extremity, Patient Position: Sitting, Cuff Size: Regular)   Pulse 57   Wt 109.3 kg (241 lb)   SpO2 100%   BMI 30.12 kg/m²      BMI (body mass index):Body mass index is 30.12 kg/m².  Constitutional: well nourished 56 year old male in no acute distress.  Skin: Warm, dry, intact, no lesions.  HEENT: Normocephalic, atraumatic. Oral mucous membranes moist, EOMs (extraocular movements) intact.  Neck: Supple, trachea midline, JVP is normal,  negative HJR (hepatojugular reflux), No thyromegaly.  Cardiovascular: Normal S1, S2. regular rhythm. Murmur: none. Negative S3, S4       Respiratory: Anterior/posterior lung sounds Clear  to auscultation bilaterally.   Abdomen: Soft, nontender, negative hepatomegaly and normal bowel sounds.  Musculoskeletal/Extremities: CRT (capillary refill time) <3, no clubbing, no cyanosis, no edema.  Neurological: No focal neurological deficits and speech normal. Sensation grossly intact.  Psychiatric: Alert and oriented to person, place and time.    LABORATORY:  Lab Results   Component Value Date    POTASSIUM 4.4 08/07/2019    SODIUM 139 08/07/2019    CO2 21 08/07/2019    CHLORIDE 105 08/07/2019    BUN 20 08/07/2019    CREATININE 1.26 (H) 08/07/2019    GLUCOSE 108 (H) 08/07/2019    CALCIUM 8.8 08/07/2019    WBC 6.9 03/12/2019    RBC 4.69  03/12/2019    HCT 41.2 03/12/2019    HGB 14.1 03/12/2019     03/12/2019    CHOLESTEROL 167 03/11/2019    CHOLESTEROL Desirable            <200 03/11/2019    CHOLESTEROL Borderline High      200 to 239 03/11/2019    CHOLESTEROL High                 >=240 03/11/2019    HDL 29 (L) 03/11/2019    HDL Low            <40 03/11/2019    HDL Borderline Low 40 to 49 03/11/2019    HDL Near Optimal   50 to 59 03/11/2019    HDL Optimal        >=60 03/11/2019    CHOHDL 5.8 (H) 03/11/2019    TRIGLYCERIDE 212 (H) 03/11/2019    TRIGLYCERIDE Normal                   <150 03/11/2019    TRIGLYCERIDE Borderline High          150 to 199 03/11/2019    TRIGLYCERIDE High                     200 to 499 03/11/2019    TRIGLYCERIDE Very High                >=500 03/11/2019    CALCLDL BORDERLINE HIGH       130-159 03/11/2019    CALCLDL HIGH                  160-189 03/11/2019    CALCLDL VERY HIGH             >=190 03/11/2019    CALCLDL 96 03/11/2019    CALCLDL OPTIMAL               <100 03/11/2019    CALCLDL NEAR OPTIMAL          100-129 03/11/2019       DIAGNOSTICS:  The following diagnostics were reviewed.    Angiogram 3/11/19:      Hemodynamic Data (mmHg):     Right Heart Cath Finding:      PCWP:  4 mmHg       PA:   19/5  (11) mmHg    RV:  18/2  (3) mmHG      RA:  0 mmHg     Ao:  120/75  (95) mmHg       CO:   6.0  CI:    2.5     Results / Findings:  Left Heart Cath Finding:         LM: Angiographically normal vessel.  Bifurcates into the LAD and circumflex.    LAD: Long vessel reaching the apex into the inferior wall.  Gives rise to one large diagonal system.  Intermediate in the range of 60% lesion in the proximal portion.   IFR of 0.93// FFR value of 0.90  Cx: Medium size system.  First OM with 60% ostial lesion.  Subsequently bifurcates into either arm system.  Right before the bifurcation was a 90% lesion which was successfully treated to 0% stenosis with a Xscience 3.0 x 12 LEN  RCA: Large giving rise to the PDA and PL branch.   Severe disease with bridging collaterals in the midportion.  Distal PDA and PL branch with mild diffuse disease.  Lv Function: Not done due to elevatd Cr/ Normal LVEDP          4/9/2019  MRI Cardiac Function Complete with IV contrast     INDICATION: Ischemic Cardiomyopathy.     Procedure: Cardiac MRI and/or MRA of the chest was performed with multiplanar single shot and cine bTFE and/or axial and coronal VETO. T1 enhanced images with and without fat suppression were obtained, T2 enhanced images were obtained. Additional image post-processing was performed on a computer workstation to calculate LV and/or RV functional parameters.     Rhythm and Heart rate during acquisition: Sinus rhythm     Quality: Adequate     FINDINGS:     Left ventricle: The LV size is dilated. The LVEDV is 299 ml, LVESV is 187 ml and the calculated LVEF is 38%. Delayed enhancement images show several areas of scar. There is medium amount of close to transmural scar in the basal, mid and distal inferolateral segment with dyskinesis. There are 2 focal areas of transmural scar in the basal and mid anteroseptum  associated with mild hypokinesis. There are no additional regional wall motion abnormalities and there is normal wall thickness of the LV segments. There is no wall edema. There is no LV thrombus.     Right ventricle: The RV size is upper normal. The RV function is normal.     Left atrium: Mildly dilated.     Right atrium: Normal size     Valves: Limited visualization of valves, without significant pathology noted.     Great vessels: Normal size     Pericardium: Normal thickness. No effusion     IMPRESSION:     1. Ischemic Cardiomyopathy with medium size infarct without viability in the RCA territory and small amount of infarct in the LAD territory with residual viability.     2. LVEF of 38%. No LV thrombus noted.     It should be noted that the study was designed to image the cardiovascular structures. Because of that, a differentiation  between artifacts and pathology for the other organs included in the field of view cannot be made.  ------------------------------        Echo 2/18/2019     LEFT VENTRICLE:  The cavity size is mildly dilated. Wall thickness is  moderately increased. There is moderate concentric hypertrophy. Systolic  function is moderately reduced. The estimated ejection fraction is 35-40%,  by visual assessment.  Regional wall motion abnormalities:   Akinesis and  scarring of the basal-midinferior myocardium; consistent with infarction  in the distribution of the right coronary or left circumflex coronary  artery. The tissue Doppler parameters are abnormal. Doppler parameters are  consistent with abnormal left ventricular relaxation (grade 1 diastolic  dysfunction).     AORTIC VALVE:   Mildly calcified annulus. Trileaflet. Cusp separation is  normal.  Doppler:  Transvalvular velocity is within the normal range.  There is no stenosis.  No regurgitation.    Valve area (VTI): 3cm2.  Indexed valve area (VTI): 1.24cm2/m2.    Mean gradient (S): 4mm Hg.     AORTA:  Aortic root: The aortic root is normal in size.     MITRAL VALVE:   Structurally normal valve.   Leaflet separation is normal.   Doppler:  Transvalvular velocity is within the normal range. There is no  evidence for stenosis.  No regurgitation.     ATRIAL SEPTUM:   Color doppler shows no obvious shunt.     LEFT ATRIUM:  The atrium is normal in size.     RIGHT VENTRICLE:  The cavity size is normal. Systolic function is normal.  The TAPSE is normal, suggestive of normal RV systolic function. Systolic  pressure is indeterminant due to the absence of tricuspid regurgitation.     VENTRICULAR SEPTUM:   Thickness is moderately increased.     PULMONIC VALVE:   Poorly visualized. The leaflets are normal thickness.  Doppler:   No significant regurgitation.     TRICUSPID VALVE:   Structurally normal valve.   Leaflet separation is  normal.  Doppler:  Transvalvular velocity is within the  normal range.  There is no evidence for stenosis.  No regurgitation.     PULMONARY ARTERY:   The main pulmonary artery is normal-sized. Systolic  pressure is indeterminate due to the absence of tricuspid regurgitation.     RIGHT ATRIUM:  The atrium is normal in size.     PERICARDIUM:  There is no pericardial effusion.     SYSTEMIC VEINS:  Inferior vena cava: The vessel is normal in size. The respirophasic  diameter changes are in the normal range (greater than or equal to 50%).     4/9/2019, cardiac MRI     Left ventricle: The LV size is dilated. The LVEDV is 299 ml, LVESV is 187 ml and the calculated LVEF is 38%. Delayed enhancement images show several areas of scar. There is medium amount of close to transmural scar in the basal, mid and distal inferolateral segment with dyskinesis. There are 2 focal areas of transmural scar in the basal and mid anteroseptum  associated with mild hypokinesis. There are no additional regional wall motion abnormalities and there is normal wall thickness of the LV segments. There is no wall edema. There is no LV thrombus.     Right ventricle: The RV size is upper normal. The RV function is normal.     Left atrium: Mildly dilated.     Right atrium: Normal size     Valves: Limited visualization of valves, without significant pathology noted.     Great vessels: Normal size     Pericardium: Normal thickness. No effusion     IMPRESSION:     1. Ischemic Cardiomyopathy with medium size infarct without viability in the RCA territory and small amount of infarct in the LAD territory with residual viability.     2. LVEF of 38%. No LV thrombus noted.      ASSESSMENT/PLAN:  1. Ischemic cardiomyopathy    2. Coronary artery disease involving native coronary artery of native heart without angina pectoris    3. Essential hypertension    4. S/P coronary artery stent placement        From cardiac standpoint, he is stable: ACC/ AHA Stage C, New York Heart Association Classification: NYHA Class I: No HF  symptoms/activity intolerance (Able to do heavy yardwork, walk up steep hills)    Volume status is normal .   Perfusion status is normal.   Labs from August 2019 Reviewed.    Based on objective data and clinical data will augment medical therapy as follows:  OMT (optimal medical therapy): He is to continue coreg 25 mg po bid, entresto 24-26 mg po bid [Previously when Entresto dose was increased to 49/51 mg  was experiencing a lot of dizziness and dose was reduced back to 24/26 mg twice daily]     ICD (implantable cardioverter defibrillator)/CRT-D : Not a candidate at this time    Dietary Guidelines discussed with the patient, instructed to consume < 2.5 g Na per day.      He is euvolemic, asymptomatic, hemodynamically stable on exam today    No changes regarding his cardiac medications    Ordered a bmp study to be done today    He is to return back to clinic in 3 months    Patient understands he/she can return sooner if any issues such as orthopnea, dyspnea on exertion, paroxysmal nocturnal dyspnea, dizziness, lightheadedness or angina should arise.     Wesly Ribeiro MD   Advanced Heart Failure  [facility]

## 2021-04-22 NOTE — PLAN OF CARE
Problem: Airway Clearance - Ineffective  Goal: Achieve or maintain patent airway  Outcome: Completed     Problem: Gas Exchange - Impaired  Goal: Absence of hypoxia  Outcome: Completed  Goal: Promote optimal lung function  Outcome: Completed     Problem: Breathing Pattern - Ineffective  Goal: Ability to achieve and maintain a regular respiratory rate  Outcome: Completed     Problem:  Body Temperature -  Risk of, Imbalanced  Goal: Ability to maintain a body temperature within defined limits  Outcome: Completed  Goal: Will regain or maintain usual level of consciousness  Outcome: Completed  Goal: Complications related to the disease process, condition or treatment will be avoided or minimized  Outcome: Completed     Problem: Isolation Precautions - Risk of Spread of Infection  Goal: Prevent transmission of infection  Outcome: Completed     Problem: Nutrition Deficits  Goal: Optimize nutritional status  Outcome: Completed     Problem: Risk for Fluid Volume Deficit  Goal: Maintain normal heart rhythm  Outcome: Completed  Goal: Maintain absence of muscle cramping  Outcome: Completed  Goal: Maintain normal serum potassium, sodium, calcium, phosphorus, and pH  Outcome: Completed     Problem: Loneliness or Risk for Loneliness  Goal: Demonstrate positive use of time alone when socialization is not possible  Outcome: Completed     Problem: Fatigue  Goal: Verbalize increase energy and improved vitality  Outcome: Completed     Problem: Patient Education: Go to Patient Education Activity  Goal: Patient/Family Education  Outcome: Completed     Problem: Pain:  Goal: Pain level will decrease  Description: Pain level will decrease  Outcome: Completed  Goal: Control of acute pain  Description: Control of acute pain  Outcome: Completed  Goal: Control of chronic pain  Description: Control of chronic pain  Outcome: Completed     Problem: Skin Integrity:  Goal: Will show no infection signs and symptoms  Description: Will show no

## 2021-04-22 NOTE — CARE COORDINATION
ONGOING DISCHARGE PLAN:    Reviewed patients chart regarding discharge plan and chart still confirms the plan is to discharge to home with no needs  Patient remains on 2 liters home ox   Decadron     Will review plan with patient and or family      Will continue to follow for additional discharge needs.      Electronically signed by Tru Yan RN on 4/22/2021 at 12:48 PM

## 2021-04-22 NOTE — PROGRESS NOTES
Home Oxygen Evaluation    Room air SpO2 at Rest = 93%    Room air with exercise/exertion = 86%    SpO2 on prescribed O2 level at 2   LPM  at rest = 96%     with exercise/exertion = 92%, needed 3lpm to recover. Nocturnal Oximetry with patient on room air recommended if the resting SpO2 is 89% to 95%.  (Requires additional order)

## 2021-04-23 ENCOUNTER — CARE COORDINATION (OUTPATIENT)
Dept: CASE MANAGEMENT | Age: 66
End: 2021-04-23

## 2021-04-23 NOTE — CARE COORDINATION
Spoke with patient and , patient aware of Tues AM appt with PCP office. Ask patient if she had all she needs. Patient states they had not picked up her meds from [address], but according to patient will do that tonight. Request from [address] [place] [address] Preston Squires RN. Please help patient with hospital f/u visit. Patient is Covid positive. Made appt for PCP tele visit on 4/27@ [facility] pulmonology, patient has an appt on 5/25 @ 1:15pm.   Per CTN patient request appt be moved up if possible. Called pulmonology, was told provider normally see's covid positive patient 4-6 weeks after diagnoses. However, they do have a NP they can schedule her with in [place] if patient would like to drive that far. Called patients number, no answer VM full. Called second number, which belongs to   LVM to return this writer call.      Will send this note to Soto Simental, RN    Kavitha Awan, [facility]  Care Coordination Transition

## 2021-04-23 NOTE — CARE COORDINATION
[facility] Transitions Initial Follow Up Call- COVID risk follow up call      Call within 2 business days of discharge: Yes    Patient: Geovanni Demarco Patient : 1955   MRN: [id number]  Reason for Admission: pneumonia d/t COVID  Discharge Date: 21 RARS: Readmission Risk Score: 14      Last Discharge [address] [place] [address]       Complaint Diagnosis Description Type Department Provider    21 diff breathing  Pneumonia due to COVID-19 virus . .. ED to Hosp-Admission (Discharged) (ADMITTED) Mae Matias MD; Sugey Carbajal. .. Spoke with: pt  [facility] (okay HIPAA)     Call to pt  (pt not available) who states pt is doing real good  States breathing has been fine. States she did bump up her O2  To 3L overnight. States they do not have a pulse oximeter but he will look into getting one- instructions on monitoring oxygen levels provided  States not really coughing like she was  States understanding she should be drinking plenty of fluids and getting protein in throughout the day (even in small amounts)- states she ate yesterday before coming home so he doesn't know how her appetite is   Confirms isolation for pt through  (diagnosed on  or ). He states she was not given a specific date. He confirms he was tested yesterday but does not have results yet. States understanding to remain in quarantine through her isolation completion    Patient contacted regarding COVID-19 diagnosis\". Discussed COVID-19 related testing which was available at this time. Test results were positive. Patient informed of results, if available? Yes    Care Transition Nurse contacted the family by telephone to perform post discharge assessment. Call within 2 business days of discharge: Yes. Verified name and  with family as identifiers. Provided introduction to self, and explanation of the CTN/ACM role, and reason for call due to risk factors for infection and/or exposure to COVID-19. Symptoms reviewed with family who verbalized the following symptoms: fatigue, no new symptoms and no worsening symptoms. Due to no new or worsening symptoms encounter was not routed to provider for escalation. Discussed follow-up appointments. If no appointment was previously scheduled, appointment scheduling offered: Yes  [facility] follow up appointment(s): No future appointments. Non-[facility] follow up appointment(s):     Non-face-to-face services provided:  Scheduled appointment with PCP-pt  agreeable to have KRYSTA Tinajero MA  help with scheduling  Scheduled appointment with [address] [place] [address]  agreeable to have KRYSTA Tinajero MA  help with scheduling Pulm appt   Assessment and support for treatment adherence and medication management-confirms he will  new Rx today as pharmacy was closed when she was discharged      Advance Care Planning:   Does patient have an Advance Directive:  decision maker updated. Patient has following risk factors of: COPD, pneumonia and covid. CTN reviewed discharge instructions, medical action plan and red flags such as increased shortness of breath, increasing fever and signs of decompensation with family who verbalized understanding. Discussed exposure protocols and quarantine with CDC Guidelines What to do if you are sick with coronavirus disease 2019.  Family was given an opportunity for questions and concerns. The family agrees to contact the [facility] exposure line [phone], [facility] department [address]: ([phone]) and PCP office for questions related to their healthcare. CTN provided contact information for future needs. Reviewed and educated patient on any new and changed medications related to discharge diagnosis     Was patient discharged with a pulse oximeter? No but he will look for one when he picks up Rx.  Discussed and confirmed pulse oximeter discharge instructions and when to notify provider or seek emergency care. Patient/family/caregiver given information for [employer] and agrees to enroll no  Patient's preferred e-mail:    Patient's preferred phone number:   Based on Loop alert triggers, patient will be contacted by nurse care manager for worsening symptoms. Plan for follow-up call in 5-7 days based on severity of symptoms and risk factors. Care Transitions 24 Hour Call    Do you have any ongoing symptoms?: No  Do you have a copy of your discharge instructions?: Yes  Do you have all of your prescriptions and are they filled?: Yes  Have you been contacted by a [address]?: No  Have you scheduled your follow up appointment?: No  Were you discharged with any Home Care or Post Acute Services: No  Do you feel like you have everything you need to keep you well at home?: Yes  Care Transitions Interventions         Follow Up  No future appointments.     Willy Callejas RN

## 2021-04-30 ENCOUNTER — CARE COORDINATION (OUTPATIENT)
Dept: CASE MANAGEMENT | Age: 66
End: 2021-04-30

## 2021-04-30 NOTE — CARE COORDINATION
[facility] Transitions Follow Up Call    2021    Patient: Sylvia Garduno  Patient : 1955   MRN: [id number]  Reason for Admission:   Discharge Date: 21 RARS: Readmission Risk Score: 14         # 1 attempt-unable to reach patient, left message with name and call back information, requested call back, will continue to follow//JU        Plan for follow-up call in 5-7 days based on severity of symptoms and risk factors. Plan for next call: routine  CTN provided contact information for future needs. Care Transitions Subsequent and Final Call    Subsequent and Final Calls  Care Transitions Interventions  Other Interventions: Follow Up  No future appointments.     Barbara Hinojosa RN

## 2021-05-04 RX ORDER — MECLIZINE HYDROCHLORIDE 25 MG/1
TABLET ORAL
Qty: 60 TABLET | Refills: 0 | Status: SHIPPED | OUTPATIENT
Start: 2021-05-04 | End: 2021-06-15

## 2021-05-04 NOTE — TELEPHONE ENCOUNTER
Roslyn Monge is calling to request a refill on the following medication(s):    Last Visit Date (If Applicable):  7/06/8644    Next Visit Date:    Visit date not found    Medication Request:  Requested Prescriptions     Pending Prescriptions Disp Refills    meclizine (ANTIVERT) 25 MG tablet [Pharmacy Med Name: Meclizine HCl 25 MG Oral Tablet] 60 tablet 0     Sig: Take 1 tablet by mouth twice daily as needed

## 2021-05-05 ENCOUNTER — CARE COORDINATION (OUTPATIENT)
Dept: CASE MANAGEMENT | Age: 66
End: 2021-05-05

## 2021-05-05 NOTE — CARE COORDINATION
[facility] Transitions Follow Up Call    2021    Patient: Jyoti Delgadillo  Patient : 1955   MRN: [id number]  Reason for Admission:   Discharge Date: 21 RARS: Readmission Risk Score: 14         # 2 attempt-unable to reach patient, left vm message with name and call back number, 2 unsuccessful attempt to reach patient, care transitions compelted//JU    Care Transitions Subsequent and Final Call    Subsequent and Final Calls  Care Transitions Interventions  Other Interventions: Follow Up  No future appointments.     Erika Garza RN

## 2021-05-07 ENCOUNTER — TELEPHONE (OUTPATIENT)
Dept: FAMILY MEDICINE CLINIC | Age: 66
End: 2021-05-07

## 2021-06-04 ENCOUNTER — HOSPITAL ENCOUNTER (OUTPATIENT)
Dept: CT IMAGING | Age: 66
Discharge: HOME | End: 2021-06-06
Payer: MEDICARE

## 2021-06-04 PROCEDURE — 71250 CT THORAX DX C-: CPT

## 2021-06-15 RX ORDER — MECLIZINE HYDROCHLORIDE 25 MG/1
TABLET ORAL
Qty: 60 TABLET | Refills: 0 | Status: SHIPPED | OUTPATIENT
Start: 2021-06-15 | End: 2021-08-23

## 2021-06-15 NOTE — TELEPHONE ENCOUNTER
Lina Garcia is calling to request a refill on the following medication(s):    Last Visit Date (If Applicable):  8/36/0305    Next Visit Date:    Visit date not found    Medication Request:  Requested Prescriptions     Pending Prescriptions Disp Refills    meclizine (ANTIVERT) 25 MG tablet [Pharmacy Med Name: Meclizine HCl 25 MG Oral Tablet] 60 tablet 0     Sig: Take 1 tablet by mouth twice daily as needed

## 2021-06-23 ENCOUNTER — HOSPITAL ENCOUNTER (OUTPATIENT)
Dept: GENERAL RADIOLOGY | Age: 66
Discharge: HOME | End: 2021-06-25
Payer: MEDICARE

## 2021-06-23 ENCOUNTER — HOSPITAL ENCOUNTER (OUTPATIENT)
Age: 66
Discharge: HOME | End: 2021-06-25
Payer: MEDICARE

## 2021-06-23 DIAGNOSIS — R06.02 SHORTNESS OF BREATH: ICD-10-CM

## 2021-06-23 PROCEDURE — 71046 X-RAY EXAM CHEST 2 VIEWS: CPT

## 2021-06-28 ENCOUNTER — TELEPHONE (OUTPATIENT)
Dept: FAMILY MEDICINE CLINIC | Age: 66
End: 2021-06-28

## 2021-06-28 DIAGNOSIS — J47.9 BRONCHIECTASIS WITHOUT COMPLICATION (HCC): Primary | ICD-10-CM

## 2021-06-28 NOTE — TELEPHONE ENCOUNTER
Referral placed to Alyssa Garcia MD, [facility]. Please let pt know and fax the referral. Thank you.

## 2021-06-28 NOTE — TELEPHONE ENCOUNTER
Patient's hisband calling in asking for new referral to pulmonologist. States they are not happy with the pulmonary physician they are currently with.  Please advise

## 2021-08-05 ENCOUNTER — HOSPITAL ENCOUNTER (OUTPATIENT)
Age: 66
Discharge: HOME | End: 2021-08-05
Payer: MEDICARE

## 2021-08-05 LAB
ANGIOTENSIN-CONVERTING ENZYME: 39 U/L (ref 8–52)
RHEUMATOID FACTOR: <10 IU/ML

## 2021-08-05 PROCEDURE — 36415 COLL VENOUS BLD VENIPUNCTURE: CPT

## 2021-08-05 PROCEDURE — 86038 ANTINUCLEAR ANTIBODIES: CPT

## 2021-08-05 PROCEDURE — 82164 ANGIOTENSIN I ENZYME TEST: CPT

## 2021-08-05 PROCEDURE — 86431 RHEUMATOID FACTOR QUANT: CPT

## 2021-08-05 PROCEDURE — 86225 DNA ANTIBODY NATIVE: CPT

## 2021-08-06 LAB
ANTI DNA DOUBLE STRANDED: <0.5 IU/ML
ANTI-NUCLEAR ANTIBODY (ANA): NEGATIVE
ENA ANTIBODIES SCREEN: 0.2 U/ML

## 2021-08-23 DIAGNOSIS — M51.26 DISPLACEMENT OF LUMBAR INTERVERTEBRAL DISC WITHOUT MYELOPATHY: ICD-10-CM

## 2021-08-23 DIAGNOSIS — S33.8XXS SACRUM SPRAIN, SEQUELA: ICD-10-CM

## 2021-08-23 DIAGNOSIS — S33.9XXS SPRAIN OF LIGAMENT OF LUMBOSACRAL JOINT, SEQUELA: ICD-10-CM

## 2021-08-23 DIAGNOSIS — M51.26 DISPLACEMENT OF INTERVERTEBRAL DISC OF LUMBAR REGION: ICD-10-CM

## 2021-08-23 DIAGNOSIS — S39.012S STRAIN OF LUMBAR REGION, SEQUELA: ICD-10-CM

## 2021-08-23 RX ORDER — TRAZODONE HYDROCHLORIDE 100 MG/1
TABLET ORAL
Qty: 30 TABLET | Refills: 0 | Status: SHIPPED | OUTPATIENT
Start: 2021-08-23 | End: 2021-10-01

## 2021-08-23 RX ORDER — MECLIZINE HYDROCHLORIDE 25 MG/1
TABLET ORAL
Qty: 60 TABLET | Refills: 0 | Status: SHIPPED | OUTPATIENT
Start: 2021-08-23 | End: 2021-11-15

## 2021-08-23 NOTE — TELEPHONE ENCOUNTER
Jayda Loya is calling to request a refill on the following medication(s):    Last Visit Date (If Applicable):  7/43/8885    Next Visit Date:    Visit date not found    Medication Request:  Requested Prescriptions     Pending Prescriptions Disp Refills    traZODone (DESYREL) 100 MG tablet [Pharmacy Med Name: traZODone HCl 100 MG Oral Tablet] 30 tablet 0     Sig: TAKE 1 TABLET BY MOUTH AT NIGHT    meclizine (ANTIVERT) 25 MG tablet [Pharmacy Med Name: Meclizine HCl 25 MG Oral Tablet] 60 tablet 0     Sig: Take 1 tablet by mouth twice daily as needed

## 2021-09-14 ENCOUNTER — TELEPHONE (OUTPATIENT)
Dept: FAMILY MEDICINE CLINIC | Age: 66
End: 2021-09-14

## 2021-09-14 RX ORDER — ZINC OXIDE 13 %
CREAM (GRAM) TOPICAL 2 TIMES DAILY PRN
Qty: 113 G | Refills: 0 | Status: SHIPPED | OUTPATIENT
Start: 2021-09-14

## 2021-09-14 NOTE — TELEPHONE ENCOUNTER
----- Message from Galilea Hudson sent at 9/14/2021  1:01 PM EDT -----  Subject: Message to Provider    QUESTIONS  Information for Provider? Pt has been discharged for over one month and   she still has a rash in her private area that is still red, raw and very   itchy. Pt uses [facility] on [place].   ---------------------------------------------------------------------------  --------------  [address]  What is the best way for the office to contact you? OK to leave message on   voicemail  Preferred Call Back Phone Number? [phone]  ---------------------------------------------------------------------------  --------------  SCRIPT ANSWERS  Relationship to Patient?  Self

## 2021-09-24 RX ORDER — NYSTATIN 100000 U/G
OINTMENT TOPICAL
Qty: 30 G | Refills: 1 | Status: SHIPPED | OUTPATIENT
Start: 2021-09-24

## 2021-10-01 DIAGNOSIS — S39.012S STRAIN OF LUMBAR REGION, SEQUELA: ICD-10-CM

## 2021-10-01 DIAGNOSIS — S33.8XXS SACRUM SPRAIN, SEQUELA: ICD-10-CM

## 2021-10-01 DIAGNOSIS — M51.26 DISPLACEMENT OF INTERVERTEBRAL DISC OF LUMBAR REGION: ICD-10-CM

## 2021-10-01 DIAGNOSIS — S33.9XXS SPRAIN OF LIGAMENT OF LUMBOSACRAL JOINT, SEQUELA: ICD-10-CM

## 2021-10-01 DIAGNOSIS — M51.26 DISPLACEMENT OF LUMBAR INTERVERTEBRAL DISC WITHOUT MYELOPATHY: ICD-10-CM

## 2021-10-01 RX ORDER — TRAZODONE HYDROCHLORIDE 100 MG/1
TABLET ORAL
Qty: 30 TABLET | Refills: 0 | Status: SHIPPED | OUTPATIENT
Start: 2021-10-01 | End: 2021-11-05

## 2021-10-01 NOTE — TELEPHONE ENCOUNTER
Sd De Guzman is calling to request a refill on the following medication(s):    Last Visit Date (If Applicable):  1/11/8416    Next Visit Date:    Visit date not found    Medication Request:  Requested Prescriptions     Pending Prescriptions Disp Refills    traZODone (DESYREL) 100 MG tablet [Pharmacy Med Name: traZODone HCl 100 MG Oral Tablet] 30 tablet 0     Sig: TAKE 1 TABLET BY MOUTH AT NIGHT

## 2021-10-05 ENCOUNTER — HOSPITAL ENCOUNTER (OUTPATIENT)
Dept: LAB | Age: 66
Setting detail: SPECIMEN
Discharge: HOME | End: 2021-10-05
Payer: MEDICARE

## 2021-10-05 PROCEDURE — U0005 INFEC AGEN DETEC AMPLI PROBE: HCPCS

## 2021-10-05 PROCEDURE — U0003 INFECTIOUS AGENT DETECTION BY NUCLEIC ACID (DNA OR RNA); SEVERE ACUTE RESPIRATORY SYNDROME CORONAVIRUS 2 (SARS-COV-2) (CORONAVIRUS DISEASE [COVID-19]), AMPLIFIED PROBE TECHNIQUE, MAKING USE OF HIGH THROUGHPUT TECHNOLOGIES AS DESCRIBED BY CMS-2020-01-R: HCPCS

## 2021-10-06 DIAGNOSIS — F41.1 GENERALIZED ANXIETY DISORDER: ICD-10-CM

## 2021-10-06 DIAGNOSIS — Z13.31 POSITIVE DEPRESSION SCREENING: ICD-10-CM

## 2021-10-06 NOTE — TELEPHONE ENCOUNTER
Beatrice Mcfarland is calling to request a refill on the following medication(s):    Last Visit Date (If Applicable):  0/24/0685    Next Visit Date:    Visit date not found    Medication Request:  Requested Prescriptions     Pending Prescriptions Disp Refills    FLUoxetine (PROZAC) 10 MG capsule [Pharmacy Med Name: FLUoxetine HCl 10 MG Oral Capsule] 60 capsule 0     Sig: Take 1 capsule by mouth twice daily

## 2021-10-07 LAB
SARS-COV-2: NORMAL
SARS-COV-2: NOT DETECTED
SOURCE: NORMAL

## 2021-10-07 RX ORDER — FLUOXETINE 10 MG/1
CAPSULE ORAL
Qty: 60 CAPSULE | Refills: 0 | Status: SHIPPED | OUTPATIENT
Start: 2021-10-07 | End: 2021-11-10

## 2021-10-08 ENCOUNTER — HOSPITAL ENCOUNTER (OUTPATIENT)
Dept: NON INVASIVE DIAGNOSTICS | Age: 66
Discharge: HOME | End: 2021-10-08
Payer: MEDICARE

## 2021-10-08 ENCOUNTER — HOSPITAL ENCOUNTER (OUTPATIENT)
Dept: PULMONOLOGY | Age: 66
Discharge: HOME | End: 2021-10-08
Payer: MEDICARE

## 2021-10-08 DIAGNOSIS — R06.02 SOB (SHORTNESS OF BREATH): ICD-10-CM

## 2021-10-08 DIAGNOSIS — J47.9 BRONCHIECTASIS WITHOUT ACUTE EXACERBATION (HCC): ICD-10-CM

## 2021-10-08 LAB
DLCO %PRED: NORMAL
DLCO PRED: NORMAL
DLCO/VA %PRED: NORMAL
DLCO/VA PRED: NORMAL
DLCO/VA: NORMAL
DLCO: NORMAL
EXPIRATORY TIME-POST: NORMAL
EXPIRATORY TIME: NORMAL
FEF 25-75% %CHNG: NORMAL
FEF 25-75% %PRED-POST: NORMAL
FEF 25-75% %PRED-PRE: NORMAL
FEF 25-75% PRED: NORMAL
FEF 25-75%-POST: NORMAL
FEF 25-75%-PRE: NORMAL
FEV1 %PRED-POST: NORMAL
FEV1 %PRED-PRE: NORMAL
FEV1 PRED: NORMAL
FEV1-POST: NORMAL
FEV1-PRE: NORMAL
FEV1/FVC %PRED-POST: NORMAL
FEV1/FVC %PRED-PRE: NORMAL
FEV1/FVC PRED: NORMAL
FEV1/FVC-POST: NORMAL
FEV1/FVC-PRE: NORMAL
FVC %PRED-POST: NORMAL
FVC %PRED-PRE: NORMAL
FVC PRED: NORMAL
FVC-POST: NORMAL
FVC-PRE: NORMAL
GAW %PRED: NORMAL
GAW PRED: NORMAL
GAW: NORMAL
IC %PRED: NORMAL
IC PRED: NORMAL
IC: NORMAL
LV EF: 55 %
LVEF MODALITY: NORMAL
MEP: NORMAL
MIP: NORMAL
MVV %PRED-PRE: NORMAL
MVV PRED: NORMAL
MVV-PRE: NORMAL
PEF %PRED-POST: NORMAL
PEF %PRED-PRE: NORMAL
PEF PRED: NORMAL
PEF%CHNG: NORMAL
PEF-POST: NORMAL
PEF-PRE: NORMAL
RAW %PRED: NORMAL
RAW PRED: NORMAL
RAW: NORMAL
RV %PRED: NORMAL
RV PRED: NORMAL
RV: NORMAL
SVC %PRED: NORMAL
SVC PRED: NORMAL
SVC: NORMAL
TLC %PRED: NORMAL
TLC PRED: NORMAL
TLC: NORMAL
VA %PRED: NORMAL
VA PRED: NORMAL
VA: NORMAL
VTG %PRED: NORMAL
VTG PRED: NORMAL
VTG: NORMAL

## 2021-10-08 PROCEDURE — 94664 DEMO&/EVAL PT USE INHALER: CPT

## 2021-10-08 PROCEDURE — 93306 TTE W/DOPPLER COMPLETE: CPT

## 2021-10-08 PROCEDURE — 94729 DIFFUSING CAPACITY: CPT

## 2021-10-08 PROCEDURE — 94726 PLETHYSMOGRAPHY LUNG VOLUMES: CPT

## 2021-10-08 PROCEDURE — 94060 EVALUATION OF WHEEZING: CPT

## 2021-10-08 PROCEDURE — 94375 RESPIRATORY FLOW VOLUME LOOP: CPT

## 2021-10-08 NOTE — PROCEDURES
INTERPRETATION:    Restrictive Ventilatory defect of mild moderate severity is noted. Evidence of air trapping / hyperinflation not seen. Diffusion capacity is reduced. No Significant Improvement noted after bronchodilators.     Alondra Negro MD

## 2021-11-06 ENCOUNTER — HOSPITAL ENCOUNTER (EMERGENCY)
Age: 66
Discharge: HOME | End: 2021-11-06
Attending: EMERGENCY MEDICINE
Payer: MEDICARE

## 2021-11-06 VITALS
SYSTOLIC BLOOD PRESSURE: 130 MMHG | RESPIRATION RATE: 20 BRPM | HEART RATE: 79 BPM | HEIGHT: 64 IN | WEIGHT: 193 LBS | DIASTOLIC BLOOD PRESSURE: 68 MMHG | TEMPERATURE: 98.9 F | BODY MASS INDEX: 32.95 KG/M2 | OXYGEN SATURATION: 95 %

## 2021-11-06 DIAGNOSIS — S81.812A LACERATION OF LEFT LOWER EXTREMITY, INITIAL ENCOUNTER: Primary | ICD-10-CM

## 2021-11-06 PROCEDURE — 99283 EMERGENCY DEPT VISIT LOW MDM: CPT

## 2021-11-06 PROCEDURE — 12002 RPR S/N/AX/GEN/TRNK2.6-7.5CM: CPT

## 2021-11-06 RX ORDER — LIDOCAINE HYDROCHLORIDE 10 MG/ML
5 INJECTION, SOLUTION INFILTRATION; PERINEURAL ONCE
Status: DISCONTINUED | OUTPATIENT
Start: 2021-11-06 | End: 2021-11-06 | Stop reason: HOSPADM

## 2021-11-06 ASSESSMENT — ENCOUNTER SYMPTOMS
BACK PAIN: 0
COLOR CHANGE: 0
ABDOMINAL PAIN: 0
SHORTNESS OF BREATH: 0
EYE PAIN: 0

## 2021-11-06 ASSESSMENT — PAIN DESCRIPTION - ORIENTATION: ORIENTATION: LEFT

## 2021-11-06 ASSESSMENT — PAIN DESCRIPTION - PAIN TYPE: TYPE: ACUTE PAIN

## 2021-11-06 ASSESSMENT — PAIN DESCRIPTION - LOCATION: LOCATION: LEG

## 2021-11-06 ASSESSMENT — PAIN SCALES - GENERAL: PAINLEVEL_OUTOF10: 3

## 2021-11-06 NOTE — ED PROVIDER NOTES
myelopathy M51.26    Lumbago M54.50    Sprain of sacrum S33. 8XXA    Displacement of intervertebral disc, site unspecified, without myelopathy [id number]    Sprain of ligament of lumbosacral joint S33. 9XXA    Pleurisy R09.1    Acute bronchitis with asthma J20.9, J45.909    Excessive sweating R61    Acute pharyngitis J02.9    Generalized anxiety disorder F41.1    Postlaminectomy syndrome, lumbar region M96.1    Obesity (BMI 30-39. 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    Dizziness R42    Costochondritis M94.0    Small bowel obstruction (HCC) K56.609    Chronic obstructive pulmonary disease (HCC) J44.9    UIP (usual interstitial pneumonitis) (HCC) J84.112    Bronchiectasis without complication ([facility] [address] T18.9    Pneumonia due to COVID-19 virus U07.1, J12.82    Generalized weakness R53.1     SURGICAL HISTORY       Past Surgical History:   Procedure Laterality Date    ABDOMINAL EXPLORATION SURGERY      BACK SURGERY      X2    COLON SURGERY      COLONOSCOPY      COLONOSCOPY N/A 11/30/2020    COLONOSCOPY POLYPECTOMY HOT SNARE performed by Greta Borrero MD at [address] Right     HAND SURGERY Right 6/18/14    OVARY REMOVAL Bilateral     WRIST ARTHROPLASTY Left      CURRENT MEDICATIONS       Discharge Medication List as of 11/6/2021  8:30 PM      CONTINUE these medications which have NOT CHANGED    Details   traZODone (DESYREL) 100 MG tablet TAKE 1 TABLET BY MOUTH AT NIGHT, Disp-30 tablet, R-3Normal      FLUoxetine (PROZAC) 10 MG capsule Take 1 capsule by mouth twice daily, Disp-60 capsule, R-0Normal      nystatin (MYCOSTATIN) 480269 UNIT/GM ointment Apply topically 2 times daily. , Disp-30 g, R-1, Normal      zinc oxide (DESITIN) 13 % CREA Apply topically 2 times daily as needed for Rash or Irritation, Disp-113 g, R-0Normal      meclizine (ANTIVERT) 25 MG tablet Take 1 tablet by mouth twice daily as needed, Disp-60 tablet, R-0Normal guaiFENesin (MUCINEX) 600 MG extended release tablet Take 1 tablet by mouth 2 times daily, Disp-60 tablet, R-0Normal      pregabalin (LYRICA) 150 MG capsule Take 150 mg by mouth 2 times daily. Historical Med      vitamin C (ASCORBIC ACID) 500 MG tablet Take 1,000 mg by mouth 2 times dailyHistorical Med      Calcium Carbonate-Vitamin D (OSCAL 500/200 D-3 PO) Take 500 mg by mouth 2 times daily Historical Med      aspirin (ASPIRIN CHILDRENS) 81 MG chewable tablet Take 1 tablet by mouth daily, Disp-30 tablet, R-0Print           ALLERGIES     is allergic to Jose Armando Schein tartrate], ativan [lorazepam], darvon [propoxyphene], restoril [temazepam], and topamax [topiramate]. FAMILY HISTORY     She indicated that the status of her father is unknown. SOCIAL HISTORY       Social History     Tobacco Use    Smoking status: Former Smoker     Packs/day: 0.25     Years: 15.00     Pack years: 3.75     Types: Cigarettes     Quit date: 2019     Years since quittin.1    Smokeless tobacco: Never Used    Tobacco comment: quit 37 years ago   Vaping Use    Vaping Use: Never used   Substance Use Topics    Alcohol use: Yes     Alcohol/week: 2.0 standard drinks     Types: 2 Cans of beer per week     Comment: OCCASSION    Drug use: No     PHYSICAL EXAM     INITIAL VITALS: /68   Pulse 79   Temp 98.9 °F (37.2 °C) (Oral)   Resp 20   Ht 5' 4\" (1.626 m)   Wt 193 lb (87.5 kg)   SpO2 95%   BMI 33.13 kg/m²    Physical Exam  Vitals and nursing note reviewed. Constitutional:       General: She is not in acute distress. Appearance: Normal appearance. She is not toxic-appearing. HENT:      Head: Normocephalic and atraumatic. Nose: Nose normal.      Mouth/Throat:      Mouth: Mucous membranes are moist.      Pharynx: Oropharynx is clear. Eyes:      Extraocular Movements: Extraocular movements intact.       Conjunctiva/sclera: Conjunctivae normal.   Cardiovascular:      Rate and Rhythm: Normal rate and regular rhythm. Pulses: Normal pulses. Dorsalis pedis pulses are 2+ on the right side and 2+ on the left side. Heart sounds: Normal heart sounds. Pulmonary:      Effort: Pulmonary effort is normal.      Breath sounds: Normal breath sounds. Abdominal:      General: Bowel sounds are normal. There is no distension. Palpations: Abdomen is soft. Tenderness: There is no abdominal tenderness. Musculoskeletal:         General: Normal range of motion. Cervical back: Normal range of motion. Left lower leg: Laceration present. Legs:    Skin:     General: Skin is warm and dry. Capillary Refill: Capillary refill takes less than 2 seconds. Neurological:      General: No focal deficit present. Mental Status: She is alert. Psychiatric:         Mood and Affect: Mood normal.         MEDICAL DECISION MAKIN-year-old female presents for complaint of left leg laceration. On initial exam patient in no acute distress vitals are stable, is noted to have a 3 cm laceration to the left lower extremity, no active bleeding, no fascial violation, no exposed muscle tissue, will perform washout and laceration repair    Laceration repaired, see procedure note    Patient did not want tetanus updated    Discussed suture care with the patient, discussed need for suture removal in 7 to 10 days, discussed need for follow-up with PCP and return precautions, patient voiced understanding and is comfortable with the plan and discharge home    Patient/Guardian was informed of their diagnosis and told to follow up with PCP  in 1-3 days. Patient demonstrates understanding and agreement with the plan. They were given the opportunity to ask questions and those questions were answered to the best of our ability with the available information. Patient/Guardian told to return to the ED for any new, worsening, changing or persistent symptoms.          This dictation was prepared using Dragon Medical voice recognition software. CRITICAL CARE:       PROCEDURES:    Lac Repair    Date/Time: 11/6/2021 8:28 PM  Performed by: Chadd Calvo DO  Authorized by: Chadd Calvo DO     Consent:     Consent obtained:  Verbal    Consent given by:  Patient    Risks discussed:  Infection, need for additional repair, nerve damage, pain, poor cosmetic result, poor wound healing, retained foreign body, tendon damage and vascular damage  Anesthesia (see MAR for exact dosages): Anesthesia method:  Local infiltration    Local anesthetic:  Lidocaine 1% w/o epi  Laceration details:     Location:  Leg    Leg location:  L lower leg    Length (cm):  3  Repair type:     Repair type:  Simple  Pre-procedure details:     Preparation:  Patient was prepped and draped in usual sterile fashion  Exploration:     Hemostasis achieved with:  Direct pressure    Wound exploration: wound explored through full range of motion and entire depth of wound probed and visualized      Wound extent: no fascia violation noted, no foreign bodies/material noted, no muscle damage noted, no nerve damage noted, no tendon damage noted and no vascular damage noted      Contaminated: no    Treatment:     Area cleansed with:  Saline    Amount of cleaning:  Standard    Irrigation solution:  Sterile saline    Irrigation method:  Syringe    Visualized foreign bodies/material removed: no    Skin repair:     Repair method:  Sutures    Suture size:  4-0    Suture material:  Nylon    Suture technique:  Simple interrupted    Number of sutures:  7  Approximation:     Approximation:  Close  Post-procedure details:     Dressing:  Antibiotic ointment and sterile dressing    Patient tolerance of procedure:   Tolerated well, no immediate complications        DIAGNOSTIC RESULTS   EKG:All EKG's are interpreted by the Emergency Department Physician who either signs or Co-signs this chart in the absence of a cardiologist.        RADIOLOGY:All plain film, CT, MRI, and formal ultrasound images (except ED bedside ultrasound) are read by the radiologist, see reports below, unless otherwisenoted in MDM or here. No orders to display     LABS: All lab results were reviewed by myself, and all abnormals are listed below. Labs Reviewed - No data to display    EMERGENCY DEPARTMENTCOURSE:         Vitals:    Vitals:    11/06/21 1923 11/06/21 2050   BP:  130/68   Pulse: 79    Resp: 20    Temp: 98.9 °F (37.2 °C)    TempSrc: Oral    SpO2: 95%    Weight: 193 lb (87.5 kg)    Height: 5' 4\" (1.626 m)        The patient was given the following medications while in the emergency department:  Orders Placed This Encounter   Medications    DISCONTD: lidocaine 1 % injection 5 mL     CONSULTS:  None    FINAL IMPRESSION      1. Laceration of left lower extremity, initial encounter          DISPOSITION/PLAN   DISPOSITION Decision To Discharge 11/06/2021 08:29:40 PM      PATIENT REFERRED TO:  Zahra Fitzpatrick MD  [address] [place] [address]    Schedule an appointment as soon as possible for a visit   For suture removal, For wound re-check    [facility] ED  [facility] [address]  [phone]    As needed, If symptoms worsen, For suture removal, For wound re-check    DISCHARGE MEDICATIONS:  Discharge Medication List as of 11/6/2021  8:30 PM        The care is provided during an unprecedented national emergency due to the novel coronavirus, COVID 19.   Rusty Zhang DO  Attending Emergency Physician                  Rusty Zhang DO  11/07/21 7655

## 2021-11-09 ENCOUNTER — TELEPHONE (OUTPATIENT)
Dept: FAMILY MEDICINE CLINIC | Age: 66
End: 2021-11-09

## 2021-11-09 NOTE — TELEPHONE ENCOUNTER
Pt had a laceration on her left leg that got stitches on 11/06/21 and requests an appt to get stitches removed in 7-10 days but PCP is booked. When can pt be scheduled?

## 2021-11-15 RX ORDER — MECLIZINE HYDROCHLORIDE 25 MG/1
TABLET ORAL
Qty: 60 TABLET | Refills: 0 | Status: SHIPPED | OUTPATIENT
Start: 2021-11-15 | End: 2022-01-26

## 2021-11-15 NOTE — TELEPHONE ENCOUNTER
Louie Islas is calling to request a refill on the following medication(s):    Last Visit Date (If Applicable):  1/17/1520    Next Visit Date:    11/18/2021    Medication Request:  Requested Prescriptions     Pending Prescriptions Disp Refills    meclizine (ANTIVERT) 25 MG tablet [Pharmacy Med Name: Meclizine HCl 25 MG Oral Tablet] 60 tablet 0     Sig: Take 1 tablet by mouth twice daily as needed

## 2021-11-18 ENCOUNTER — OFFICE VISIT (OUTPATIENT)
Dept: FAMILY MEDICINE CLINIC | Age: 66
End: 2021-11-18
Payer: MEDICARE

## 2021-11-18 VITALS
OXYGEN SATURATION: 91 % | DIASTOLIC BLOOD PRESSURE: 74 MMHG | HEART RATE: 71 BPM | SYSTOLIC BLOOD PRESSURE: 129 MMHG | WEIGHT: 208 LBS | TEMPERATURE: 97 F | BODY MASS INDEX: 35.7 KG/M2

## 2021-11-18 DIAGNOSIS — S81.812D LACERATION OF LEFT LOWER LEG, SUBSEQUENT ENCOUNTER: Primary | ICD-10-CM

## 2021-11-18 DIAGNOSIS — Z23 NEED FOR INFLUENZA VACCINATION: ICD-10-CM

## 2021-11-18 PROCEDURE — 4040F PNEUMOC VAC/ADMIN/RCVD: CPT | Performed by: FAMILY MEDICINE

## 2021-11-18 PROCEDURE — G8400 PT W/DXA NO RESULTS DOC: HCPCS | Performed by: FAMILY MEDICINE

## 2021-11-18 PROCEDURE — G8417 CALC BMI ABV UP PARAM F/U: HCPCS | Performed by: FAMILY MEDICINE

## 2021-11-18 PROCEDURE — 90694 VACC AIIV4 NO PRSRV 0.5ML IM: CPT | Performed by: FAMILY MEDICINE

## 2021-11-18 PROCEDURE — G0008 ADMIN INFLUENZA VIRUS VAC: HCPCS | Performed by: FAMILY MEDICINE

## 2021-11-18 PROCEDURE — 1123F ACP DISCUSS/DSCN MKR DOCD: CPT | Performed by: FAMILY MEDICINE

## 2021-11-18 PROCEDURE — 1036F TOBACCO NON-USER: CPT | Performed by: FAMILY MEDICINE

## 2021-11-18 PROCEDURE — G8484 FLU IMMUNIZE NO ADMIN: HCPCS | Performed by: FAMILY MEDICINE

## 2021-11-18 PROCEDURE — G8427 DOCREV CUR MEDS BY ELIG CLIN: HCPCS | Performed by: FAMILY MEDICINE

## 2021-11-18 PROCEDURE — 3017F COLORECTAL CA SCREEN DOC REV: CPT | Performed by: FAMILY MEDICINE

## 2021-11-18 PROCEDURE — 1090F PRES/ABSN URINE INCON ASSESS: CPT | Performed by: FAMILY MEDICINE

## 2021-11-18 PROCEDURE — 99213 OFFICE O/P EST LOW 20 MIN: CPT | Performed by: FAMILY MEDICINE

## 2021-11-18 SDOH — ECONOMIC STABILITY: FOOD INSECURITY: WITHIN THE PAST 12 MONTHS, YOU WORRIED THAT YOUR FOOD WOULD RUN OUT BEFORE YOU GOT MONEY TO BUY MORE.: NEVER TRUE

## 2021-11-18 SDOH — ECONOMIC STABILITY: FOOD INSECURITY: WITHIN THE PAST 12 MONTHS, THE FOOD YOU BOUGHT JUST DIDN'T LAST AND YOU DIDN'T HAVE MONEY TO GET MORE.: NEVER TRUE

## 2021-11-18 ASSESSMENT — PATIENT HEALTH QUESTIONNAIRE - PHQ9
SUM OF ALL RESPONSES TO PHQ QUESTIONS 1-9: 0
2. FEELING DOWN, DEPRESSED OR HOPELESS: 0
SUM OF ALL RESPONSES TO PHQ9 QUESTIONS 1 & 2: 0
SUM OF ALL RESPONSES TO PHQ QUESTIONS 1-9: 0
1. LITTLE INTEREST OR PLEASURE IN DOING THINGS: 0
SUM OF ALL RESPONSES TO PHQ QUESTIONS 1-9: 0

## 2021-11-18 ASSESSMENT — SOCIAL DETERMINANTS OF HEALTH (SDOH): HOW HARD IS IT FOR YOU TO PAY FOR THE VERY BASICS LIKE FOOD, HOUSING, MEDICAL CARE, AND HEATING?: NOT HARD AT ALL

## 2021-11-18 NOTE — PROGRESS NOTES
General FM note    Angelica Mitchell is a 77 y.o. female who presents today for follow up on her  medical conditions as noted below. Angelica Mitchell is c/o of   Chief Complaint   Patient presents with    Suture / Staple Removal       Patient Active Problem List:     Osteoarthritis of hand     Sprain of lumbar region     Displacement of lumbar intervertebral disc without myelopathy     Lumbago     Sprain of sacrum     Displacement of intervertebral disc, site unspecified, without myelopathy     Sprain of ligament of lumbosacral joint     Pleurisy     Acute bronchitis with asthma     Excessive sweating     Acute pharyngitis     Generalized anxiety disorder     Postlaminectomy syndrome, lumbar region     Obesity (BMI 30-39. 9)     Meniere's disease of left ear     Dizziness     Costochondritis     Small bowel obstruction (HCC)     Chronic obstructive pulmonary disease (HCC)     UIP (usual interstitial pneumonitis) (HCC)     Bronchiectasis without complication (HCC)     Pneumonia due to COVID-19 virus     Generalized weakness     Past Medical History:   Diagnosis Date    Arthritis     Depression     Displacement of intervertebral disc, site unspecified, without myelopathy     [facility]    Displacement of lumbar intervertebral disc without myelopathy     bwc    Hyperlipidemia     Lumbago     [facility]    Sprain of lumbar region     bwc    Sprain of lumbosacral (joint) (ligament)     [facility]    Sprain of sacrum     [facility]      Past Surgical History:   Procedure Laterality Date    ABDOMINAL EXPLORATION SURGERY      BACK SURGERY      X2    COLON SURGERY      COLONOSCOPY      COLONOSCOPY N/A 11/30/2020    COLONOSCOPY POLYPECTOMY HOT SNARE performed by Cristhian Mesa [facility] at [facility] Right     HAND SURGERY Right 6/18/14    OVARY REMOVAL Bilateral     WRIST ARTHROPLASTY Left      Family History   Problem Relation Age of Onset    Heart Disease Father     High Blood Pressure Father      Current Outpatient Medications   Medication Sig Dispense Refill    meclizine (ANTIVERT) 25 MG tablet Take 1 tablet by mouth twice daily as needed 60 tablet 0    FLUoxetine (PROZAC) 10 MG capsule Take 1 capsule by mouth twice daily 60 capsule 3    traZODone (DESYREL) 100 MG tablet TAKE 1 TABLET BY MOUTH AT NIGHT 30 tablet 3    nystatin (MYCOSTATIN) 005384 UNIT/GM ointment Apply topically 2 times daily. 30 g 1    zinc oxide (DESITIN) 13 % CREA Apply topically 2 times daily as needed for Rash or Irritation 113 g 0    guaiFENesin (MUCINEX) 600 MG extended release tablet Take 1 tablet by mouth 2 times daily 60 tablet 0    pregabalin (LYRICA) 150 MG capsule Take 150 mg by mouth 2 times daily.  vitamin C (ASCORBIC ACID) 500 MG tablet Take 1,000 mg by mouth 2 times daily      Calcium Carbonate-Vitamin D (OSCAL 500/200 D-3 PO) Take 500 mg by mouth 2 times daily       aspirin (ASPIRIN CHILDRENS) 81 MG chewable tablet Take 1 tablet by mouth daily (Patient taking differently: Take 81 mg by mouth 2 times daily ) 30 tablet 0     No current facility-administered medications for this visit. ALLERGIES:    Allergies   Allergen Reactions    Ambien [Zolpidem Tartrate] Shortness Of Breath    Ativan [Lorazepam] Shortness Of Breath    Darvon [Propoxyphene] Other (See Comments)     Mental changes    Restoril [Temazepam]     Topamax [Topiramate]        Social History     Tobacco Use    Smoking status: Former Smoker     Packs/day: 0.25     Years: 15.00     Pack years: 3.75     Types: Cigarettes     Quit date: 2019     Years since quittin.2    Smokeless tobacco: Never Used    Tobacco comment: quit 37 years ago   Substance Use Topics    Alcohol use: Yes     Alcohol/week: 2.0 standard drinks     Types: 2 Cans of beer per week     Comment: OCCASSION      Body mass index is 35.7 kg/m².   /74   Pulse 71   Temp 97 °F (36.1 °C)   Wt 208 lb (94.3 kg)   SpO2 91%   BMI 35.70 kg/m² Subjective:      HPI    77 y.o. female coming today to remove sutures at her left lower extremity. The patient states that she was working on boxes and she did get cut at her left leg. This happened on 10/16/2021. The patient also tells me that she is probably going to move to [place]. She has a lot of family there. Review of Systems   Constitutional: Negative for fever and unexpected weight change. Pertinent items are noted in HPI. Objective:   Physical Exam  Constitutional: VS (see above). General appearance: normal development, habitus and attention, no deformities. No distress. Eyes: normal conjunctiva and lids. CAV: RRR, no RMG. No edema lower extremities. Pulmo: CTA bilateral, no CWR. Skin: no rashes, lesions or ulcers. Physical Exam  Musculoskeletal:        Legs:       Comments: 3 cm well-healed wound. No redness drainage. Musculoskeletal: normal gait. Nails: no clubbing or cyanosis. Psychiatric: alert and oriented to place, time and person. Normal mood and affect. Assessment:       Diagnosis Orders   1. Laceration of left lower leg, subsequent encounter   - [place] REMOVAL OF SUTURES   2. Need for influenza vaccination  INFLUENZA, QUADV, ADJUVANTED, 65 YRS =, IM, PF, PREFILL SYR, 0.5ML (FLUAD)       Plan:   Internal 7 sutures were removed. Patient tolerated procedure well. Return in about 6 months (around 5/18/2022), or if symptoms worsen or fail to improve. Orders Placed This Encounter   Procedures    INFLUENZA, QUADV, ADJUVANTED, 65 YRS =, IM, PF, PREFILL SYR, 0.5ML (FLUAD)     - [place] REMOVAL OF SUTURES     No orders of the defined types were placed in this encounter. Call or return to clinic prn if these symptoms worsen or fail to improve as anticipated. I have reviewed the instructions with the patient, answering all questions to patient's satisfaction.       Dali received counseling on the following healthy behaviors: nutrition, exercise, and medication adherence  Reviewed prior labs and health maintenance. Continue current medications, diet and exercise. Discussed use, benefit, and side effects of prescribed medications. Barriers to medication compliance addressed. Patient given educational materials - see patient instructions. All patient questions answered. Patient voiced understanding.       Electronically signed by Macario Palacio MD on 11/22/2021 at 7:14 AM       (Please note that portions of this note were completed with a voice recognition program. Efforts were made to edit the dictations but occasionally words are mis-transcribed.)

## 2022-01-26 RX ORDER — MECLIZINE HYDROCHLORIDE 25 MG/1
TABLET ORAL
Qty: 60 TABLET | Refills: 0 | Status: SHIPPED | OUTPATIENT
Start: 2022-01-26 | End: 2022-04-13

## 2022-03-21 DIAGNOSIS — S33.8XXS SACRUM SPRAIN, SEQUELA: ICD-10-CM

## 2022-03-21 DIAGNOSIS — F41.1 GENERALIZED ANXIETY DISORDER: ICD-10-CM

## 2022-03-21 DIAGNOSIS — S39.012S STRAIN OF LUMBAR REGION, SEQUELA: ICD-10-CM

## 2022-03-21 DIAGNOSIS — M51.26 DISPLACEMENT OF INTERVERTEBRAL DISC OF LUMBAR REGION: ICD-10-CM

## 2022-03-21 DIAGNOSIS — M51.26 DISPLACEMENT OF LUMBAR INTERVERTEBRAL DISC WITHOUT MYELOPATHY: ICD-10-CM

## 2022-03-21 DIAGNOSIS — S33.9XXS SPRAIN OF LIGAMENT OF LUMBOSACRAL JOINT, SEQUELA: ICD-10-CM

## 2022-03-21 DIAGNOSIS — Z13.31 POSITIVE DEPRESSION SCREENING: ICD-10-CM

## 2022-03-21 RX ORDER — FLUOXETINE 10 MG/1
CAPSULE ORAL
Qty: 60 CAPSULE | Refills: 0 | Status: SHIPPED | OUTPATIENT
Start: 2022-03-21 | End: 2022-04-19

## 2022-03-21 RX ORDER — TRAZODONE HYDROCHLORIDE 100 MG/1
TABLET ORAL
Qty: 30 TABLET | Refills: 0 | Status: SHIPPED | OUTPATIENT
Start: 2022-03-21 | End: 2022-04-19

## 2022-03-21 NOTE — TELEPHONE ENCOUNTER
Ameena Lawrence is calling to request a refill on the following medication(s):    Last Visit Date (If Applicable):  85/52/9194    Next Visit Date:    Visit date not found    Medication Request:  Requested Prescriptions     Pending Prescriptions Disp Refills    traZODone (DESYREL) 100 MG tablet [Pharmacy Med Name: traZODone HCl 100 MG Oral Tablet] 30 tablet 0     Sig: TAKE 1 TABLET BY MOUTH AT NIGHT    FLUoxetine (PROZAC) 10 MG capsule [Pharmacy Med Name: FLUoxetine HCl 10 MG Oral Capsule] 60 capsule 0     Sig: Take 1 capsule by mouth twice daily

## 2022-04-13 RX ORDER — MECLIZINE HYDROCHLORIDE 25 MG/1
TABLET ORAL
Qty: 60 TABLET | Refills: 0 | Status: SHIPPED | OUTPATIENT
Start: 2022-04-13 | End: 2022-06-17 | Stop reason: SDUPTHER

## 2022-04-19 DIAGNOSIS — S39.012S STRAIN OF LUMBAR REGION, SEQUELA: ICD-10-CM

## 2022-04-19 DIAGNOSIS — F41.1 GENERALIZED ANXIETY DISORDER: ICD-10-CM

## 2022-04-19 DIAGNOSIS — S33.8XXS SACRUM SPRAIN, SEQUELA: ICD-10-CM

## 2022-04-19 DIAGNOSIS — Z13.31 POSITIVE DEPRESSION SCREENING: ICD-10-CM

## 2022-04-19 DIAGNOSIS — S33.9XXS SPRAIN OF LIGAMENT OF LUMBOSACRAL JOINT, SEQUELA: ICD-10-CM

## 2022-04-19 DIAGNOSIS — M51.26 DISPLACEMENT OF LUMBAR INTERVERTEBRAL DISC WITHOUT MYELOPATHY: ICD-10-CM

## 2022-04-19 DIAGNOSIS — M51.26 DISPLACEMENT OF INTERVERTEBRAL DISC OF LUMBAR REGION: ICD-10-CM

## 2022-04-19 RX ORDER — TRAZODONE HYDROCHLORIDE 100 MG/1
TABLET ORAL
Qty: 30 TABLET | Refills: 3 | Status: SHIPPED | OUTPATIENT
Start: 2022-04-19 | End: 2022-08-23

## 2022-04-19 RX ORDER — FLUOXETINE 10 MG/1
CAPSULE ORAL
Qty: 60 CAPSULE | Refills: 3 | Status: SHIPPED | OUTPATIENT
Start: 2022-04-19 | End: 2022-09-08

## 2022-06-17 RX ORDER — MECLIZINE HYDROCHLORIDE 25 MG/1
TABLET ORAL
Qty: 60 TABLET | Refills: 0 | Status: SHIPPED | OUTPATIENT
Start: 2022-06-17 | End: 2022-08-23

## 2022-06-17 NOTE — TELEPHONE ENCOUNTER
Sylvia Leblanc is calling to request a refill on the following medication(s):    Last Visit Date (If Applicable):  74/72/3620    Next Visit Date:    Visit date not found    Medication Request:  Requested Prescriptions     Pending Prescriptions Disp Refills    meclizine (ANTIVERT) 25 MG tablet [Pharmacy Med Name: Meclizine HCl 25 MG Oral Tablet] 60 tablet 0     Sig: Take 1 tablet by mouth twice daily as needed

## 2022-07-21 DIAGNOSIS — Z12.31 ENCOUNTER FOR SCREENING MAMMOGRAM FOR MALIGNANT NEOPLASM OF BREAST: Primary | ICD-10-CM

## 2022-07-29 ENCOUNTER — HOSPITAL ENCOUNTER (OUTPATIENT)
Dept: WOMENS IMAGING | Age: 67
Discharge: HOME | End: 2022-07-31
Payer: MEDICARE

## 2022-07-29 DIAGNOSIS — Z12.31 ENCOUNTER FOR SCREENING MAMMOGRAM FOR MALIGNANT NEOPLASM OF BREAST: ICD-10-CM

## 2022-07-29 PROCEDURE — 77063 BREAST TOMOSYNTHESIS BI: CPT

## 2022-08-23 DIAGNOSIS — S33.8XXS SACRUM SPRAIN, SEQUELA: ICD-10-CM

## 2022-08-23 DIAGNOSIS — M51.26 DISPLACEMENT OF INTERVERTEBRAL DISC OF LUMBAR REGION: ICD-10-CM

## 2022-08-23 DIAGNOSIS — S39.012S STRAIN OF LUMBAR REGION, SEQUELA: ICD-10-CM

## 2022-08-23 DIAGNOSIS — M51.26 DISPLACEMENT OF LUMBAR INTERVERTEBRAL DISC WITHOUT MYELOPATHY: ICD-10-CM

## 2022-08-23 DIAGNOSIS — S33.9XXS SPRAIN OF LIGAMENT OF LUMBOSACRAL JOINT, SEQUELA: ICD-10-CM

## 2022-08-23 RX ORDER — MECLIZINE HYDROCHLORIDE 25 MG/1
TABLET ORAL
Qty: 60 TABLET | Refills: 0 | Status: SHIPPED | OUTPATIENT
Start: 2022-08-23 | End: 2022-10-22

## 2022-08-23 RX ORDER — TRAZODONE HYDROCHLORIDE 100 MG/1
TABLET ORAL
Qty: 90 TABLET | Refills: 0 | Status: SHIPPED | OUTPATIENT
Start: 2022-08-23

## 2022-09-07 DIAGNOSIS — Z13.31 POSITIVE DEPRESSION SCREENING: ICD-10-CM

## 2022-09-07 DIAGNOSIS — F41.1 GENERALIZED ANXIETY DISORDER: ICD-10-CM

## 2022-09-08 RX ORDER — FLUOXETINE 10 MG/1
CAPSULE ORAL
Qty: 60 CAPSULE | Refills: 0 | Status: SHIPPED | OUTPATIENT
Start: 2022-09-08 | End: 2022-10-12

## 2022-09-19 ENCOUNTER — TELEPHONE (OUTPATIENT)
Dept: FAMILY MEDICINE CLINIC | Age: 67
End: 2022-09-19

## 2022-09-19 RX ORDER — NYSTATIN 100000 U/G
OINTMENT TOPICAL
Qty: 30 G | Refills: 1 | Status: SHIPPED | OUTPATIENT
Start: 2022-09-19

## 2022-09-19 NOTE — TELEPHONE ENCOUNTER
Patient is moving and will be in town until Friday 9/23 , and you have no openings. I understand she is requesting a diet pill for weight loss. So she is able to come in for nurse visit Thursday 9/22 to get vitals and accurate weight. She is scheduled with you for a VV on Friday 9/23 at 11:45. Does that work for you? Does patient have to be seen in office for diet pills?

## 2022-09-19 NOTE — TELEPHONE ENCOUNTER
Appointment was called in. Please make an appointment for the patient to be seen this or next week. Thank you.

## 2022-10-12 DIAGNOSIS — Z13.31 POSITIVE DEPRESSION SCREENING: ICD-10-CM

## 2022-10-12 DIAGNOSIS — F41.1 GENERALIZED ANXIETY DISORDER: ICD-10-CM

## 2022-10-12 RX ORDER — FLUOXETINE 10 MG/1
CAPSULE ORAL
Qty: 60 CAPSULE | Refills: 0 | Status: SHIPPED | OUTPATIENT
Start: 2022-10-12

## 2022-10-12 NOTE — TELEPHONE ENCOUNTER
Vamshi Gilbert is calling to request a refill on the following medication(s):    Medication Request:  Requested Prescriptions     Pending Prescriptions Disp Refills    FLUoxetine (PROZAC) 10 MG capsule [Pharmacy Med Name: FLUoxetine HCl 10 MG Oral Capsule] 60 capsule 0     Sig: Take 1 capsule by mouth twice daily       Last Visit Date (If Applicable):  29/10/0681    Next Visit Date:    Visit date not found

## 2022-10-21 NOTE — TELEPHONE ENCOUNTER
Cee Marrufo is calling to request a refill on the following medication(s):    Last Visit Date (If Applicable):  21/59/3662    Next Visit Date:    Visit date not found    Medication Request:  Requested Prescriptions     Pending Prescriptions Disp Refills    meclizine (ANTIVERT) 25 MG tablet [Pharmacy Med Name: Meclizine HCl 25 MG Oral Tablet] 60 tablet 0     Sig: Take 1 tablet by mouth twice daily as needed

## 2022-10-22 RX ORDER — MECLIZINE HYDROCHLORIDE 25 MG/1
TABLET ORAL
Qty: 60 TABLET | Refills: 0 | Status: SHIPPED | OUTPATIENT
Start: 2022-10-22

## 2022-11-16 ENCOUNTER — TELEPHONE (OUTPATIENT)
Dept: FAMILY MEDICINE CLINIC | Age: 67
End: 2022-11-16

## 2022-11-29 DIAGNOSIS — S33.8XXS SACRUM SPRAIN, SEQUELA: ICD-10-CM

## 2022-11-29 DIAGNOSIS — S39.012S STRAIN OF LUMBAR REGION, SEQUELA: ICD-10-CM

## 2022-11-29 DIAGNOSIS — F41.1 GENERALIZED ANXIETY DISORDER: ICD-10-CM

## 2022-11-29 DIAGNOSIS — M51.26 DISPLACEMENT OF INTERVERTEBRAL DISC OF LUMBAR REGION: ICD-10-CM

## 2022-11-29 DIAGNOSIS — Z13.31 POSITIVE DEPRESSION SCREENING: ICD-10-CM

## 2022-11-29 DIAGNOSIS — M51.26 DISPLACEMENT OF LUMBAR INTERVERTEBRAL DISC WITHOUT MYELOPATHY: ICD-10-CM

## 2022-11-29 DIAGNOSIS — S33.9XXS SPRAIN OF LIGAMENT OF LUMBOSACRAL JOINT, SEQUELA: ICD-10-CM

## 2022-11-29 RX ORDER — MECLIZINE HYDROCHLORIDE 25 MG/1
TABLET ORAL
Qty: 60 TABLET | Refills: 0 | Status: SHIPPED | OUTPATIENT
Start: 2022-11-29

## 2022-11-29 RX ORDER — FLUOXETINE 10 MG/1
CAPSULE ORAL
Qty: 60 CAPSULE | Refills: 0 | Status: SHIPPED | OUTPATIENT
Start: 2022-11-29

## 2022-11-29 RX ORDER — TRAZODONE HYDROCHLORIDE 100 MG/1
TABLET ORAL
Qty: 90 TABLET | Refills: 0 | Status: SHIPPED | OUTPATIENT
Start: 2022-11-29

## 2024-03-26 NOTE — TELEPHONE ENCOUNTER
Faxed to [phone]  Copy sent to abstract  Copy kept in clinic    LVM for pt to return call to schedule appt

## (undated) DEVICE — ENDO KIT W/SYRINGE: Brand: MEDLINE INDUSTRIES, INC.

## (undated) DEVICE — GLOVE ORANGE PI 7 1/2   MSG9075

## (undated) DEVICE — DEFENDO AIR WATER SUCTION AND BIOPSY VALVE KIT FOR  OLYMPUS: Brand: DEFENDO AIR/WATER/SUCTION AND BIOPSY VALVE

## (undated) DEVICE — SNARE ENDOSCP AD SM L240CM LOOP W1.3CM SHTH DIA2.4MM POLYP